# Patient Record
Sex: FEMALE | Race: WHITE | NOT HISPANIC OR LATINO | Employment: FULL TIME | URBAN - METROPOLITAN AREA
[De-identification: names, ages, dates, MRNs, and addresses within clinical notes are randomized per-mention and may not be internally consistent; named-entity substitution may affect disease eponyms.]

---

## 2023-04-07 DIAGNOSIS — F41.9 ANXIETY DISORDER, UNSPECIFIED: ICD-10-CM

## 2023-04-07 RX ORDER — PROPRANOLOL HYDROCHLORIDE 80 MG/1
CAPSULE, EXTENDED RELEASE ORAL
Qty: 90 CAPSULE | Refills: 0 | Status: SHIPPED | OUTPATIENT
Start: 2023-04-07 | End: 2023-08-18

## 2023-05-01 ENCOUNTER — LAB (OUTPATIENT)
Dept: LAB | Facility: LAB | Age: 58
End: 2023-05-01
Payer: COMMERCIAL

## 2023-05-01 ENCOUNTER — OFFICE VISIT (OUTPATIENT)
Dept: PRIMARY CARE | Facility: CLINIC | Age: 58
End: 2023-05-01
Payer: COMMERCIAL

## 2023-05-01 VITALS — DIASTOLIC BLOOD PRESSURE: 82 MMHG | SYSTOLIC BLOOD PRESSURE: 120 MMHG | BODY MASS INDEX: 52.73 KG/M2 | WEIGHT: 293 LBS

## 2023-05-01 DIAGNOSIS — Z00.00 ROUTINE GENERAL MEDICAL EXAMINATION AT A HEALTH CARE FACILITY: ICD-10-CM

## 2023-05-01 DIAGNOSIS — Z12.31 BREAST CANCER SCREENING BY MAMMOGRAM: Primary | ICD-10-CM

## 2023-05-01 LAB
ALANINE AMINOTRANSFERASE (SGPT) (U/L) IN SER/PLAS: 48 U/L (ref 7–45)
ALBUMIN (G/DL) IN SER/PLAS: 4.2 G/DL (ref 3.4–5)
ALKALINE PHOSPHATASE (U/L) IN SER/PLAS: 95 U/L (ref 33–136)
ANION GAP IN SER/PLAS: 13 MMOL/L (ref 10–20)
ASPARTATE AMINOTRANSFERASE (SGOT) (U/L) IN SER/PLAS: 30 U/L (ref 9–39)
BASOPHILS (10*3/UL) IN BLOOD BY AUTOMATED COUNT: 0.07 X10E9/L (ref 0–0.1)
BASOPHILS/100 LEUKOCYTES IN BLOOD BY AUTOMATED COUNT: 0.9 % (ref 0–2)
BILIRUBIN TOTAL (MG/DL) IN SER/PLAS: 0.4 MG/DL (ref 0–1.2)
CALCIUM (MG/DL) IN SER/PLAS: 9.7 MG/DL (ref 8.6–10.6)
CARBON DIOXIDE, TOTAL (MMOL/L) IN SER/PLAS: 29 MMOL/L (ref 21–32)
CHLORIDE (MMOL/L) IN SER/PLAS: 103 MMOL/L (ref 98–107)
CHOLESTEROL (MG/DL) IN SER/PLAS: 225 MG/DL (ref 0–199)
CHOLESTEROL IN HDL (MG/DL) IN SER/PLAS: 61.4 MG/DL
CHOLESTEROL/HDL RATIO: 3.7
CREATININE (MG/DL) IN SER/PLAS: 0.94 MG/DL (ref 0.5–1.05)
EOSINOPHILS (10*3/UL) IN BLOOD BY AUTOMATED COUNT: 0.3 X10E9/L (ref 0–0.7)
EOSINOPHILS/100 LEUKOCYTES IN BLOOD BY AUTOMATED COUNT: 3.7 % (ref 0–6)
ERYTHROCYTE DISTRIBUTION WIDTH (RATIO) BY AUTOMATED COUNT: 14.4 % (ref 11.5–14.5)
ERYTHROCYTE MEAN CORPUSCULAR HEMOGLOBIN CONCENTRATION (G/DL) BY AUTOMATED: 30.4 G/DL (ref 32–36)
ERYTHROCYTE MEAN CORPUSCULAR VOLUME (FL) BY AUTOMATED COUNT: 89 FL (ref 80–100)
ERYTHROCYTES (10*6/UL) IN BLOOD BY AUTOMATED COUNT: 4.68 X10E12/L (ref 4–5.2)
ESTIMATED AVERAGE GLUCOSE FOR HBA1C: 105 MG/DL
GFR FEMALE: 69 ML/MIN/1.73M2
GLUCOSE (MG/DL) IN SER/PLAS: 95 MG/DL (ref 74–99)
HEMATOCRIT (%) IN BLOOD BY AUTOMATED COUNT: 41.8 % (ref 36–46)
HEMOGLOBIN (G/DL) IN BLOOD: 12.7 G/DL (ref 12–16)
HEMOGLOBIN A1C/HEMOGLOBIN TOTAL IN BLOOD: 5.3 %
IMMATURE GRANULOCYTES/100 LEUKOCYTES IN BLOOD BY AUTOMATED COUNT: 0.4 % (ref 0–0.9)
LDL: 136 MG/DL (ref 0–99)
LEUKOCYTES (10*3/UL) IN BLOOD BY AUTOMATED COUNT: 8.1 X10E9/L (ref 4.4–11.3)
LYMPHOCYTES (10*3/UL) IN BLOOD BY AUTOMATED COUNT: 2.46 X10E9/L (ref 1.2–4.8)
LYMPHOCYTES/100 LEUKOCYTES IN BLOOD BY AUTOMATED COUNT: 30.2 % (ref 13–44)
MONOCYTES (10*3/UL) IN BLOOD BY AUTOMATED COUNT: 0.64 X10E9/L (ref 0.1–1)
MONOCYTES/100 LEUKOCYTES IN BLOOD BY AUTOMATED COUNT: 7.9 % (ref 2–10)
NEUTROPHILS (10*3/UL) IN BLOOD BY AUTOMATED COUNT: 4.64 X10E9/L (ref 1.2–7.7)
NEUTROPHILS/100 LEUKOCYTES IN BLOOD BY AUTOMATED COUNT: 56.9 % (ref 40–80)
NRBC (PER 100 WBCS) BY AUTOMATED COUNT: 0 /100 WBC (ref 0–0)
PLATELETS (10*3/UL) IN BLOOD AUTOMATED COUNT: 303 X10E9/L (ref 150–450)
POTASSIUM (MMOL/L) IN SER/PLAS: 4.7 MMOL/L (ref 3.5–5.3)
PROTEIN TOTAL: 6.8 G/DL (ref 6.4–8.2)
SODIUM (MMOL/L) IN SER/PLAS: 140 MMOL/L (ref 136–145)
THYROTROPIN (MIU/L) IN SER/PLAS BY DETECTION LIMIT <= 0.05 MIU/L: 1.91 MIU/L (ref 0.44–3.98)
TRIGLYCERIDE (MG/DL) IN SER/PLAS: 138 MG/DL (ref 0–149)
UREA NITROGEN (MG/DL) IN SER/PLAS: 21 MG/DL (ref 6–23)
VLDL: 28 MG/DL (ref 0–40)

## 2023-05-01 PROCEDURE — 84443 ASSAY THYROID STIM HORMONE: CPT

## 2023-05-01 PROCEDURE — 99214 OFFICE O/P EST MOD 30 MIN: CPT | Performed by: INTERNAL MEDICINE

## 2023-05-01 PROCEDURE — 36415 COLL VENOUS BLD VENIPUNCTURE: CPT

## 2023-05-01 PROCEDURE — 85025 COMPLETE CBC W/AUTO DIFF WBC: CPT

## 2023-05-01 PROCEDURE — 80053 COMPREHEN METABOLIC PANEL: CPT

## 2023-05-01 PROCEDURE — 83036 HEMOGLOBIN GLYCOSYLATED A1C: CPT

## 2023-05-01 PROCEDURE — 80061 LIPID PANEL: CPT

## 2023-05-01 NOTE — PROGRESS NOTES
Patient is a 58 year old female here today to discuss weight loss.     HPI: Patient is here to discuss weight loss. She has a history of RYGB in 2017 and says she only lost 50 lbs from the procedure. She has also trialed phentermine in the past with no success. She says that she typically eats a breakfast sandwich or yogurt for breakfast, depending on what time she eats breakfast she doesn't always eat lunch. For dinner she eats a protein, vegetable, and potato. She says that exercise has been hard. She has been swimming and water walking 1-2 times a week.     Obesity   - Trialed phentermine- no weight loss   - s/p RYGB 7/2017   - Weight today was 312 lbs.      Venous stasis ulcers/insufficiency   - following with vascular      HTN  - on propranolol, well controlled     ZEENAT  - on CPAP, compliant     FM, RLS, OA, sclerderma  Dry skin/hair loss  - follows with rheum  -Duloxetine 60mg  - on gabapentin 300mg TID  - underlying dry skin/hair loss likely 2/2 to underlying autoimmune disease     GERD  - Controlled with omeprazole         Interstitial cystitis  - follows with urogyn  - on hydroxyzine 25mg, phenazopyridine 200mg TID PRN     Hypercholesterolemia  - CVD risk 4.5%: no indication for statin therapy           HM  - due for mammogram  - PAP: s/p GABRIEL, cervix removed  - Colonoscopy: last 2020, needs in 2025  - COVID: 2 doses  - Tdap: 2021    Plan: Discussed potentially starting glp-1. Patient does not want to have any more surgery for her weight loss.

## 2023-05-01 NOTE — PATIENT INSTRUCTIONS
Sun,     The weight loss meds Ozempic (semaglutide) and Mounjaro (tirzapetide) are your best options for weight loss. [facility] is one in [place] that some of my patients use --- [email or web address]   . [employer] is another. Www.[employer] . Ozempic comes in 4 doses; Mounjaro comes in 6. You start at the lowest dose for 4 weeks then go up on the dose every 4 weeks. For some people, they don't notice the full appetite and craving suppression for a few months. Common side effects like worseninga brooks reflux, or other GI side effects can occur but you get used to them generally.   Go to downstairs to [address] for labs.   Call [phone] to schedule mammogram.   Consider the shingles shot. The new shot shingrix does reduce your chance by 90%; it's 2 shots 2-6 months apart then you're done. We can always do it here if you ever decide!     CL

## 2023-05-04 NOTE — PROGRESS NOTES
Sun Zimmerman is a 59 yo F presenting in [facility].     1. Obesity s.p RYGB 2017   -s/p phentermine 2022   -resistant to revision consideration, regrets initial surgery     2. Chronic venous insufficiency   -s/p vasc referral in the past     3. HTN   -propranolol monotherapy     4. ZEENAT   -CPAP     5. FM, RLS, OA, scleroderma   -olimpia 300 TID   -FU rheum   -h/o cymbalta, plaquenil, pramiprexole and meloxicam in the past     6. GERD   -omeprazole     7. Interstitial cystitis   -hdyroxyzine   -phenazopyridine       8. DLD low ASCVD     #HM   -tdap 2021   [ ]shingrix due   -cscope 2020 - due 2025   -no pap 2.2 ming   [ ]mammo due last 2.2020   [ ]screen lab nella e  -dexa 9.22 with rheum       Gen Aox3 NAD well appearing   HEENT mmm pharynx clear no cervical LAD   Eyes sclerae clear   CV rrr nl s1/2 no m/r/g  Pulm CTAB no adventitious sounds   Ext warm dry no edema 2+ DP pulse

## 2023-08-01 LAB
C REACTIVE PROTEIN (MG/L) IN SER/PLAS: 0.26 MG/DL
CALCIDIOL (25 OH VITAMIN D3) (NG/ML) IN SER/PLAS: 32 NG/ML
COBALAMIN (VITAMIN B12) (PG/ML) IN SER/PLAS: 303 PG/ML (ref 211–911)
CREATINE KINASE (U/L) IN SER/PLAS: 29 U/L (ref 0–215)
FOLATE (NG/ML) IN SER/PLAS: 10.4 NG/ML
SEDIMENTATION RATE, ERYTHROCYTE: 21 MM/H (ref 0–30)

## 2023-08-02 LAB
ANA PATTERN: ABNORMAL
ANA TITER: ABNORMAL
ANTI-CENTROMERE: <0.2 AI
ANTI-CHROMATIN: <0.2 AI
ANTI-DNA (DS): <1 IU/ML
ANTI-JO-1 IGG: <0.2 AI
ANTI-NUCLEAR ANTIBODY (ANA): POSITIVE
ANTI-RIBOSOMAL P: <0.2 AI
ANTI-RNP: <0.2 AI
ANTI-SCL-70: 0.9 AI
ANTI-SM/RNP: <0.2 AI
ANTI-SM: <0.2 AI
ANTI-SSA: <0.2 AI
ANTI-SSB: <0.2 AI

## 2023-08-05 LAB — VITAMIN B6: 36.5 NMOL/L (ref 20–125)

## 2023-08-18 DIAGNOSIS — F41.9 ANXIETY DISORDER, UNSPECIFIED: ICD-10-CM

## 2023-08-18 RX ORDER — PROPRANOLOL HYDROCHLORIDE 80 MG/1
CAPSULE, EXTENDED RELEASE ORAL
Qty: 90 CAPSULE | Refills: 0 | Status: SHIPPED | OUTPATIENT
Start: 2023-08-18 | End: 2023-11-06

## 2023-11-03 DIAGNOSIS — F41.9 ANXIETY DISORDER, UNSPECIFIED: ICD-10-CM

## 2023-11-06 RX ORDER — PROPRANOLOL HYDROCHLORIDE 80 MG/1
CAPSULE, EXTENDED RELEASE ORAL
Qty: 90 CAPSULE | Refills: 0 | Status: SHIPPED | OUTPATIENT
Start: 2023-11-06 | End: 2024-02-01

## 2023-12-16 DIAGNOSIS — M17.12 UNILATERAL PRIMARY OSTEOARTHRITIS, LEFT KNEE: ICD-10-CM

## 2023-12-18 RX ORDER — NAPROXEN 500 MG/1
500 TABLET ORAL
Qty: 60 TABLET | Refills: 2 | Status: SHIPPED | OUTPATIENT
Start: 2023-12-18 | End: 2024-03-25

## 2024-01-08 DIAGNOSIS — F32.9 MAJOR DEPRESSIVE DISORDER, SINGLE EPISODE, UNSPECIFIED: ICD-10-CM

## 2024-01-08 RX ORDER — DULOXETIN HYDROCHLORIDE 60 MG/1
60 CAPSULE, DELAYED RELEASE ORAL DAILY
Qty: 90 CAPSULE | Refills: 0 | Status: SHIPPED | OUTPATIENT
Start: 2024-01-08 | End: 2024-05-15 | Stop reason: SDUPTHER

## 2024-02-01 DIAGNOSIS — F41.9 ANXIETY DISORDER, UNSPECIFIED: ICD-10-CM

## 2024-02-01 RX ORDER — PROPRANOLOL HYDROCHLORIDE 80 MG/1
80 CAPSULE, EXTENDED RELEASE ORAL DAILY
Qty: 90 CAPSULE | Refills: 0 | Status: SHIPPED | OUTPATIENT
Start: 2024-02-01 | End: 2024-05-14 | Stop reason: SDUPTHER

## 2024-03-23 DIAGNOSIS — M17.12 UNILATERAL PRIMARY OSTEOARTHRITIS, LEFT KNEE: ICD-10-CM

## 2024-03-25 RX ORDER — NAPROXEN 500 MG/1
500 TABLET ORAL
Qty: 60 TABLET | Refills: 2 | Status: SHIPPED | OUTPATIENT
Start: 2024-03-25

## 2024-04-11 DIAGNOSIS — K21.9 GASTRO-ESOPHAGEAL REFLUX DISEASE WITHOUT ESOPHAGITIS: ICD-10-CM

## 2024-04-12 RX ORDER — OMEPRAZOLE 40 MG/1
CAPSULE, DELAYED RELEASE ORAL
Qty: 90 CAPSULE | Refills: 3 | Status: SHIPPED | OUTPATIENT
Start: 2024-04-12

## 2024-05-02 DIAGNOSIS — M25.362 PATELLAR INSTABILITY OF LEFT KNEE: ICD-10-CM

## 2024-05-02 DIAGNOSIS — M25.562 LEFT KNEE PAIN, UNSPECIFIED CHRONICITY: ICD-10-CM

## 2024-05-03 ENCOUNTER — OFFICE VISIT (OUTPATIENT)
Dept: PRIMARY CARE | Facility: CLINIC | Age: 59
End: 2024-05-03
Payer: COMMERCIAL

## 2024-05-03 VITALS — BODY MASS INDEX: 47.59 KG/M2 | WEIGHT: 286 LBS | SYSTOLIC BLOOD PRESSURE: 127 MMHG | DIASTOLIC BLOOD PRESSURE: 82 MMHG

## 2024-05-03 DIAGNOSIS — Z12.31 BREAST CANCER SCREENING BY MAMMOGRAM: ICD-10-CM

## 2024-05-03 DIAGNOSIS — M62.81 MUSCLE WEAKNESS OF RIGHT UPPER EXTREMITY: Primary | ICD-10-CM

## 2024-05-03 DIAGNOSIS — Z00.00 ROUTINE GENERAL MEDICAL EXAMINATION AT A HEALTH CARE FACILITY: ICD-10-CM

## 2024-05-03 DIAGNOSIS — M17.9 OSTEOARTHRITIS OF KNEE, UNSPECIFIED LATERALITY, UNSPECIFIED OSTEOARTHRITIS TYPE: ICD-10-CM

## 2024-05-03 PROCEDURE — 99396 PREV VISIT EST AGE 40-64: CPT | Performed by: INTERNAL MEDICINE

## 2024-05-14 DIAGNOSIS — F41.9 ANXIETY DISORDER, UNSPECIFIED: ICD-10-CM

## 2024-05-14 RX ORDER — PROPRANOLOL HYDROCHLORIDE 80 MG/1
80 CAPSULE, EXTENDED RELEASE ORAL DAILY
Qty: 90 CAPSULE | Refills: 0 | Status: SHIPPED | OUTPATIENT
Start: 2024-05-14

## 2024-05-15 ENCOUNTER — OFFICE VISIT (OUTPATIENT)
Dept: ORTHOPEDIC SURGERY | Facility: CLINIC | Age: 59
End: 2024-05-15
Payer: COMMERCIAL

## 2024-05-15 ENCOUNTER — HOSPITAL ENCOUNTER (OUTPATIENT)
Dept: RADIOLOGY | Facility: HOSPITAL | Age: 59
Discharge: HOME | End: 2024-05-15
Payer: COMMERCIAL

## 2024-05-15 VITALS — HEIGHT: 65 IN | WEIGHT: 286 LBS | BODY MASS INDEX: 47.65 KG/M2

## 2024-05-15 DIAGNOSIS — M17.12 ARTHRITIS OF LEFT KNEE: Primary | ICD-10-CM

## 2024-05-15 DIAGNOSIS — M25.562 LEFT KNEE PAIN, UNSPECIFIED CHRONICITY: ICD-10-CM

## 2024-05-15 DIAGNOSIS — F32.9 MAJOR DEPRESSIVE DISORDER, SINGLE EPISODE, UNSPECIFIED: ICD-10-CM

## 2024-05-15 DIAGNOSIS — M25.362 PATELLAR INSTABILITY OF LEFT KNEE: ICD-10-CM

## 2024-05-15 PROCEDURE — 20610 DRAIN/INJ JOINT/BURSA W/O US: CPT | Performed by: PHYSICIAN ASSISTANT

## 2024-05-15 PROCEDURE — 73564 X-RAY EXAM KNEE 4 OR MORE: CPT | Mod: LEFT SIDE | Performed by: RADIOLOGY

## 2024-05-15 PROCEDURE — 1036F TOBACCO NON-USER: CPT | Performed by: PHYSICIAN ASSISTANT

## 2024-05-15 PROCEDURE — 99213 OFFICE O/P EST LOW 20 MIN: CPT | Performed by: PHYSICIAN ASSISTANT

## 2024-05-15 PROCEDURE — 73564 X-RAY EXAM KNEE 4 OR MORE: CPT | Mod: LT

## 2024-05-15 RX ORDER — LIDOCAINE HYDROCHLORIDE 5 MG/ML
4 INJECTION, SOLUTION INFILTRATION; PERINEURAL
Status: COMPLETED | OUTPATIENT
Start: 2024-05-15 | End: 2024-05-15

## 2024-05-15 RX ORDER — TRIAMCINOLONE ACETONIDE 40 MG/ML
40 INJECTION, SUSPENSION INTRA-ARTICULAR; INTRAMUSCULAR
Status: COMPLETED | OUTPATIENT
Start: 2024-05-15 | End: 2024-05-15

## 2024-05-15 RX ADMIN — TRIAMCINOLONE ACETONIDE 40 MG: 40 INJECTION, SUSPENSION INTRA-ARTICULAR; INTRAMUSCULAR at 10:57

## 2024-05-15 RX ADMIN — LIDOCAINE HYDROCHLORIDE 4 ML: 5 INJECTION, SOLUTION INFILTRATION; PERINEURAL at 10:57

## 2024-05-15 ASSESSMENT — PAIN SCALES - GENERAL: PAINLEVEL_OUTOF10: 10 - WORST POSSIBLE PAIN

## 2024-05-15 ASSESSMENT — PAIN - FUNCTIONAL ASSESSMENT: PAIN_FUNCTIONAL_ASSESSMENT: 0-10

## 2024-05-15 NOTE — PROGRESS NOTES
History of Present Illness  59 y.o. female presenting for exacerbation of the left knee pain.  I previously seen the patient for her left knee just over a year ago, at that point time she received steroid injection which she states significantly improved her pain for a few months.  Patient states however recently over the past 6 weeks the left knee pain has significantly worsened limiting her mobility.  She denies any recent trauma or injury.  States that she has been taking anti-inflammatories with no significant improvement.    Past Medical History:   Diagnosis Date    Encounter for other preprocedural examination 05/25/2017    Pre-op testing    Encounter for preprocedural respiratory examination 05/24/2017    Preop pulmonary/respiratory exam    Other conditions influencing health status 10/14/2016    Difficulty Breathing (Dyspnea)       Medication Documentation Review Audit    **Prior to Admission medications have not yet been reviewed**         Not on File    Social History     Socioeconomic History    Marital status:      Spouse name: Not on file    Number of children: Not on file    Years of education: Not on file    Highest education level: Not on file   Occupational History    Not on file   Tobacco Use    Smoking status: Not on file    Smokeless tobacco: Not on file   Substance and Sexual Activity    Alcohol use: Not on file    Drug use: Not on file    Sexual activity: Not on file   Other Topics Concern    Not on file   Social History Narrative    Not on file     Social Determinants of Health     Financial Resource Strain: Not on file   Food Insecurity: Not on file   Transportation Needs: Not on file   Physical Activity: Not on file   Stress: Not on file   Social Connections: Not on file   Intimate Partner Violence: Not on file   Housing Stability: Not on file       Past Surgical History:   Procedure Laterality Date    HYSTERECTOMY  06/12/2013    Hysterectomy    KNEE ARTHROSCOPY W/ DEBRIDEMENT   05/09/2013    Arthroscopy Knee Right    MR HEAD ANGIO WO IV CONTRAST  12/10/2015    MR HEAD ANGIO WO IV CONTRAST 12/10/2015 [facility] CLINICAL LEGACY    OTHER SURGICAL HISTORY  01/05/2022    Transobturator sling placement    OTHER SURGICAL HISTORY  01/09/2020    Colonoscopy         Review of Systems    GENERAL: Negative  GI: Negative  MUSCULOSKELETAL: See HPI  SKIN: Negative  NEURO:  Negative     Physical Exam  Constitutional  General appearance:  Alert, oriented, and in no acute distress.  Well developed, well nourished.  Head and Face  Head and face:  Normocephalic and atraumatic.  Ears, Nose, Mouth, and Throat  External inspection of ears and nose: Normal.  Eyes:  Pupils are equal and round.  Neck  Neck:  no neck mass was observed.  Pulmonary  Respiratory effort:  no respiratory distress.  Cardiovascular  Intact distal pulses.  Lymphatic  Palpation of lymph nodes in the affected extremity:  Normal.  Skin  Skin and subcutaneous tissue:  Normal skin color and pigmentation.  Normal skin turgor.  No rashes.  Neurologic  Reflexes:  deep tendon reflexes were 2+ and symmetric.  Sensation:  normal to light touch.  Psychiatric  Judgement and insight:  Intact.  Mood and affect:  Normal.  Musculoskeletal  Left knee range of motion from 0 to 120 degrees limited due to soft tissue.  Tenderness palpation over the medial and lateral joint line.  Extensor mechanism intact.  Patient has a negative Lockman exam, negative anterior and negative posterior drawer. The knee is stable to varus and valgus stress without pain. Negative patellar apprehension. Patient is neurovascularly intact in the bilateral lower extremities.         Imaging     Xrays were ordered and obtained by myself, Wen DE LA GARZA. I personally reviewed the images today and the following is my personal findings: X-rays of the left knee show interval of worsening arthritis specifically in the patellofemoral compartment showing severe arthritis and moderate arthritis of  the medial compartment    Patient ID: Sun Zimmerman is a 59 y.o. female.    L Inj/Asp: L knee on 5/15/2024 10:57 AM  Indications: pain  Details: 22 G needle, anterolateral approach  Medications: 40 mg triamcinolone acetonide 40 mg/mL; 4 mL lidocaine 5 mg/mL (0.5 %)  Outcome: tolerated well, no immediate complications  Procedure, treatment alternatives, risks and benefits explained, specific risks discussed. Consent was given by the patient. Immediately prior to procedure a time out was called to verify the correct patient, procedure, equipment, support staff and site/side marked as required. Patient was prepped and draped in the usual sterile fashion.           Assessment  Left knee arthritis    Plan  I had a long discussion with the patient regarding her left knee pain.  We again discussed fact the patient has moderate to severe arthritis of the left knee.  We offer the patient a steroid injection into the left knee which she wanted proceed with and tolerated well.  We also discussed possibility of a gel injection in the future if she should not find adequate relief from this steroid injection.  The patient should call the office during business hours (9am-3pm; Monday - Friday)  with any questions or problems.  If the patient has any urgent issues outside of business hours, they should go to a local Emergency Room.

## 2024-05-16 RX ORDER — DULOXETIN HYDROCHLORIDE 60 MG/1
60 CAPSULE, DELAYED RELEASE ORAL DAILY
Qty: 90 CAPSULE | Refills: 1 | Status: SHIPPED | OUTPATIENT
Start: 2024-05-16

## 2024-06-05 ENCOUNTER — APPOINTMENT (OUTPATIENT)
Dept: RADIOLOGY | Facility: HOSPITAL | Age: 59
End: 2024-06-05
Payer: COMMERCIAL

## 2024-06-05 ENCOUNTER — APPOINTMENT (OUTPATIENT)
Dept: CARDIOLOGY | Facility: HOSPITAL | Age: 59
End: 2024-06-05
Payer: COMMERCIAL

## 2024-06-05 ENCOUNTER — HOSPITAL ENCOUNTER (OUTPATIENT)
Facility: HOSPITAL | Age: 59
Setting detail: SURGERY ADMIT
End: 2024-06-05
Attending: NEUROLOGICAL SURGERY | Admitting: NEUROLOGICAL SURGERY

## 2024-06-05 ENCOUNTER — APPOINTMENT (OUTPATIENT)
Dept: NEUROLOGY | Facility: HOSPITAL | Age: 59
End: 2024-06-05
Payer: COMMERCIAL

## 2024-06-05 ENCOUNTER — HOSPITAL ENCOUNTER (EMERGENCY)
Facility: HOSPITAL | Age: 59
Discharge: HOME | End: 2024-06-05
Attending: EMERGENCY MEDICINE
Payer: COMMERCIAL

## 2024-06-05 ENCOUNTER — HOSPITAL ENCOUNTER (EMERGENCY)
Facility: HOSPITAL | Age: 59
Discharge: OTHER INSTITUTION | End: 2024-06-05
Attending: STUDENT IN AN ORGANIZED HEALTH CARE EDUCATION/TRAINING PROGRAM
Payer: COMMERCIAL

## 2024-06-05 VITALS
DIASTOLIC BLOOD PRESSURE: 85 MMHG | OXYGEN SATURATION: 96 % | SYSTOLIC BLOOD PRESSURE: 129 MMHG | RESPIRATION RATE: 16 BRPM | TEMPERATURE: 97.8 F | HEART RATE: 70 BPM

## 2024-06-05 VITALS
HEIGHT: 65 IN | TEMPERATURE: 98.4 F | RESPIRATION RATE: 15 BRPM | BODY MASS INDEX: 48.45 KG/M2 | DIASTOLIC BLOOD PRESSURE: 101 MMHG | WEIGHT: 290.79 LBS | HEART RATE: 79 BPM | SYSTOLIC BLOOD PRESSURE: 150 MMHG | OXYGEN SATURATION: 97 %

## 2024-06-05 DIAGNOSIS — R56.9 SEIZURE (MULTI): Primary | ICD-10-CM

## 2024-06-05 DIAGNOSIS — G93.89 BRAIN MASS: Primary | ICD-10-CM

## 2024-06-05 LAB
ABO GROUP (TYPE) IN BLOOD: NORMAL
ALBUMIN SERPL-MCNC: 3.8 G/DL (ref 3.5–5)
ALP BLD-CCNC: 96 U/L (ref 35–125)
ALT SERPL-CCNC: 53 U/L (ref 5–40)
ANION GAP SERPL CALC-SCNC: 12 MMOL/L
ANTIBODY SCREEN: NORMAL
AST SERPL-CCNC: 39 U/L (ref 5–40)
ATRIAL RATE: 73 BPM
BASOPHILS # BLD AUTO: 0.05 X10*3/UL (ref 0–0.1)
BASOPHILS NFR BLD AUTO: 0.8 %
BILIRUB SERPL-MCNC: 0.4 MG/DL (ref 0.1–1.2)
BUN SERPL-MCNC: 20 MG/DL (ref 8–25)
CALCIUM SERPL-MCNC: 8.8 MG/DL (ref 8.5–10.4)
CHLORIDE SERPL-SCNC: 105 MMOL/L (ref 97–107)
CO2 SERPL-SCNC: 25 MMOL/L (ref 24–31)
CREAT SERPL-MCNC: 0.7 MG/DL (ref 0.4–1.6)
EGFRCR SERPLBLD CKD-EPI 2021: >90 ML/MIN/1.73M*2
EOSINOPHIL # BLD AUTO: 0.34 X10*3/UL (ref 0–0.7)
EOSINOPHIL NFR BLD AUTO: 5.7 %
ERYTHROCYTE [DISTWIDTH] IN BLOOD BY AUTOMATED COUNT: 13.7 % (ref 11.5–14.5)
GLUCOSE SERPL-MCNC: 104 MG/DL (ref 65–99)
HCT VFR BLD AUTO: 40.3 % (ref 36–46)
HGB BLD-MCNC: 12.7 G/DL (ref 12–16)
IMM GRANULOCYTES # BLD AUTO: 0.02 X10*3/UL (ref 0–0.7)
IMM GRANULOCYTES NFR BLD AUTO: 0.3 % (ref 0–0.9)
INR PPP: 0.9 (ref 0.9–1.1)
LACTATE BLDV-SCNC: 1.2 MMOL/L (ref 0.4–2)
LACTATE BLDV-SCNC: 2.5 MMOL/L (ref 0.4–2)
LYMPHOCYTES # BLD AUTO: 2.43 X10*3/UL (ref 1.2–4.8)
LYMPHOCYTES NFR BLD AUTO: 40.8 %
MCH RBC QN AUTO: 26.9 PG (ref 26–34)
MCHC RBC AUTO-ENTMCNC: 31.5 G/DL (ref 32–36)
MCV RBC AUTO: 85 FL (ref 80–100)
MONOCYTES # BLD AUTO: 0.44 X10*3/UL (ref 0.1–1)
MONOCYTES NFR BLD AUTO: 7.4 %
NEUTROPHILS # BLD AUTO: 2.67 X10*3/UL (ref 1.2–7.7)
NEUTROPHILS NFR BLD AUTO: 45 %
NRBC BLD-RTO: 0 /100 WBCS (ref 0–0)
P AXIS: 28 DEGREES
P OFFSET: 189 MS
P ONSET: 129 MS
PLATELET # BLD AUTO: 231 X10*3/UL (ref 150–450)
POTASSIUM SERPL-SCNC: 4.7 MMOL/L (ref 3.4–5.1)
PR INTERVAL: 184 MS
PROT SERPL-MCNC: 6.5 G/DL (ref 5.9–7.9)
PROTHROMBIN TIME: 10.6 SECONDS (ref 9.8–12.8)
Q ONSET: 221 MS
QRS COUNT: 12 BEATS
QRS DURATION: 102 MS
QT INTERVAL: 386 MS
QTC CALCULATION(BAZETT): 425 MS
QTC FREDERICIA: 412 MS
R AXIS: -7 DEGREES
RBC # BLD AUTO: 4.72 X10*6/UL (ref 4–5.2)
RH FACTOR (ANTIGEN D): NORMAL
SODIUM SERPL-SCNC: 142 MMOL/L (ref 133–145)
T AXIS: -6 DEGREES
T OFFSET: 414 MS
VENTRICULAR RATE: 73 BPM
WBC # BLD AUTO: 6 X10*3/UL (ref 4.4–11.3)

## 2024-06-05 PROCEDURE — 70450 CT HEAD/BRAIN W/O DYE: CPT | Performed by: RADIOLOGY

## 2024-06-05 PROCEDURE — 74177 CT ABD & PELVIS W/CONTRAST: CPT | Performed by: RADIOLOGY

## 2024-06-05 PROCEDURE — 71045 X-RAY EXAM CHEST 1 VIEW: CPT | Performed by: RADIOLOGY

## 2024-06-05 PROCEDURE — 86900 BLOOD TYPING SEROLOGIC ABO: CPT | Performed by: PHYSICIAN ASSISTANT

## 2024-06-05 PROCEDURE — 85025 COMPLETE CBC W/AUTO DIFF WBC: CPT | Performed by: EMERGENCY MEDICINE

## 2024-06-05 PROCEDURE — 36415 COLL VENOUS BLD VENIPUNCTURE: CPT | Performed by: EMERGENCY MEDICINE

## 2024-06-05 PROCEDURE — 95816 EEG AWAKE AND DROWSY: CPT

## 2024-06-05 PROCEDURE — 96374 THER/PROPH/DIAG INJ IV PUSH: CPT

## 2024-06-05 PROCEDURE — 2500000004 HC RX 250 GENERAL PHARMACY W/ HCPCS (ALT 636 FOR OP/ED): Performed by: EMERGENCY MEDICINE

## 2024-06-05 PROCEDURE — 80053 COMPREHEN METABOLIC PANEL: CPT | Performed by: STUDENT IN AN ORGANIZED HEALTH CARE EDUCATION/TRAINING PROGRAM

## 2024-06-05 PROCEDURE — 99221 1ST HOSP IP/OBS SF/LOW 40: CPT | Performed by: NEUROLOGICAL SURGERY

## 2024-06-05 PROCEDURE — 83605 ASSAY OF LACTIC ACID: CPT | Performed by: EMERGENCY MEDICINE

## 2024-06-05 PROCEDURE — 36415 COLL VENOUS BLD VENIPUNCTURE: CPT | Performed by: STUDENT IN AN ORGANIZED HEALTH CARE EDUCATION/TRAINING PROGRAM

## 2024-06-05 PROCEDURE — 70450 CT HEAD/BRAIN W/O DYE: CPT

## 2024-06-05 PROCEDURE — 2500000004 HC RX 250 GENERAL PHARMACY W/ HCPCS (ALT 636 FOR OP/ED): Performed by: STUDENT IN AN ORGANIZED HEALTH CARE EDUCATION/TRAINING PROGRAM

## 2024-06-05 PROCEDURE — 99285 EMERGENCY DEPT VISIT HI MDM: CPT | Performed by: PHYSICIAN ASSISTANT

## 2024-06-05 PROCEDURE — 96361 HYDRATE IV INFUSION ADD-ON: CPT

## 2024-06-05 PROCEDURE — 70553 MRI BRAIN STEM W/O & W/DYE: CPT

## 2024-06-05 PROCEDURE — 99285 EMERGENCY DEPT VISIT HI MDM: CPT | Mod: 25

## 2024-06-05 PROCEDURE — 71260 CT THORAX DX C+: CPT

## 2024-06-05 PROCEDURE — 70553 MRI BRAIN STEM W/O & W/DYE: CPT | Performed by: RADIOLOGY

## 2024-06-05 PROCEDURE — 83605 ASSAY OF LACTIC ACID: CPT | Performed by: STUDENT IN AN ORGANIZED HEALTH CARE EDUCATION/TRAINING PROGRAM

## 2024-06-05 PROCEDURE — 80053 COMPREHEN METABOLIC PANEL: CPT | Performed by: EMERGENCY MEDICINE

## 2024-06-05 PROCEDURE — 93005 ELECTROCARDIOGRAM TRACING: CPT

## 2024-06-05 PROCEDURE — 85025 COMPLETE CBC W/AUTO DIFF WBC: CPT | Performed by: STUDENT IN AN ORGANIZED HEALTH CARE EDUCATION/TRAINING PROGRAM

## 2024-06-05 PROCEDURE — 2500000004 HC RX 250 GENERAL PHARMACY W/ HCPCS (ALT 636 FOR OP/ED): Performed by: PHYSICIAN ASSISTANT

## 2024-06-05 PROCEDURE — 71260 CT THORAX DX C+: CPT | Performed by: RADIOLOGY

## 2024-06-05 PROCEDURE — 2550000001 HC RX 255 CONTRASTS: Performed by: EMERGENCY MEDICINE

## 2024-06-05 PROCEDURE — 96374 THER/PROPH/DIAG INJ IV PUSH: CPT | Mod: 59

## 2024-06-05 PROCEDURE — 2500000001 HC RX 250 WO HCPCS SELF ADMINISTERED DRUGS (ALT 637 FOR MEDICARE OP): Performed by: EMERGENCY MEDICINE

## 2024-06-05 PROCEDURE — 85610 PROTHROMBIN TIME: CPT | Performed by: PHYSICIAN ASSISTANT

## 2024-06-05 PROCEDURE — A9575 INJ GADOTERATE MEGLUMI 0.1ML: HCPCS | Performed by: EMERGENCY MEDICINE

## 2024-06-05 PROCEDURE — 71045 X-RAY EXAM CHEST 1 VIEW: CPT

## 2024-06-05 RX ORDER — SODIUM CHLORIDE, SODIUM LACTATE, POTASSIUM CHLORIDE, CALCIUM CHLORIDE 600; 310; 30; 20 MG/100ML; MG/100ML; MG/100ML; MG/100ML
125 INJECTION, SOLUTION INTRAVENOUS CONTINUOUS
Status: DISCONTINUED | OUTPATIENT
Start: 2024-06-05 | End: 2024-06-05 | Stop reason: HOSPADM

## 2024-06-05 RX ORDER — LEVETIRACETAM 500 MG/1
750 TABLET ORAL 2 TIMES DAILY
Status: DISCONTINUED | OUTPATIENT
Start: 2024-06-05 | End: 2024-06-05

## 2024-06-05 RX ORDER — LEVETIRACETAM 1000 MG/1
1000 TABLET ORAL 2 TIMES DAILY
Qty: 60 TABLET | Refills: 11 | Status: SHIPPED | OUTPATIENT
Start: 2024-06-05 | End: 2025-06-05

## 2024-06-05 RX ORDER — LEVETIRACETAM 15 MG/ML
1500 INJECTION INTRAVASCULAR ONCE
Status: COMPLETED | OUTPATIENT
Start: 2024-06-05 | End: 2024-06-05

## 2024-06-05 RX ORDER — GADOTERATE MEGLUMINE 376.9 MG/ML
26 INJECTION INTRAVENOUS
Status: COMPLETED | OUTPATIENT
Start: 2024-06-05 | End: 2024-06-05

## 2024-06-05 RX ORDER — HYDROXYZINE HYDROCHLORIDE 25 MG/1
25 TABLET, FILM COATED ORAL NIGHTLY
COMMUNITY

## 2024-06-05 RX ORDER — GABAPENTIN 600 MG/1
600 TABLET ORAL 3 TIMES DAILY
COMMUNITY

## 2024-06-05 RX ORDER — LEVETIRACETAM 500 MG/1
750 TABLET ORAL 2 TIMES DAILY
Status: DISCONTINUED | OUTPATIENT
Start: 2024-06-05 | End: 2024-06-05 | Stop reason: HOSPADM

## 2024-06-05 RX ADMIN — SODIUM CHLORIDE, POTASSIUM CHLORIDE, SODIUM LACTATE AND CALCIUM CHLORIDE 125 ML/HR: 600; 310; 30; 20 INJECTION, SOLUTION INTRAVENOUS at 12:59

## 2024-06-05 RX ADMIN — LEVETIRACETAM 1500 MG: 15 INJECTION INTRAVENOUS at 06:50

## 2024-06-05 RX ADMIN — LEVETIRACETAM 1500 MG: 15 INJECTION INTRAVASCULAR at 12:42

## 2024-06-05 RX ADMIN — GADOTERATE MEGLUMINE 26 ML: 376.9 INJECTION INTRAVENOUS at 13:50

## 2024-06-05 RX ADMIN — IOHEXOL 80 ML: 350 INJECTION, SOLUTION INTRAVENOUS at 15:34

## 2024-06-05 RX ADMIN — LEVETIRACETAM 750 MG: 500 TABLET, FILM COATED ORAL at 18:48

## 2024-06-05 ASSESSMENT — COLUMBIA-SUICIDE SEVERITY RATING SCALE - C-SSRS
1. IN THE PAST MONTH, HAVE YOU WISHED YOU WERE DEAD OR WISHED YOU COULD GO TO SLEEP AND NOT WAKE UP?: NO
6. HAVE YOU EVER DONE ANYTHING, STARTED TO DO ANYTHING, OR PREPARED TO DO ANYTHING TO END YOUR LIFE?: NO
6. HAVE YOU EVER DONE ANYTHING, STARTED TO DO ANYTHING, OR PREPARED TO DO ANYTHING TO END YOUR LIFE?: NO
1. IN THE PAST MONTH, HAVE YOU WISHED YOU WERE DEAD OR WISHED YOU COULD GO TO SLEEP AND NOT WAKE UP?: NO
2. HAVE YOU ACTUALLY HAD ANY THOUGHTS OF KILLING YOURSELF?: NO
2. HAVE YOU ACTUALLY HAD ANY THOUGHTS OF KILLING YOURSELF?: NO

## 2024-06-05 ASSESSMENT — PAIN - FUNCTIONAL ASSESSMENT: PAIN_FUNCTIONAL_ASSESSMENT: 0-10

## 2024-06-05 ASSESSMENT — PAIN SCALES - GENERAL
PAINLEVEL_OUTOF10: 0 - NO PAIN
PAINLEVEL_OUTOF10: 0 - NO PAIN

## 2024-06-05 NOTE — CONSULTS
"Inpatient consult to Neurology  Consult performed by: Rob Ly  Consult ordered by: Jus White PA-C          History Of Present Illness  Sun Zimmerman is a 59 y.o. female with PMH of HTN, chronic venous insufficiency, ZEENAT, fibromyalgia, RLS, OA, scleroderma, GERD, obesity s/p RYGB (2017), and interstitial cystitis presenting with new onset generalized seizures.    The patient was transferred today from [facility] for neurosurgery consult d/t possible underlying L brain mass. She was admitted to [facility] ED today due to an seizure reported by her . She was given 1.5 g of Keppra, and head CT revealed a large area of edema in the left frontal lobe with possible underlying mass. The patient also reported having a seizure overnight on Sunday, in which she bit her tongue. She thinks that she may have had an overnight seizure about a month ago as well.    The patient also has a notable rheumatologic history, including systemic sclerosis with positive anti-SCL 70 and ANSON, fibromyalgia, and OA of the left knee.    Epilepsy Risk Factors: edema in L frontal lobe with possible underlying mass on head CT  Previous EEG results: n/a    History per patient  Approximately 1 month ago she woke up in the morning and noticed right lateral tongue bite and blood on her pillow.  During the night her  did not notice anything abnormal neither did she.  Sunday night she had another episode where she woke up out of sleep and was thrashing her upper extremities violently.    Last night she woke herself up out of sleep with upper extremity punching and thrashing movements.  The episode lasted about 2 minutes and she was awake during the episode but feels like she was in a fog, \"felt like she was in a dark tunnel\". She can remember punching out and remembers her  calling out her name. She did not lose control of her bowel or bladder, after the episode she was awake and could answer questions but was not " completely back to baseline.  All episodes have been out of sleep.  Episode last night was out of a dream.     Over the last 3 months she has noticed decreased dexterity in her right hand and mild weakness in RUE.  She denies headache, double vision, ataxia, numbness, tingling, weight loss, fever.  She has long history of sleepwalking, vivid dreams and dream enactment.    History per   Last night he was woken up when he heard patient scream and then witnessed her violently thrashing her bilateral arms asynchronously.  This lasted about 2 minutes.  Her eyes were open.  Her head was not turned towards one side.       Social history  Lives at home with , currently works as [employer]   Social alcohol use, no tobacco or recreational drugs    Family history positive only for mother with GBM        Current Facility-Administered Medications on File Prior to Encounter   Medication    [COMPLETED] levETIRAcetam (Keppra) 1,500 mg in NaCl (iso-os) 100 mL     Current Outpatient Medications on File Prior to Encounter   Medication Sig    DULoxetine (Cymbalta) 60 mg DR capsule Take 1 capsule (60 mg) by mouth once daily.    gabapentin (Neurontin) 600 mg tablet Take 1 tablet (600 mg) by mouth 3 times a day.    hydrOXYzine HCL (Atarax) 25 mg tablet Take 1 tablet (25 mg) by mouth once daily at bedtime.    naproxen (Naprosyn) 500 mg tablet TAKE 1 TABLET BY MOUTH EVERY 12 HOURS WITH FOOD AS NEEDED    omeprazole (PriLOSEC) 40 mg DR capsule TAKE 1 CAPSULE BY MOUTH EVERY DAY----MUST MAKE APPT    propranolol LA (Inderal LA) 80 mg 24 hr capsule Take 1 capsule (80 mg) by mouth once daily.          Past Medical History  Past Medical History:   Diagnosis Date    Encounter for other preprocedural examination 05/25/2017    Pre-op testing    Encounter for preprocedural respiratory examination 05/24/2017    Preop pulmonary/respiratory exam    Hypertension     Other conditions influencing health status 10/14/2016     Difficulty Breathing (Dyspnea)     Surgical History  Past Surgical History:   Procedure Laterality Date    HYSTERECTOMY  06/12/2013    Hysterectomy    KNEE ARTHROSCOPY W/ DEBRIDEMENT  05/09/2013    Arthroscopy Knee Right    MR HEAD ANGIO WO IV CONTRAST  12/10/2015    MR HEAD ANGIO WO IV CONTRAST 12/10/2015 [facility] CLINICAL LEGACY    OTHER SURGICAL HISTORY  01/05/2022    Transobturator sling placement    OTHER SURGICAL HISTORY  01/09/2020    Colonoscopy     Social History  Social History     Tobacco Use    Smoking status: Never    Smokeless tobacco: Never     Allergies  Patient has no known allergies.  (Not in a hospital admission)      Review of Systems  Neurological Exam  MENTAL STATUS: AAO x3  Naming, repetition, fluency intact    CRANIAL NERVES:  CN2: Visual fields intact  CN3,4,6: Pupils round and equally reactive to light, 4mm diameter. Full ROM with no nystagmus.   CN5: Facial sensation to light touch intact  CN7: Facial expression intact  CN8: Finger rub revealed L hearing loss. R hearing intact. Hearing intact to interview  CN9,10: Palate elevates symmetrically  CN11: Shoulder shrug and neck turn intact  CN12: tongue midline and normal bulk, remote right lateral tongue bite    Very mild right upper extremity drift  Right finger tapping slow compared to left    MOTOR:  Biceps: R5 L5  Triceps: R5 L5  Delt: R5 L5  : R5 L5    Hip flexion: R5 L5  Knee flexion: R5 L5  Plantar flexion: R5 L5  Dorsiflexion: R5 L5    REFLEXES;  Biceps: R2 L2  Triceps: R2 L2  Brachioradialis: R2 L2  Patellar: R2 L2  Achilles: R2 L2    SENSORY:  Sensation intact to light touch, temperature and vibration in upper and lower extremities    COORDINATION:  Heel-to-shin: slow, no ataxia  Finger-to-nose: R slower than L    GAIT:  Deferred       Physical Exam  Last Recorded Vitals  Blood pressure 131/84, pulse 81, temperature 36.7 °C (98.1 °F), temperature source Oral, resp. rate 16, SpO2 98%.    Relevant Results  Scheduled  medications  levETIRAcetam, 750 mg, oral, BID      Continuous medications  lactated Ringer's, 125 mL/hr, Last Rate: 125 mL/hr (06/05/24 1259)      PRN medications     Results for orders placed or performed during the hospital encounter of 06/05/24 (from the past 24 hour(s))   Protime-INR   Result Value Ref Range    Protime 10.6 9.8 - 12.8 seconds    INR 0.9 0.9 - 1.1                          [place] Coma Scale  Best Eye Response: Spontaneous  Best Verbal Response: Oriented  Best Motor Response: Follows commands  [place] Coma Scale Score: 15                 I have personally reviewed the following imaging results ECG 12 lead    Result Date: 6/5/2024  Normal sinus rhythm Normal ECG When compared with ECG of 25-MAY-2017 13:14, No significant change was found Confirmed by Jose Dangelo ([id number]) on 6/5/2024 11:05:35 AM    CT head wo IV contrast    Result Date: 6/5/2024  Interpreted By:  Lisa Caruso, STUDY: CT HEAD WO IV CONTRAST;  6/5/2024 7:00 am   INDICATION: Signs/Symptoms:seizure.   COMPARISON: MRI brain 10 December 2015   ACCESSION NUMBER(S): [id number]   ORDERING CLINICIAN: LISA MORENO   TECHNIQUE: CT of the brain from the skull vertex to the skull base, without intravenous contrast   FINDINGS: ACUTE INTRA-AXIAL HEMORRHAGE:  Negative   ACUTE EXTRA-AXIAL/SUBDURAL HEMORRHAGE:  Negative   ACUTE INTRACRANIAL MASS EFFECT:  No midline shift or basilar herniation, but there is probably an element of gyral swelling and sulcal effacement in the left frontal lobe around the area of vasogenic edema described below   CT EVIDENCE OF ACUTE / SUBACUTE TERRITORIAL ISCHEMIA:  Negative   VENTRICLES:  Normal caliber and configuration   OTHER BRAIN FINDINGS:  Large area of vasogenic cerebral edema in subcortical left frontal lobe. No such finding on MRI brain 10 December 2015   INCLUDED PARANASAL SINUSES: All clear   INCLUDED MASTOID AIR CELLS: All clear   SKULL:  No lytic or blastic lesion   EXTRACRANIAL SOFT TISSUES:  Scalp and  occular globes grossly normal by CT       MRI brain without and with intravenous contrast recommended to further evaluate a large area of vasogenic edema in the left frontal lobe, was not present on the only pertinent comparison exam, MRI brain 10 December 2015. An underlying mass may be present   No other acute intracranial process   No acute intracranial hemorrhage   No compelling CT evidence for large acute territorial infarct   No subdural collection   MACRO: None   Signed by: Lisa Caruso 6/5/2024 7:14 AM Dictation workstation:   [id number]    XR chest 1 view    Result Date: 6/5/2024  Interpreted By:  Lisa Caruso, STUDY: XR CHEST 1 VIEW;  6/5/2024 6:59 am   INDICATION: Signs/Symptoms:seizure.   COMPARISON: Portable chest 23 January 2019   ACCESSION NUMBER(S): [id number]   ORDERING CLINICIAN: LISA MORENO   TECHNIQUE: Single frontal view of the chest; Portable technique   FINDINGS:   LINES AND TUBES: n/a   HEART AND MEDIASTINUM: Heart size: Within normal limits Thoracic aorta: Within expected limits Sonia and nonvascular: within normal limits   PULMONARY VESSELS: Within normal limits; no sign of edema   LUNGS AND AIRWAYS: Clear; no acute airspace disease   PLEURA: Effusion: No substantial pleural effusion on either side Pneumothorax: No substantial pneumothorax on either side Other: n/a   BONES: No acute findings       NO ACUTE DISEASE IN THE CHEST   MACRO: None   Signed by: Lisa Caruso 6/5/2024 7:10 AM Dictation workstation:   [id number]    XR knee left 4+ views    Result Date: 5/17/2024  Interpreted By:  Marycarmen Laurent, STUDY: Left knee, four views.   INDICATION: Signs/Symptoms:left knee pain.   COMPARISON: 03/01/2023.   ACCESSION NUMBER(S): [id number]   ORDERING CLINICIAN: PROSPER GROSS   FINDINGS: No acute fracture or malalignment. Severe medial and patellofemoral compartment osteoarthrosis with joint space loss and osteophytes. Mild lateral compartment osteoarthrosis with osteophytes. No significant  knee joint effusion. Soft tissues are unremarkable.       1. Severe medial and patellofemoral compartment osteoarthrosis.   MACRO: None.   Signed by: Marycarmen Laurent 5/17/2024 8:01 PM Dictation workstation:   [id number]  .      Assessment/Plan   Active Problems:  There are no active Hospital Problems.    Sun Zimmerman is a 59 y.o. right-handed female with PMH of HTN, chronic venous insufficiency, ZEENAT, fibromyalgia, RLS, OA, scleroderma, GERD, obesity s/p RYGB (2017), and interstitial cystitis presenting with new onset generalized seizures.  Over the last month she has had 3 episodes of nocturnal generalized convulsions with tongue bite concerning for seizures.  Yesterday evening she had episode of asynchronous bilateral convulsive activity out of sleep with associated tongue bite and ictal cry.  Over the last 3 months she has noticed loss of dexterity in her right hand.  She initially presented to [facility] where CT head showed large area of left frontal vasogenic edema and she was transferred to .  MRI brain with and without contrast shows left frontal homogenously enhancing dural based mass with associated mass effect and vasogenic edema.  Nocturnal episodes most concerning for seizures in the setting of left frontal mass with vasogenic edema.      4D Classification of the Paroxysmal Episodes:  Epileptic  Episodes semiology: Hypermotor seizure  Frequency: 3 times in the last month  History of Status Epilepticus: No  Localization: Left frontal  Etiology: Unknown  Co-morbidities: ZEENAT, fibromyalgia, RLS, OA, scleroderma, left frontal dural based brain mass      Recommendations  -Give additional 1500 mg IV Keppra to complete 3 g load  -Start maintenance Keppra 1 g twice daily  -Routine EEG, please asked the techs to leave the EEG leads on in case she needs longer monitoring, we will follow-up results of routine EEG and determine if longer monitoring is needed  -If there is evidence any evidence of ongoing  seizure activity, frequent spikes or PLEDs on routine EEG recommend obtaining continuous video EEG  -Appreciate neurosurgery recommendations regarding left frontal dural based mass with mass effect and vasogenic edema  -Seizure precautions on discharge  -We will arrange for outpatient neurology follow-up  -Ativan 2 mg IV for prolonged motor seizure lasting more than 3 minutes or a cluster of 3 or more motor seizures in an 8 hour period.  -Patient discussed with attending Dr. Mix    -If patient is discharged from ED please provide her with 60-day prescription of Keppra 1 g twice daily, strict return and seizure precautions    SANTIAGO JACKSON    Note was edited, reviewed and written with senior resident below    Scot Benedict MD PhD

## 2024-06-05 NOTE — CONSULTS
Reason For Consult  Left frontal hypodensity concerning for mass    History Of Present Illness  Sun Zimmerman is a 59 y.o. female presenting with seizure like activity.    Per patient she had an event overnight a few days ago where she bit her tongue and felt like she had a seizure in her sleep.  She again had concern for seizures today so came into the hospital.     Denies any current neurologic symptoms aside from numbness of RUE and decreased dexterity in right hand.    Denies any headache/ blurry vision, nausea or vomiting.      Past Medical History  She has a past medical history of Encounter for other preprocedural examination (05/25/2017), Encounter for preprocedural respiratory examination (05/24/2017), Hypertension, and Other conditions influencing health status (10/14/2016).    Surgical History  She has a past surgical history that includes Knee arthroscopy w/ debridement (05/09/2013); Other surgical history (01/05/2022); Other surgical history (01/09/2020); Hysterectomy (06/12/2013); and MR angio head wo IV contrast (12/10/2015).     Social History  She reports that she has never smoked. She has never used smokeless tobacco. No history on file for alcohol use and drug use.    Family History  No family history on file.     Allergies  Patient has no known allergies.    Review of Systems  10 point ROS is obtained and negative except the ones mentioned in the HPI      Physical Exam  General: awake, alert, interactive  HEENT: atraumatic, mucus membranes most  Neuro:   NAD, A&Ox3  Cranial Nerves II-XII: PERRL, EOMI, Face symmetric, Facial SILT, Palate/Tongue midline and symmetric, shoulder shrugs symmetric, hearing intact to finger rubs bilaterally  Motor: RUE 4/5, otherwise 5/5 throughout  Sensation: SILT throughout all extremities  DTRS: 2+ Throughout, No Hoffmans or Clonus   Cardiac: carotid pulses 2+ bilaterally   Respiratory: breathing comfortably on RA  Abdomen: non tender, non distended   Skin:  warm, dry     Last Recorded Vitals  Blood pressure 131/84, pulse 81, temperature 36.7 °C (98.1 °F), temperature source Oral, resp. rate 16, SpO2 98%.    Relevant Results  Imaging personally reviewed and is significant for: CTH without contrast demonstrating a left frontal hypodensity concerning for mass.  There is no brain compression or midline shift     Assessment/Plan     Sun Zimmerman is a 59 yr old F with h/o HLD, depression/anxiety, GERD, fibromyalgia, obesity, p/w concern for sz activity, CTH LF hypodensity    Recommendations:  -MRI brain with and without contrast to better define lesion   -CT CAP with contrast for metastatic workup   -consider neurology consult and keppra given seizure concern     Final recommendations pending imaging       Shruti Beltran MD

## 2024-06-05 NOTE — ED TRIAGE NOTES
Patient arrives to [facility] ED as a transfer from [facility] d/t possible brain mass. Patient was seen at [facility] ED d/t unexplained seizures.

## 2024-06-05 NOTE — ED PROVIDER NOTES
"HPI   Chief Complaint   Patient presents with    Seizures     Patient BIB EMS from for seizure she had in her sleep. She states this was the 2nd one she had this week, the first being on Sunday night while she was sleeping. She denies a history of seizures and was supposed to call in the morning to schedule a CT & MRI that her PCP wanted her to have done. Patient states she feels very weak and \"out of it.\"       Patient presents for possible seizure.  She reportedly had an episode in which she was fully aware but was not able to control her limbs.  She was swinging her arms for about a minute.  After the arm movements stopped, she is not able to speak or move normally and has been very weak.  She reportedly had a similar episode earlier in the week in which she bit her tongue.  On awakening, she found a large amount of blood had run out of her mouth.  Her  question whether she may have had 1 further episode earlier in the month.  Nothing like this is happened previously.                          [place] Coma Scale Score: 15         NIH Stroke Scale: 0             Patient History   Past Medical History:   Diagnosis Date    Encounter for other preprocedural examination 05/25/2017    Pre-op testing    Encounter for preprocedural respiratory examination 05/24/2017    Preop pulmonary/respiratory exam    Hypertension     Other conditions influencing health status 10/14/2016    Difficulty Breathing (Dyspnea)     Past Surgical History:   Procedure Laterality Date    HYSTERECTOMY  06/12/2013    Hysterectomy    KNEE ARTHROSCOPY W/ DEBRIDEMENT  05/09/2013    Arthroscopy Knee Right    MR HEAD ANGIO WO IV CONTRAST  12/10/2015    MR HEAD ANGIO WO IV CONTRAST 12/10/2015 [facility] CLINICAL LEGACY    OTHER SURGICAL HISTORY  01/05/2022    Transobturator sling placement    OTHER SURGICAL HISTORY  01/09/2020    Colonoscopy     No family history on file.  Social History     Tobacco Use    Smoking status: Never    Smokeless tobacco: " Never   Substance Use Topics    Alcohol use: Not on file    Drug use: Not on file       Physical Exam   ED Triage Vitals [06/05/24 0514]   Temperature Heart Rate Respirations BP   36.9 °C (98.5 °F) 79 15 (!) 150/101      Pulse Ox Temp Source Heart Rate Source Patient Position   97 % Temporal Monitor Lying      BP Location FiO2 (%)     Right arm --       Physical Exam  HENT:      Head: Normocephalic.   Eyes:      General: No scleral icterus.     Extraocular Movements: Extraocular movements intact.      Pupils: Pupils are equal, round, and reactive to light.   Cardiovascular:      Rate and Rhythm: Normal rate.   Pulmonary:      Effort: Pulmonary effort is normal.   Skin:     General: Skin is warm.   Neurological:      Mental Status: She is alert.      Comments: Patient awake and alert, able to answer questions appropriately.  Sensation intact bilaterally.  Mildly decreased strength in bilateral upper and lower extremities.         ED Course & MDM   ED Course as of 06/05/24 0623 Wed Jun 05, 2024 0607 EKG interpretation: Normal sinus rhythm, rate 73.  Normal axis.  No ST or T wave abnormalities. [ML]      ED Course User Index  [ML] Guero Anderson MD         Diagnoses as of 06/05/24 0623   Seizure (Multi)       Medical Decision Making  Patient presents after apparent seizure.  CT of her head reveals area suspicious for mass with surrounding vasogenic edema.  Patient was given Keppra load.  Patient's case was discussed with neurosurgery who recommends transfer to [facility] for further evaluation.  No further seizures noted in the emergency department.  Patient continues to be at neurological status baseline.  Patient was then transferred.  Parts of this chart were completed with dictation software, please excuse any errors in transcription.        Procedure  Procedures     Guero Anderson MD  06/05/24 1698

## 2024-06-05 NOTE — DISCHARGE INSTRUCTIONS
Please return to close ED if you develop any worsening symptoms including dizziness, instability, inability to walk, episodes of passing out, chest pain, shortness of breath, weakness or repeated seizure.

## 2024-06-05 NOTE — ED PROVIDER NOTES
HPI   Chief Complaint   Patient presents with    Seizures       59-year-old female with history of HTN, ZEENAT, fibromyalgia, OA, GERD, obesity SP RYGB  presenting as transfer from [facility] for neurosurgery consultation with imaging concerning for possible underlying left brain mass.  States that while at sleep overnight in bed her  witnessed full tonic-clonic seizure activity.  No tongue biting or other injury from the activity.  Was transferred by EMS to [facility].  Reportedly on arrival to the hospital she was still postictal.  She received 1.5 g of Keppra, head CT head performed which showed this edema and transferred to [facility] for neurosurgery.  States that she had a seizure overnight on  that point she bit her tongue, had no clear reason why she did not present to the ED.  States that she thinks she might of had a overnight seizure about a month ago as well.  She states that her mother  of a brain tumor approximately 13 years ago.  Denies tobacco or drug use, occasional alcohol use                          [place] Coma Scale Score: 15         NIH Stroke Scale: 0             Patient History   Past Medical History:   Diagnosis Date    Encounter for other preprocedural examination 2017    Pre-op testing    Encounter for preprocedural respiratory examination 2017    Preop pulmonary/respiratory exam    Hypertension     Other conditions influencing health status 10/14/2016    Difficulty Breathing (Dyspnea)     Past Surgical History:   Procedure Laterality Date    HYSTERECTOMY  2013    Hysterectomy    KNEE ARTHROSCOPY W/ DEBRIDEMENT  2013    Arthroscopy Knee Right    MR HEAD ANGIO WO IV CONTRAST  12/10/2015    MR HEAD ANGIO WO IV CONTRAST 12/10/2015 [facility] CLINICAL LEGACY    OTHER SURGICAL HISTORY  2022    Transobturator sling placement    OTHER SURGICAL HISTORY  2020    Colonoscopy     No family history on file.  Social History     Tobacco Use     Smoking status: Never    Smokeless tobacco: Never   Substance Use Topics    Alcohol use: Not on file    Drug use: Not on file       Physical Exam   ED Triage Vitals   Temp Pulse Resp BP   -- -- -- --      SpO2 Temp src Heart Rate Source Patient Position   -- -- -- --      BP Location FiO2 (%)     -- --       Physical Exam  Vitals and nursing note reviewed.   Constitutional:       Appearance: Normal appearance.   HENT:      Head: Atraumatic.      Mouth/Throat:      Mouth: Mucous membranes are moist.   Eyes:      Extraocular Movements: Extraocular movements intact.      Pupils: Pupils are equal, round, and reactive to light.   Cardiovascular:      Rate and Rhythm: Normal rate and regular rhythm.   Pulmonary:      Breath sounds: Normal breath sounds.   Abdominal:      General: Bowel sounds are normal.      Palpations: Abdomen is soft.      Tenderness: There is no abdominal tenderness.   Musculoskeletal:         General: No swelling or deformity. Normal range of motion.      Cervical back: Neck supple.   Skin:     General: Skin is warm and dry.   Neurological:      General: No focal deficit present.      Mental Status: She is alert and oriented to person, place, and time.      Cranial Nerves: No cranial nerve deficit.      Sensory: No sensory deficit.      Motor: No weakness.   Psychiatric:         Mood and Affect: Mood normal.         Behavior: Behavior normal.         ED Course & MDM   ED Course as of 06/05/24 1529   Wed Jun 05, 2024   1235 I spoke with neurology epilepsy who recommended loading with another 1500 mg as she received 100 dose from outside facility. [YVETET]   1340 MR brain w and wo IV contrast  ED independent interpretation with signs of left parietal mass [YVETTE]   1453 Patient notified of MRI results [YVETTE]   1454 At the end my shift awaiting CT chest abdomen pelvis as well as EEG and final recommendations from neurology/neurosurgery. [YVETTE]      ED Course User Index  [YVETTE] Jus White PA-C          Diagnoses as of 06/05/24 1529   Seizure (Multi)   Brain mass       Medical Decision Making  59-year-old female presenting as transfer from [facility] after seizure was concerning head CT for underlying brain mass.  On exam she is well-appearing lying bed comfortably.  Vital signs stable.  Logically intact no focal findings.  I spoke with neurosurgery who will see her bedside.  They recommended CT chest abdomen pelvis to rule out primary tumor.  MRI brain.  I spoke with neurology who will see her bedside as well.  Seizure precautions ordered.        Procedure  Procedures     Jus White PA-C  06/05/24 1529

## 2024-06-09 NOTE — PROGRESS NOTES
[facility]  Neurosurgery    Subjective     Sun Zimmerman is a right handed  59 y.o. year old female presenting for follow-up .     PMH HTN, HLD, ZEENTA, scleroderma (positive anti-SCL 70), chronic interstitial cystitis, GERD, fibromyalgia,  chronic venous insufficiency, RLS, S/p Julio Cesar-en-Y bypass, who presented from OSH due to seizure receiving 1.5g Keppra. CTH with large area of edema and concern for left frontal underlying lesion. MRI Brain w/wo demonstrating a 2.7 x 2.9 x2.8cm L frontal lobe mass. CT C/A/P with 0.5cm pulmonary nodules throughout the left upper and lower lobe. Multinodular thyroid gland with thyroid nodule measuring 2.5 x 2.2cm. Patient was recommended for surgical resection and presents for virtual visit to discuss treatment.     Patient denies fevers, headaches, nausea, vomiting, speech difficulty, seizures, double/blurry vision, sensory loss, weakness, incontinence, pain.  She has not had another seizure since being on the keppra and since her recent hospitalization.    Review of Systems   Neurological:  Positive for seizures.   All other systems reviewed and are negative.      Treatment Synopsis:   Brain Tumor: Presumed meningioma      Objective     Performance and Vitals:  KARNOFSKY SCALE WITH ECOG EQUIVALENT:90, Able to carry on normal activity; minor signs or symptoms of disease (ECOG equivalent 0)    There were no vitals filed for this visit.      Neurological Exam  Physical Exam    Imaging:   Imaging Results: CT head wo IV contrast 06/05/2024    Narrative  Interpreted By:  Lisa Caruso,  STUDY:  CT HEAD WO IV CONTRAST;  6/5/2024 7:00 am    INDICATION:  Signs/Symptoms:seizure.    COMPARISON:  MRI brain 10 December 2015    ACCESSION NUMBER(S):  [id number]    ORDERING CLINICIAN:  LISA MORENO    TECHNIQUE:  CT of the brain from the skull vertex to the skull base, without  intravenous contrast    FINDINGS:  ACUTE INTRA-AXIAL HEMORRHAGE:  Negative    ACUTE  EXTRA-AXIAL/SUBDURAL HEMORRHAGE:  Negative    ACUTE INTRACRANIAL MASS EFFECT:  No midline shift or basilar  herniation, but there is probably an element of gyral swelling and  sulcal effacement in the left frontal lobe around the area of  vasogenic edema described below    CT EVIDENCE OF ACUTE / SUBACUTE TERRITORIAL ISCHEMIA:  Negative    VENTRICLES:  Normal caliber and configuration    OTHER BRAIN FINDINGS:  Large area of vasogenic cerebral edema in  subcortical left frontal lobe. No such finding on MRI brain 10  December 2015    INCLUDED PARANASAL SINUSES: All clear    INCLUDED MASTOID AIR CELLS: All clear    SKULL:  No lytic or blastic lesion    EXTRACRANIAL SOFT TISSUES:  Scalp and occular globes grossly normal  by CT    Impression  MRI brain without and with intravenous contrast recommended to  further evaluate a large area of vasogenic edema in the left frontal  lobe, was not present on the only pertinent comparison exam, MRI  brain 10 December 2015. An underlying mass may be present    No other acute intracranial process    No acute intracranial hemorrhage    No compelling CT evidence for large acute territorial infarct    No subdural collection    MACRO:  None    Signed by: Guero Caruso 6/5/2024 7:14 AM  Dictation workstation:   [id number]    MR brain w and wo IV contrast 06/05/2024    Narrative  Interpreted By:  Ella Pulliam,  STUDY:  MR BRAIN W AND WO IV CONTRAST;  6/5/2024 2:04 pm    INDICATION:  Signs/Symptoms:?brain mass, SZ last pm.    COMPARISON:  MRI brain December 10, 2015 and head CT June 5, 2024    ACCESSION NUMBER(S):  [id number]    ORDERING CLINICIAN:  JUAN ROBB    TECHNIQUE:  Axial T2, FLAIR, DWI, gradient echo T2 and  T1 weighted images of  brain were acquired. Post contrast T1 weighted images were acquired  after administration of 26 mL Dotarem gadolinium based intravenous  contrast.    FINDINGS:  Some of the images are degraded by patient motion.    There is an extra-axial,  dural-based mass overlying the left frontal  lobe measuring approximately 2.7 cm in craniocaudal dimension by 2.9  cm in AP dimension by 2.8 cm in transverse dimension. There is  thickening of the adjacent dura. There is associated mass effect on  the adjacent parenchyma. There is also associated vasogenic edema.  There is little to no measurable midline shift.    Diffusion-weighted imaging demonstrates no evidence of diffusion  restriction to suggest the presence of acute ischemic injury.  Prominent perivascular spaces and/or tiny, remote lacunar infarcts  are noted in the basal ganglia. There is no abnormal parenchymal  enhancement.    There is minimal mucosal thickening within scattered paranasal  sinuses and partial opacification of a few inferior mastoid air cells.    Impression  Extra-axial, dural-based enhancing mass overlying the left frontal  lobe with associated mass effect and vasogenic edema is nonspecific  but could represent a meningioma.      MACRO:  None    Signed by: Ella Pulliam 6/5/2024 2:21 PM  Dictation workstation:   [id number]    Lab Results   Component Value Date     06/05/2024    K 4.7 06/05/2024     06/05/2024    CO2 25 06/05/2024    BUN 20 06/05/2024    CREATININE 0.70 06/05/2024    GLUCOSE 104 (H) 06/05/2024    CALCIUM 8.8 06/05/2024     Lab Results   Component Value Date    WBC 6.0 06/05/2024    HGB 12.7 06/05/2024    HCT 40.3 06/05/2024    MCV 85 06/05/2024     06/05/2024         Patient Visit Information:   Visit Type: Virtual Visit - An interactive audio and video telecommunication system which permits real time communications between the patient (at the originating site) and the provider (at the distant site) was utilized to provide this telehealth service. Verbal consent for encounter: Verbal consent was requested and obtained from patient, or from parent/guardian if minor, on this date for a telehealth visit.      Assessment & Plan     Assessment/Plan   I  had a lengthy discussion regarding the clinical and surgical plan for the patient.  I discussed with the patient surgical option of craniotomy and surgical resection of mass. Patient had some questions. I discussed risks including stroke, infection, bleeding, weakness/plegia, sensory loss, seizures, post op pain, visual field cut/deficit, CSF leak, pseudomeningocele, worsening speech, need for further surgery, prolonged hospitalization, risks of anesthesia, blood clot, pneumonia, and death, etc. Benefits include histopathological diagnosis and resection. No guarantees were given. Patient verbalized understanding, and would like to proceed with craniotomy for brain tumor resection.   Diagnoses and all orders for this visit:  Cerebral meningioma (Multi)  -     Case Request Operating Room: CRANIOTOMY, FRONTAL APPROACH, WITH TISSUE EXCISION                    Medical History     Past Medical History:   Diagnosis Date    Encounter for other preprocedural examination 05/25/2017    Pre-op testing    Encounter for preprocedural respiratory examination 05/24/2017    Preop pulmonary/respiratory exam    Hypertension     Other conditions influencing health status 10/14/2016    Difficulty Breathing (Dyspnea)     Past Surgical History:   Procedure Laterality Date    HYSTERECTOMY  06/12/2013    Hysterectomy    KNEE ARTHROSCOPY W/ DEBRIDEMENT  05/09/2013    Arthroscopy Knee Right    MR HEAD ANGIO WO IV CONTRAST  12/10/2015    MR HEAD ANGIO WO IV CONTRAST 12/10/2015 [facility] CLINICAL LEGACY    OTHER SURGICAL HISTORY  01/05/2022    Transobturator sling placement    OTHER SURGICAL HISTORY  01/09/2020    Colonoscopy     Social History     Tobacco Use    Smoking status: Never    Smokeless tobacco: Never     No family history on file.  No Known Allergies  Current Outpatient Medications   Medication Instructions    DULoxetine (CYMBALTA) 60 mg, oral, Daily    gabapentin (NEURONTIN) 600 mg, oral, 3 times daily    hydrOXYzine HCL (ATARAX) 25 mg,  oral, Nightly    levETIRAcetam (KEPPRA) 1,000 mg, oral, 2 times daily    naproxen (NAPROSYN) 500 mg, oral, 2 times daily after meals    omeprazole (PriLOSEC) 40 mg DR capsule TAKE 1 CAPSULE BY MOUTH EVERY DAY----MUST MAKE APPT    propranolol LA (INDERAL LA) 80 mg, oral, Daily

## 2024-06-09 NOTE — H&P (VIEW-ONLY)
[facility]  Neurosurgery    Subjective     Sun Zimmerman is a right handed  59 y.o. year old female presenting for follow-up .     PMH HTN, HLD, ZEENAT, scleroderma (positive anti-SCL 70), chronic interstitial cystitis, GERD, fibromyalgia,  chronic venous insufficiency, RLS, S/p Julio Cesar-en-Y bypass, who presented from OSH due to seizure receiving 1.5g Keppra. CTH with large area of edema and concern for left frontal underlying lesion. MRI Brain w/wo demonstrating a 2.7 x 2.9 x2.8cm L frontal lobe mass. CT C/A/P with 0.5cm pulmonary nodules throughout the left upper and lower lobe. Multinodular thyroid gland with thyroid nodule measuring 2.5 x 2.2cm. Patient was recommended for surgical resection and presents for virtual visit to discuss treatment.     Patient denies fevers, headaches, nausea, vomiting, speech difficulty, seizures, double/blurry vision, sensory loss, weakness, incontinence, pain.  She has not had another seizure since being on the keppra and since her recent hospitalization.    Review of Systems   Neurological:  Positive for seizures.   All other systems reviewed and are negative.      Treatment Synopsis:   Brain Tumor: Presumed meningioma      Objective     Performance and Vitals:  KARNOFSKY SCALE WITH ECOG EQUIVALENT:90, Able to carry on normal activity; minor signs or symptoms of disease (ECOG equivalent 0)    There were no vitals filed for this visit.      Neurological Exam  Physical Exam    Imaging:   Imaging Results: CT head wo IV contrast 06/05/2024    Narrative  Interpreted By:  Lisa Caruso,  STUDY:  CT HEAD WO IV CONTRAST;  6/5/2024 7:00 am    INDICATION:  Signs/Symptoms:seizure.    COMPARISON:  MRI brain 10 December 2015    ACCESSION NUMBER(S):  [id number]    ORDERING CLINICIAN:  LISA MORENO    TECHNIQUE:  CT of the brain from the skull vertex to the skull base, without  intravenous contrast    FINDINGS:  ACUTE INTRA-AXIAL HEMORRHAGE:  Negative    ACUTE  EXTRA-AXIAL/SUBDURAL HEMORRHAGE:  Negative    ACUTE INTRACRANIAL MASS EFFECT:  No midline shift or basilar  herniation, but there is probably an element of gyral swelling and  sulcal effacement in the left frontal lobe around the area of  vasogenic edema described below    CT EVIDENCE OF ACUTE / SUBACUTE TERRITORIAL ISCHEMIA:  Negative    VENTRICLES:  Normal caliber and configuration    OTHER BRAIN FINDINGS:  Large area of vasogenic cerebral edema in  subcortical left frontal lobe. No such finding on MRI brain 10  December 2015    INCLUDED PARANASAL SINUSES: All clear    INCLUDED MASTOID AIR CELLS: All clear    SKULL:  No lytic or blastic lesion    EXTRACRANIAL SOFT TISSUES:  Scalp and occular globes grossly normal  by CT    Impression  MRI brain without and with intravenous contrast recommended to  further evaluate a large area of vasogenic edema in the left frontal  lobe, was not present on the only pertinent comparison exam, MRI  brain 10 December 2015. An underlying mass may be present    No other acute intracranial process    No acute intracranial hemorrhage    No compelling CT evidence for large acute territorial infarct    No subdural collection    MACRO:  None    Signed by: Guero Caruso 6/5/2024 7:14 AM  Dictation workstation:   [id number]    MR brain w and wo IV contrast 06/05/2024    Narrative  Interpreted By:  Ella Pulliam,  STUDY:  MR BRAIN W AND WO IV CONTRAST;  6/5/2024 2:04 pm    INDICATION:  Signs/Symptoms:?brain mass, SZ last pm.    COMPARISON:  MRI brain December 10, 2015 and head CT June 5, 2024    ACCESSION NUMBER(S):  [id number]    ORDERING CLINICIAN:  JUAN ROBB    TECHNIQUE:  Axial T2, FLAIR, DWI, gradient echo T2 and  T1 weighted images of  brain were acquired. Post contrast T1 weighted images were acquired  after administration of 26 mL Dotarem gadolinium based intravenous  contrast.    FINDINGS:  Some of the images are degraded by patient motion.    There is an extra-axial,  dural-based mass overlying the left frontal  lobe measuring approximately 2.7 cm in craniocaudal dimension by 2.9  cm in AP dimension by 2.8 cm in transverse dimension. There is  thickening of the adjacent dura. There is associated mass effect on  the adjacent parenchyma. There is also associated vasogenic edema.  There is little to no measurable midline shift.    Diffusion-weighted imaging demonstrates no evidence of diffusion  restriction to suggest the presence of acute ischemic injury.  Prominent perivascular spaces and/or tiny, remote lacunar infarcts  are noted in the basal ganglia. There is no abnormal parenchymal  enhancement.    There is minimal mucosal thickening within scattered paranasal  sinuses and partial opacification of a few inferior mastoid air cells.    Impression  Extra-axial, dural-based enhancing mass overlying the left frontal  lobe with associated mass effect and vasogenic edema is nonspecific  but could represent a meningioma.      MACRO:  None    Signed by: Ella Pulliam 6/5/2024 2:21 PM  Dictation workstation:   [id number]    Lab Results   Component Value Date     06/05/2024    K 4.7 06/05/2024     06/05/2024    CO2 25 06/05/2024    BUN 20 06/05/2024    CREATININE 0.70 06/05/2024    GLUCOSE 104 (H) 06/05/2024    CALCIUM 8.8 06/05/2024     Lab Results   Component Value Date    WBC 6.0 06/05/2024    HGB 12.7 06/05/2024    HCT 40.3 06/05/2024    MCV 85 06/05/2024     06/05/2024         Patient Visit Information:   Visit Type: Virtual Visit - An interactive audio and video telecommunication system which permits real time communications between the patient (at the originating site) and the provider (at the distant site) was utilized to provide this telehealth service. Verbal consent for encounter: Verbal consent was requested and obtained from patient, or from parent/guardian if minor, on this date for a telehealth visit.      Assessment & Plan     Assessment/Plan   I  had a lengthy discussion regarding the clinical and surgical plan for the patient.  I discussed with the patient surgical option of craniotomy and surgical resection of mass. Patient had some questions. I discussed risks including stroke, infection, bleeding, weakness/plegia, sensory loss, seizures, post op pain, visual field cut/deficit, CSF leak, pseudomeningocele, worsening speech, need for further surgery, prolonged hospitalization, risks of anesthesia, blood clot, pneumonia, and death, etc. Benefits include histopathological diagnosis and resection. No guarantees were given. Patient verbalized understanding, and would like to proceed with craniotomy for brain tumor resection.   Diagnoses and all orders for this visit:  Cerebral meningioma (Multi)  -     Case Request Operating Room: CRANIOTOMY, FRONTAL APPROACH, WITH TISSUE EXCISION                    Medical History     Past Medical History:   Diagnosis Date    Encounter for other preprocedural examination 05/25/2017    Pre-op testing    Encounter for preprocedural respiratory examination 05/24/2017    Preop pulmonary/respiratory exam    Hypertension     Other conditions influencing health status 10/14/2016    Difficulty Breathing (Dyspnea)     Past Surgical History:   Procedure Laterality Date    HYSTERECTOMY  06/12/2013    Hysterectomy    KNEE ARTHROSCOPY W/ DEBRIDEMENT  05/09/2013    Arthroscopy Knee Right    MR HEAD ANGIO WO IV CONTRAST  12/10/2015    MR HEAD ANGIO WO IV CONTRAST 12/10/2015 [facility] CLINICAL LEGACY    OTHER SURGICAL HISTORY  01/05/2022    Transobturator sling placement    OTHER SURGICAL HISTORY  01/09/2020    Colonoscopy     Social History     Tobacco Use    Smoking status: Never    Smokeless tobacco: Never     No family history on file.  No Known Allergies  Current Outpatient Medications   Medication Instructions    DULoxetine (CYMBALTA) 60 mg, oral, Daily    gabapentin (NEURONTIN) 600 mg, oral, 3 times daily    hydrOXYzine HCL (ATARAX) 25 mg,  oral, Nightly    levETIRAcetam (KEPPRA) 1,000 mg, oral, 2 times daily    naproxen (NAPROSYN) 500 mg, oral, 2 times daily after meals    omeprazole (PriLOSEC) 40 mg DR capsule TAKE 1 CAPSULE BY MOUTH EVERY DAY----MUST MAKE APPT    propranolol LA (INDERAL LA) 80 mg, oral, Daily

## 2024-06-10 ENCOUNTER — TELEMEDICINE (OUTPATIENT)
Dept: NEUROSURGERY | Facility: HOSPITAL | Age: 59
End: 2024-06-10
Payer: COMMERCIAL

## 2024-06-10 DIAGNOSIS — D32.0 CEREBRAL MENINGIOMA (MULTI): Primary | ICD-10-CM

## 2024-06-10 PROCEDURE — 99214 OFFICE O/P EST MOD 30 MIN: CPT | Performed by: NEUROLOGICAL SURGERY

## 2024-06-10 PROCEDURE — 99214 OFFICE O/P EST MOD 30 MIN: CPT | Mod: 95 | Performed by: NEUROLOGICAL SURGERY

## 2024-06-12 ENCOUNTER — ANESTHESIA EVENT (OUTPATIENT)
Dept: OPERATING ROOM | Facility: HOSPITAL | Age: 59
End: 2024-06-12

## 2024-06-12 PROBLEM — D32.0 CEREBRAL MENINGIOMA (MULTI): Status: ACTIVE | Noted: 2024-06-10

## 2024-06-13 ENCOUNTER — ANESTHESIA (OUTPATIENT)
Dept: OPERATING ROOM | Facility: HOSPITAL | Age: 59
End: 2024-06-13
Payer: COMMERCIAL

## 2024-06-14 ASSESSMENT — ENCOUNTER SYMPTOMS: SEIZURES: 1

## 2024-06-17 ENCOUNTER — ANESTHESIA EVENT (OUTPATIENT)
Dept: OPERATING ROOM | Facility: HOSPITAL | Age: 59
End: 2024-06-17
Payer: COMMERCIAL

## 2024-06-18 ENCOUNTER — APPOINTMENT (OUTPATIENT)
Dept: RADIOLOGY | Facility: HOSPITAL | Age: 59
End: 2024-06-18
Payer: COMMERCIAL

## 2024-06-18 ENCOUNTER — HOSPITAL ENCOUNTER (INPATIENT)
Facility: HOSPITAL | Age: 59
LOS: 2 days | Discharge: HOME HEALTH | End: 2024-06-20
Attending: NEUROLOGICAL SURGERY | Admitting: NEUROLOGICAL SURGERY
Payer: COMMERCIAL

## 2024-06-18 ENCOUNTER — ANESTHESIA (OUTPATIENT)
Dept: OPERATING ROOM | Facility: HOSPITAL | Age: 59
End: 2024-06-18
Payer: COMMERCIAL

## 2024-06-18 DIAGNOSIS — D32.0 CEREBRAL MENINGIOMA (MULTI): ICD-10-CM

## 2024-06-18 DIAGNOSIS — K59.03 CONSTIPATION DUE TO PAIN MEDICATION: ICD-10-CM

## 2024-06-18 DIAGNOSIS — Z74.09 IMPAIRED FUNCTIONAL MOBILITY AND ENDURANCE: ICD-10-CM

## 2024-06-18 DIAGNOSIS — M17.12 UNILATERAL PRIMARY OSTEOARTHRITIS, LEFT KNEE: ICD-10-CM

## 2024-06-18 DIAGNOSIS — G89.18 POST-OP PAIN: ICD-10-CM

## 2024-06-18 DIAGNOSIS — G93.89 BRAIN MASS: Primary | ICD-10-CM

## 2024-06-18 LAB
ABO GROUP (TYPE) IN BLOOD: NORMAL
ANION GAP BLDA CALCULATED.4IONS-SCNC: 10 MMO/L (ref 10–25)
ANION GAP BLDA CALCULATED.4IONS-SCNC: 10 MMO/L (ref 10–25)
ANTIBODY SCREEN: NORMAL
BASE EXCESS BLDA CALC-SCNC: -0.1 MMOL/L (ref -2–3)
BASE EXCESS BLDA CALC-SCNC: -2.2 MMOL/L (ref -2–3)
BODY TEMPERATURE: 37 DEGREES CELSIUS
BODY TEMPERATURE: 37 DEGREES CELSIUS
CA-I BLDA-SCNC: 1.11 MMOL/L (ref 1.1–1.33)
CA-I BLDA-SCNC: 1.13 MMOL/L (ref 1.1–1.33)
CHLORIDE BLDA-SCNC: 103 MMOL/L (ref 98–107)
CHLORIDE BLDA-SCNC: 99 MMOL/L (ref 98–107)
GLUCOSE BLDA-MCNC: 107 MG/DL (ref 74–99)
GLUCOSE BLDA-MCNC: 129 MG/DL (ref 74–99)
HCO3 BLDA-SCNC: 22.4 MMOL/L (ref 22–26)
HCO3 BLDA-SCNC: 23.8 MMOL/L (ref 22–26)
HCT VFR BLD EST: 33 % (ref 36–46)
HCT VFR BLD EST: 33 % (ref 36–46)
HGB BLDA-MCNC: 10.9 G/DL (ref 12–16)
HGB BLDA-MCNC: 11 G/DL (ref 12–16)
INHALED O2 CONCENTRATION: 60 %
INHALED O2 CONCENTRATION: 60 %
LACTATE BLDA-SCNC: 1 MMOL/L (ref 0.4–2)
LACTATE BLDA-SCNC: 1 MMOL/L (ref 0.4–2)
OXYHGB MFR BLDA: 97.1 % (ref 94–98)
OXYHGB MFR BLDA: 97.3 % (ref 94–98)
PCO2 BLDA: 35 MM HG (ref 38–42)
PCO2 BLDA: 37 MM HG (ref 38–42)
PH BLDA: 7.39 PH (ref 7.38–7.42)
PH BLDA: 7.44 PH (ref 7.38–7.42)
PO2 BLDA: 133 MM HG (ref 85–95)
PO2 BLDA: 149 MM HG (ref 85–95)
POTASSIUM BLDA-SCNC: 3.6 MMOL/L (ref 3.5–5.3)
POTASSIUM BLDA-SCNC: 4.5 MMOL/L (ref 3.5–5.3)
RH FACTOR (ANTIGEN D): NORMAL
SAO2 % BLDA: 97 % (ref 94–100)
SAO2 % BLDA: 98 % (ref 94–100)
SODIUM BLDA-SCNC: 128 MMOL/L (ref 136–145)
SODIUM BLDA-SCNC: 132 MMOL/L (ref 136–145)

## 2024-06-18 PROCEDURE — 84132 ASSAY OF SERUM POTASSIUM: CPT | Performed by: STUDENT IN AN ORGANIZED HEALTH CARE EDUCATION/TRAINING PROGRAM

## 2024-06-18 PROCEDURE — 7100000001 HC RECOVERY ROOM TIME - INITIAL BASE CHARGE: Performed by: NEUROLOGICAL SURGERY

## 2024-06-18 PROCEDURE — 70450 CT HEAD/BRAIN W/O DYE: CPT | Performed by: RADIOLOGY

## 2024-06-18 PROCEDURE — 61781 SCAN PROC CRANIAL INTRA: CPT | Performed by: NEUROLOGICAL SURGERY

## 2024-06-18 PROCEDURE — 36415 COLL VENOUS BLD VENIPUNCTURE: CPT | Performed by: NEUROLOGICAL SURGERY

## 2024-06-18 PROCEDURE — 00B10ZZ EXCISION OF CEREBRAL MENINGES, OPEN APPROACH: ICD-10-PCS | Performed by: NEUROLOGICAL SURGERY

## 2024-06-18 PROCEDURE — 2500000005 HC RX 250 GENERAL PHARMACY W/O HCPCS: Performed by: STUDENT IN AN ORGANIZED HEALTH CARE EDUCATION/TRAINING PROGRAM

## 2024-06-18 PROCEDURE — 2500000004 HC RX 250 GENERAL PHARMACY W/ HCPCS (ALT 636 FOR OP/ED): Performed by: STUDENT IN AN ORGANIZED HEALTH CARE EDUCATION/TRAINING PROGRAM

## 2024-06-18 PROCEDURE — 2500000001 HC RX 250 WO HCPCS SELF ADMINISTERED DRUGS (ALT 637 FOR MEDICARE OP): Performed by: STUDENT IN AN ORGANIZED HEALTH CARE EDUCATION/TRAINING PROGRAM

## 2024-06-18 PROCEDURE — 88307 TISSUE EXAM BY PATHOLOGIST: CPT | Mod: TC,SUR | Performed by: NEUROLOGICAL SURGERY

## 2024-06-18 PROCEDURE — C1713 ANCHOR/SCREW BN/BN,TIS/BN: HCPCS | Performed by: NEUROLOGICAL SURGERY

## 2024-06-18 PROCEDURE — 61512 CRNEC TREPH EXC MNGIOMA STTL: CPT | Performed by: NEUROLOGICAL SURGERY

## 2024-06-18 PROCEDURE — 8E09XBZ COMPUTER ASSISTED PROCEDURE OF HEAD AND NECK REGION: ICD-10-PCS | Performed by: NEUROLOGICAL SURGERY

## 2024-06-18 PROCEDURE — 2500000001 HC RX 250 WO HCPCS SELF ADMINISTERED DRUGS (ALT 637 FOR MEDICARE OP): Performed by: NEUROLOGICAL SURGERY

## 2024-06-18 PROCEDURE — 2060000001 HC INTERMEDIATE ICU ROOM DAILY

## 2024-06-18 PROCEDURE — C1763 CONN TISS, NON-HUMAN: HCPCS | Performed by: NEUROLOGICAL SURGERY

## 2024-06-18 PROCEDURE — 3600000010 HC OR TIME - EACH INCREMENTAL 1 MINUTE - PROCEDURE LEVEL FIVE: Performed by: NEUROLOGICAL SURGERY

## 2024-06-18 PROCEDURE — 3700000002 HC GENERAL ANESTHESIA TIME - EACH INCREMENTAL 1 MINUTE: Performed by: NEUROLOGICAL SURGERY

## 2024-06-18 PROCEDURE — C9248 INJ, CLEVIDIPINE BUTYRATE: HCPCS | Performed by: STUDENT IN AN ORGANIZED HEALTH CARE EDUCATION/TRAINING PROGRAM

## 2024-06-18 PROCEDURE — 2780000003 HC OR 278 NO HCPCS: Performed by: NEUROLOGICAL SURGERY

## 2024-06-18 PROCEDURE — 3600000005 HC OR TIME - INITIAL BASE CHARGE - PROCEDURE LEVEL FIVE: Performed by: NEUROLOGICAL SURGERY

## 2024-06-18 PROCEDURE — 2500000004 HC RX 250 GENERAL PHARMACY W/ HCPCS (ALT 636 FOR OP/ED): Performed by: NEUROLOGICAL SURGERY

## 2024-06-18 PROCEDURE — 2500000005 HC RX 250 GENERAL PHARMACY W/O HCPCS: Performed by: NEUROLOGICAL SURGERY

## 2024-06-18 PROCEDURE — 2720000007 HC OR 272 NO HCPCS: Performed by: NEUROLOGICAL SURGERY

## 2024-06-18 PROCEDURE — A4217 STERILE WATER/SALINE, 500 ML: HCPCS | Performed by: NEUROLOGICAL SURGERY

## 2024-06-18 PROCEDURE — 7100000002 HC RECOVERY ROOM TIME - EACH INCREMENTAL 1 MINUTE: Performed by: NEUROLOGICAL SURGERY

## 2024-06-18 PROCEDURE — P9047 ALBUMIN (HUMAN), 25%, 50ML: HCPCS | Mod: JZ | Performed by: STUDENT IN AN ORGANIZED HEALTH CARE EDUCATION/TRAINING PROGRAM

## 2024-06-18 PROCEDURE — 70450 CT HEAD/BRAIN W/O DYE: CPT

## 2024-06-18 PROCEDURE — 3700000001 HC GENERAL ANESTHESIA TIME - INITIAL BASE CHARGE: Performed by: NEUROLOGICAL SURGERY

## 2024-06-18 PROCEDURE — 86901 BLOOD TYPING SEROLOGIC RH(D): CPT | Performed by: NEUROLOGICAL SURGERY

## 2024-06-18 DEVICE — CROSS PIN, AXS SELF-TAP, 1.5 X 4MM: Type: IMPLANTABLE DEVICE | Site: CRANIAL | Status: FUNCTIONAL

## 2024-06-18 DEVICE — DURAGEN® PLUS DURAL REGENERATION MATRIX, 3 IN X 3 IN (7.5 CM X 7.5 CM)
Type: IMPLANTABLE DEVICE | Site: BRAIN | Status: FUNCTIONAL
Brand: DURAGEN® PLUS

## 2024-06-18 DEVICE — IMPLANTABLE DEVICE: Type: IMPLANTABLE DEVICE | Site: CRANIAL | Status: FUNCTIONAL

## 2024-06-18 RX ORDER — SODIUM CHLORIDE 9 MG/ML
75 INJECTION, SOLUTION INTRAVENOUS CONTINUOUS
Status: DISCONTINUED | OUTPATIENT
Start: 2024-06-18 | End: 2024-06-19

## 2024-06-18 RX ORDER — HYDROMORPHONE HYDROCHLORIDE 1 MG/ML
0.2 INJECTION, SOLUTION INTRAMUSCULAR; INTRAVENOUS; SUBCUTANEOUS EVERY 5 MIN PRN
Status: DISCONTINUED | OUTPATIENT
Start: 2024-06-18 | End: 2024-06-18 | Stop reason: HOSPADM

## 2024-06-18 RX ORDER — SODIUM CHLORIDE 0.9 G/100ML
IRRIGANT IRRIGATION AS NEEDED
Status: DISCONTINUED | OUTPATIENT
Start: 2024-06-18 | End: 2024-06-18 | Stop reason: HOSPADM

## 2024-06-18 RX ORDER — ACETAMINOPHEN 325 MG/1
650 TABLET ORAL EVERY 4 HOURS PRN
Status: DISCONTINUED | OUTPATIENT
Start: 2024-06-18 | End: 2024-06-18 | Stop reason: HOSPADM

## 2024-06-18 RX ORDER — POLYETHYLENE GLYCOL 3350 17 G/17G
17 POWDER, FOR SOLUTION ORAL DAILY
Status: DISCONTINUED | OUTPATIENT
Start: 2024-06-18 | End: 2024-06-20 | Stop reason: HOSPADM

## 2024-06-18 RX ORDER — MAGNESIUM HYDROXIDE 2400 MG/10ML
10 SUSPENSION ORAL DAILY PRN
Status: DISCONTINUED | OUTPATIENT
Start: 2024-06-18 | End: 2024-06-20 | Stop reason: HOSPADM

## 2024-06-18 RX ORDER — HYDRALAZINE HYDROCHLORIDE 20 MG/ML
10 INJECTION INTRAMUSCULAR; INTRAVENOUS
Status: DISCONTINUED | OUTPATIENT
Start: 2024-06-18 | End: 2024-06-20 | Stop reason: HOSPADM

## 2024-06-18 RX ORDER — SODIUM CHLORIDE, SODIUM LACTATE, POTASSIUM CHLORIDE, CALCIUM CHLORIDE 600; 310; 30; 20 MG/100ML; MG/100ML; MG/100ML; MG/100ML
INJECTION, SOLUTION INTRAVENOUS CONTINUOUS PRN
Status: DISCONTINUED | OUTPATIENT
Start: 2024-06-18 | End: 2024-06-18

## 2024-06-18 RX ORDER — SODIUM CHLORIDE, SODIUM LACTATE, POTASSIUM CHLORIDE, CALCIUM CHLORIDE 600; 310; 30; 20 MG/100ML; MG/100ML; MG/100ML; MG/100ML
100 INJECTION, SOLUTION INTRAVENOUS CONTINUOUS
Status: DISCONTINUED | OUTPATIENT
Start: 2024-06-18 | End: 2024-06-18 | Stop reason: HOSPADM

## 2024-06-18 RX ORDER — LIDOCAINE HYDROCHLORIDE 10 MG/ML
0.1 INJECTION INFILTRATION; PERINEURAL ONCE
Status: DISCONTINUED | OUTPATIENT
Start: 2024-06-18 | End: 2024-06-18 | Stop reason: HOSPADM

## 2024-06-18 RX ORDER — OXYCODONE HYDROCHLORIDE 5 MG/1
2.5 TABLET ORAL EVERY 4 HOURS PRN
Status: DISCONTINUED | OUTPATIENT
Start: 2024-06-18 | End: 2024-06-20 | Stop reason: HOSPADM

## 2024-06-18 RX ORDER — LEVETIRACETAM 500 MG/5ML
INJECTION, SOLUTION, CONCENTRATE INTRAVENOUS AS NEEDED
Status: DISCONTINUED | OUTPATIENT
Start: 2024-06-18 | End: 2024-06-18

## 2024-06-18 RX ORDER — ONDANSETRON HYDROCHLORIDE 2 MG/ML
4 INJECTION, SOLUTION INTRAVENOUS ONCE AS NEEDED
Status: DISCONTINUED | OUTPATIENT
Start: 2024-06-18 | End: 2024-06-18 | Stop reason: HOSPADM

## 2024-06-18 RX ORDER — BACITRACIN 500 [USP'U]/G
OINTMENT TOPICAL AS NEEDED
Status: DISCONTINUED | OUTPATIENT
Start: 2024-06-18 | End: 2024-06-18 | Stop reason: HOSPADM

## 2024-06-18 RX ORDER — ALBUTEROL SULFATE 0.83 MG/ML
2.5 SOLUTION RESPIRATORY (INHALATION) ONCE AS NEEDED
Status: DISCONTINUED | OUTPATIENT
Start: 2024-06-18 | End: 2024-06-18 | Stop reason: HOSPADM

## 2024-06-18 RX ORDER — METOCLOPRAMIDE 10 MG/1
10 TABLET ORAL EVERY 6 HOURS PRN
Status: DISCONTINUED | OUTPATIENT
Start: 2024-06-18 | End: 2024-06-20 | Stop reason: HOSPADM

## 2024-06-18 RX ORDER — PROPOFOL 10 MG/ML
INJECTION, EMULSION INTRAVENOUS AS NEEDED
Status: DISCONTINUED | OUTPATIENT
Start: 2024-06-18 | End: 2024-06-18

## 2024-06-18 RX ORDER — BISACODYL 10 MG/1
10 SUPPOSITORY RECTAL DAILY PRN
Status: DISCONTINUED | OUTPATIENT
Start: 2024-06-18 | End: 2024-06-20 | Stop reason: HOSPADM

## 2024-06-18 RX ORDER — ACETAMINOPHEN 500 MG
1 TABLET ORAL EVERY 6 HOURS PRN
COMMUNITY

## 2024-06-18 RX ORDER — ONDANSETRON 4 MG/1
4 TABLET, FILM COATED ORAL EVERY 8 HOURS PRN
Status: DISCONTINUED | OUTPATIENT
Start: 2024-06-18 | End: 2024-06-20 | Stop reason: HOSPADM

## 2024-06-18 RX ORDER — LABETALOL HYDROCHLORIDE 5 MG/ML
5 INJECTION, SOLUTION INTRAVENOUS ONCE AS NEEDED
Status: DISCONTINUED | OUTPATIENT
Start: 2024-06-18 | End: 2024-06-18

## 2024-06-18 RX ORDER — ONDANSETRON HYDROCHLORIDE 2 MG/ML
4 INJECTION, SOLUTION INTRAVENOUS EVERY 8 HOURS PRN
Status: DISCONTINUED | OUTPATIENT
Start: 2024-06-18 | End: 2024-06-20 | Stop reason: HOSPADM

## 2024-06-18 RX ORDER — FENTANYL CITRATE 50 UG/ML
INJECTION, SOLUTION INTRAMUSCULAR; INTRAVENOUS AS NEEDED
Status: DISCONTINUED | OUTPATIENT
Start: 2024-06-18 | End: 2024-06-18

## 2024-06-18 RX ORDER — HYDROMORPHONE HYDROCHLORIDE 1 MG/ML
0.2 INJECTION, SOLUTION INTRAMUSCULAR; INTRAVENOUS; SUBCUTANEOUS EVERY 4 HOURS PRN
Status: DISCONTINUED | OUTPATIENT
Start: 2024-06-18 | End: 2024-06-20 | Stop reason: HOSPADM

## 2024-06-18 RX ORDER — PANTOPRAZOLE SODIUM 40 MG/1
40 TABLET, DELAYED RELEASE ORAL
Status: DISCONTINUED | OUTPATIENT
Start: 2024-06-19 | End: 2024-06-20 | Stop reason: HOSPADM

## 2024-06-18 RX ORDER — LABETALOL HYDROCHLORIDE 5 MG/ML
10 INJECTION, SOLUTION INTRAVENOUS EVERY 10 MIN PRN
Status: DISCONTINUED | OUTPATIENT
Start: 2024-06-18 | End: 2024-06-20 | Stop reason: HOSPADM

## 2024-06-18 RX ORDER — ACETAMINOPHEN 325 MG/1
650 TABLET ORAL EVERY 6 HOURS
Status: DISCONTINUED | OUTPATIENT
Start: 2024-06-18 | End: 2024-06-20 | Stop reason: HOSPADM

## 2024-06-18 RX ORDER — LIDOCAINE HYDROCHLORIDE 20 MG/ML
INJECTION, SOLUTION INFILTRATION; PERINEURAL AS NEEDED
Status: DISCONTINUED | OUTPATIENT
Start: 2024-06-18 | End: 2024-06-18

## 2024-06-18 RX ORDER — OXYCODONE HYDROCHLORIDE 5 MG/1
10 TABLET ORAL EVERY 4 HOURS PRN
Status: DISCONTINUED | OUTPATIENT
Start: 2024-06-18 | End: 2024-06-20 | Stop reason: HOSPADM

## 2024-06-18 RX ORDER — HYDROXYZINE HYDROCHLORIDE 25 MG/1
25 TABLET, FILM COATED ORAL NIGHTLY
Status: DISCONTINUED | OUTPATIENT
Start: 2024-06-18 | End: 2024-06-20 | Stop reason: HOSPADM

## 2024-06-18 RX ORDER — ROCURONIUM BROMIDE 10 MG/ML
INJECTION, SOLUTION INTRAVENOUS AS NEEDED
Status: DISCONTINUED | OUTPATIENT
Start: 2024-06-18 | End: 2024-06-18

## 2024-06-18 RX ORDER — ALBUMIN HUMAN 250 G/1000ML
SOLUTION INTRAVENOUS CONTINUOUS PRN
Status: DISCONTINUED | OUTPATIENT
Start: 2024-06-18 | End: 2024-06-18

## 2024-06-18 RX ORDER — CEFAZOLIN 1 G/1
INJECTION, POWDER, FOR SOLUTION INTRAVENOUS AS NEEDED
Status: DISCONTINUED | OUTPATIENT
Start: 2024-06-18 | End: 2024-06-18

## 2024-06-18 RX ORDER — OXYCODONE HYDROCHLORIDE 5 MG/1
5 TABLET ORAL EVERY 4 HOURS PRN
Status: DISCONTINUED | OUTPATIENT
Start: 2024-06-18 | End: 2024-06-18 | Stop reason: HOSPADM

## 2024-06-18 RX ORDER — NALOXONE HYDROCHLORIDE 0.4 MG/ML
0.2 INJECTION, SOLUTION INTRAMUSCULAR; INTRAVENOUS; SUBCUTANEOUS EVERY 5 MIN PRN
Status: DISCONTINUED | OUTPATIENT
Start: 2024-06-18 | End: 2024-06-20 | Stop reason: HOSPADM

## 2024-06-18 RX ORDER — MIDAZOLAM HYDROCHLORIDE 1 MG/ML
INJECTION INTRAMUSCULAR; INTRAVENOUS AS NEEDED
Status: DISCONTINUED | OUTPATIENT
Start: 2024-06-18 | End: 2024-06-18

## 2024-06-18 RX ORDER — OXYCODONE HYDROCHLORIDE 5 MG/1
5 TABLET ORAL EVERY 4 HOURS PRN
Status: DISCONTINUED | OUTPATIENT
Start: 2024-06-18 | End: 2024-06-20 | Stop reason: HOSPADM

## 2024-06-18 RX ORDER — MANNITOL 20 G/100ML
INJECTION, SOLUTION INTRAVENOUS AS NEEDED
Status: DISCONTINUED | OUTPATIENT
Start: 2024-06-18 | End: 2024-06-18

## 2024-06-18 RX ORDER — LIDOCAINE HYDROCHLORIDE AND EPINEPHRINE 5; 5 MG/ML; UG/ML
INJECTION, SOLUTION INFILTRATION; PERINEURAL AS NEEDED
Status: DISCONTINUED | OUTPATIENT
Start: 2024-06-18 | End: 2024-06-18 | Stop reason: HOSPADM

## 2024-06-18 RX ORDER — AMOXICILLIN 250 MG
2 CAPSULE ORAL 2 TIMES DAILY
Status: DISCONTINUED | OUTPATIENT
Start: 2024-06-18 | End: 2024-06-20 | Stop reason: HOSPADM

## 2024-06-18 RX ORDER — LABETALOL HYDROCHLORIDE 5 MG/ML
5 INJECTION, SOLUTION INTRAVENOUS ONCE AS NEEDED
Status: DISCONTINUED | OUTPATIENT
Start: 2024-06-18 | End: 2024-06-18 | Stop reason: HOSPADM

## 2024-06-18 RX ORDER — METOCLOPRAMIDE HYDROCHLORIDE 5 MG/ML
10 INJECTION INTRAMUSCULAR; INTRAVENOUS EVERY 6 HOURS PRN
Status: DISCONTINUED | OUTPATIENT
Start: 2024-06-18 | End: 2024-06-20 | Stop reason: HOSPADM

## 2024-06-18 RX ORDER — PROPOFOL 10 MG/ML
INJECTION, EMULSION INTRAVENOUS CONTINUOUS PRN
Status: DISCONTINUED | OUTPATIENT
Start: 2024-06-18 | End: 2024-06-18

## 2024-06-18 RX ORDER — LEVETIRACETAM 500 MG/1
1000 TABLET ORAL 2 TIMES DAILY
Status: DISCONTINUED | OUTPATIENT
Start: 2024-06-18 | End: 2024-06-20 | Stop reason: HOSPADM

## 2024-06-18 RX ORDER — HYDROMORPHONE HYDROCHLORIDE 1 MG/ML
0.5 INJECTION, SOLUTION INTRAMUSCULAR; INTRAVENOUS; SUBCUTANEOUS EVERY 5 MIN PRN
Status: DISCONTINUED | OUTPATIENT
Start: 2024-06-18 | End: 2024-06-18 | Stop reason: HOSPADM

## 2024-06-18 RX ORDER — PROPRANOLOL HYDROCHLORIDE 80 MG/1
80 CAPSULE, EXTENDED RELEASE ORAL DAILY
Status: DISCONTINUED | OUTPATIENT
Start: 2024-06-18 | End: 2024-06-20 | Stop reason: HOSPADM

## 2024-06-18 RX ORDER — DULOXETIN HYDROCHLORIDE 60 MG/1
60 CAPSULE, DELAYED RELEASE ORAL DAILY
Status: DISCONTINUED | OUTPATIENT
Start: 2024-06-18 | End: 2024-06-20 | Stop reason: HOSPADM

## 2024-06-18 RX ORDER — ESMOLOL HYDROCHLORIDE 10 MG/ML
INJECTION INTRAVENOUS AS NEEDED
Status: DISCONTINUED | OUTPATIENT
Start: 2024-06-18 | End: 2024-06-18

## 2024-06-18 RX ORDER — GABAPENTIN 300 MG/1
600 CAPSULE ORAL 3 TIMES DAILY
Status: DISCONTINUED | OUTPATIENT
Start: 2024-06-18 | End: 2024-06-20 | Stop reason: HOSPADM

## 2024-06-18 ASSESSMENT — PAIN - FUNCTIONAL ASSESSMENT
PAIN_FUNCTIONAL_ASSESSMENT: 0-10

## 2024-06-18 ASSESSMENT — PAIN SCALES - GENERAL
PAINLEVEL_OUTOF10: 10 - WORST POSSIBLE PAIN
PAINLEVEL_OUTOF10: 6
PAINLEVEL_OUTOF10: 10 - WORST POSSIBLE PAIN
PAINLEVEL_OUTOF10: 0 - NO PAIN
PAINLEVEL_OUTOF10: 4
PAINLEVEL_OUTOF10: 4
PAINLEVEL_OUTOF10: 3
PAINLEVEL_OUTOF10: 6

## 2024-06-18 ASSESSMENT — ENCOUNTER SYMPTOMS
LOSS OF SENSATION IN FEET: 0
DEPRESSION: 0
OCCASIONAL FEELINGS OF UNSTEADINESS: 0

## 2024-06-18 ASSESSMENT — COLUMBIA-SUICIDE SEVERITY RATING SCALE - C-SSRS
6. HAVE YOU EVER DONE ANYTHING, STARTED TO DO ANYTHING, OR PREPARED TO DO ANYTHING TO END YOUR LIFE?: NO
2. HAVE YOU ACTUALLY HAD ANY THOUGHTS OF KILLING YOURSELF?: NO
1. IN THE PAST MONTH, HAVE YOU WISHED YOU WERE DEAD OR WISHED YOU COULD GO TO SLEEP AND NOT WAKE UP?: NO

## 2024-06-18 ASSESSMENT — PAIN SCALES - WONG BAKER: WONGBAKER_NUMERICALRESPONSE: HURTS WHOLE LOT

## 2024-06-18 NOTE — ANESTHESIA PROCEDURE NOTES
Airway  Date/Time: 6/18/2024 11:56 AM  Urgency: elective    Airway not difficult    Staffing  Performed: resident   Authorized by: Gilbert Zelaya MD    Performed by: Avril Irving MD  Patient location during procedure: OR    Indications and Patient Condition  Indications for airway management: anesthesia  Spontaneous Ventilation: absent  Sedation level: deep  Preoxygenated: yes  Patient position: sniffing  MILS maintained throughout  Mask difficulty assessment: 2 - vent by mask + OA or adjuvant +/- NMBA  Planned trial extubation    Final Airway Details  Final airway type: endotracheal airway      Successful airway: ETT  Cuffed: yes   Successful intubation technique: direct laryngoscopy  Facilitating devices/methods: intubating stylet and anterior pressure/BURP  Endotracheal tube insertion site: oral  Blade: Mayra  Blade size: #3  ETT size (mm): 7.0  Cormack-Lehane Classification: grade I - full view of glottis  Placement verified by: chest auscultation and capnometry   Measured from: lips  ETT to lips (cm): 22  Number of attempts at approach: 1  Number of other approaches attempted: 0

## 2024-06-18 NOTE — ANESTHESIA POSTPROCEDURE EVALUATION
Patient: Sun Zimmerman    Procedure Summary       Date: 06/18/24 Room / Location: [facility] OR 25 / Virtual [facility] OR    Anesthesia Start: 1145 Anesthesia Stop: 1607    Procedure: CRANIOTOMY, FRONTAL APPROACH, WITH TISSUE EXCISION (Left: Head) Diagnosis:       Cerebral meningioma (Multi)      (Cerebral meningioma (Multi) [D32.0])    Surgeons: Kamini Smith MD Responsible Provider: Gilbert Zelaya MD    Anesthesia Type: general ASA Status: 3            Anesthesia Type: general    Vitals Value Taken Time   /83 06/18/24 1601   Temp 36.3 °C (97.3 °F) 06/18/24 1553   Pulse 71 06/18/24 1603   Resp 14 06/18/24 1603   SpO2 92 % 06/18/24 1603   Vitals shown include unfiled device data.    Anesthesia Post Evaluation    Patient participation: complete - patient cannot participate  Level of consciousness: awake and sleepy but conscious  Pain management: adequate  Airway patency: patent  Cardiovascular status: acceptable  Respiratory status: acceptable and face mask (nasal trumpet)  Hydration status: acceptable  Postoperative Nausea and Vomiting: none        No notable events documented.

## 2024-06-18 NOTE — ANESTHESIA PROCEDURE NOTES
Peripheral IV  Date/Time: 6/18/2024 1:25 PM  Inserted by: Karen Durant MD    Placement  Needle size: 20 G  Laterality: right  Location: hand  Local anesthetic: none  Site prep: chlorhexidine  Technique: anatomical landmarks  Attempts: 1

## 2024-06-18 NOTE — ANESTHESIA PREPROCEDURE EVALUATION
Patient: Sun Zimmerman    Procedure Information       Date/Time: 06/18/24 1015    Procedure: CRANIOTOMY, FRONTAL APPROACH, WITH TISSUE EXCISION (Left) - Craniotomy for meningioma resection    Location: [facility] OR 25 / Virtual [facility] OR    Surgeons: Kamini Smith MD            Relevant Problems   No relevant active problems       Clinical information reviewed:   Tobacco  Allergies  Meds   Med Hx  Surg Hx  OB Status  Fam Hx  Soc   Hx        NPO Detail:  NPO/Void Status  Carbohydrate Drink Given Prior to Surgery? : N  Date of Last Liquid: 06/17/24  Time of Last Liquid: 1800  Date of Last Solid: 06/17/24  Time of Last Solid: 1800  Last Intake Type: Clear fluids  Time of Last Void: 0933    pasha Zimmerman is a right handed  59 y.o. year old female presenting for follow-up .      PMH HTN, HLD, ZEENAT, scleroderma (positive anti-SCL 70), chronic interstitial cystitis, GERD, fibromyalgia,  chronic venous insufficiency, RLS, S/p Julio Cesar-en-Y bypass, who presented from OSH due to seizure receiving 1.5g Keppra. CTH with large area of edema and concern for left frontal underlying lesion. MRI Brain w/wo demonstrating a 2.7 x 2.9 x2.8cm L frontal lobe mass. CT C/A/P with 0.5cm pulmonary nodules throughout the left upper and lower lobe. Multinodular thyroid gland with thyroid nodule measuring 2.5 x 2.2cm.      Patient denies fevers, headaches, nausea, vomiting, speech difficulty, seizures, double/blurry vision, sensory loss, weakness, incontinence, pain.  She has not had another seizure since being on the keppra and since her recent hospitalization.        Physical Exam    Airway  Mallampati: III  TM distance: >3 FB     Cardiovascular - normal exam     Dental    Pulmonary - normal exam     Abdominal            Anesthesia Plan    History of general anesthesia?: yes  History of complications of general anesthesia?: no    ASA 3     general     Anesthetic plan and risks discussed with patient.  Use of blood  products discussed with patient who.    Plan discussed with resident.

## 2024-06-18 NOTE — PROGRESS NOTES
Pharmacy Medication History Review    Sun Zimmerman is a 59 y.o. female admitted for Cerebral meningioma (Multi). Pharmacy reviewed the patient's [id number] medications and allergies for accuracy.    The list below reflects the updated PTA list. Comments regarding how patient may be taking medications differently can be found in the Admit Orders Activity  Prior to Admission Medications   Prescriptions Last Dose Informant   DULoxetine (Cymbalta) 60 mg DR capsule 6/17/2024 Self   Sig: Take 1 capsule (60 mg) by mouth once daily.   acetaminophen (Tylenol) 500 mg tablet 6/17/2024 Self   Sig: Take 1 tablet (500 mg) by mouth every 6 hours if needed.   gabapentin (Neurontin) 600 mg tablet 6/17/2024 Self   Sig: Take 1 tablet (600 mg) by mouth 3 times a day.   hydrOXYzine HCL (Atarax) 25 mg tablet 6/17/2024 Self   Sig: Take 1 tablet (25 mg) by mouth once daily at bedtime.   levETIRAcetam (Keppra) 1,000 mg tablet 6/17/2024 at evening Self   Sig: Take 1 tablet (1,000 mg) by mouth 2 times a day.   naproxen (Naprosyn) 500 mg tablet 6/11/2024 Self   Sig: TAKE 1 TABLET BY MOUTH EVERY 12 HOURS WITH FOOD AS NEEDED   omeprazole (PriLOSEC) 40 mg DR capsule 6/17/2024 Self   Sig: TAKE 1 CAPSULE BY MOUTH EVERY DAY----MUST MAKE APPT   propranolol LA (Inderal LA) 80 mg 24 hr capsule 6/17/2024 Self   Sig: Take 1 capsule (80 mg) by mouth once daily.      Facility-Administered Medications: None        The list below reflects the updated allergy list. Please review each documented allergy for additional clarification and justification.  Allergies  Reviewed by Mazin Slaughter RN on 6/18/2024   No Known Allergies         Patient declines M2B at discharge. Pharmacy has been updated to [facility] in [place], [place].    Sources:    -patient interview  -outpatient pharmacy dispense history  -Care Everywhere medication lists  -OARRS    Below are additional concerns with the patient's PTA list: none      Jacob Agarwal, [facility]  Transitions of Care  Pharmacist   Meds Ambulatory and Retail Services  Please reach out via Secure Chat for questions, or if no response call [employer] or [facility]

## 2024-06-18 NOTE — ANESTHESIA PROCEDURE NOTES
Arterial Line:    Date/Time: 6/18/2024 12:10 PM    Staffing  Performed: resident   Authorized by: Gilbert Zelaya MD    Performed by: Avril Irving MD    An arterial line was placed. Procedure performed using surface landmarks.in the OR for the following indication(s): continuous blood pressure monitoring.    A 20 gauge (size), 1 and 3/8 inch (length), Angiocath (type) catheter was placed into the Right radial artery, secured by Tegaderm,   Seldinger technique used.  Events:  patient tolerated procedure well with no complications.             gradual onset

## 2024-06-18 NOTE — ANESTHESIA PROCEDURE NOTES
Peripheral IV  Date/Time: 6/18/2024 12:15 PM  Inserted by: Gilbert Zelaya MD    Placement  Needle size: 18 G  Laterality: left  Location: wrist  Local anesthetic: none  Site prep: chlorhexidine  Technique: anatomical landmarks  Attempts: 2  Difficult Venous Access: Yes  Unsuccessful Attempt Location(s): left arm

## 2024-06-19 ENCOUNTER — HOME HEALTH ADMISSION (OUTPATIENT)
Dept: HOME HEALTH SERVICES | Facility: HOME HEALTH | Age: 59
End: 2024-06-19
Payer: COMMERCIAL

## 2024-06-19 ENCOUNTER — APPOINTMENT (OUTPATIENT)
Dept: RADIOLOGY | Facility: HOSPITAL | Age: 59
End: 2024-06-19
Payer: COMMERCIAL

## 2024-06-19 LAB
ALBUMIN SERPL BCP-MCNC: 4.4 G/DL (ref 3.4–5)
ANION GAP SERPL CALC-SCNC: 12 MMOL/L (ref 10–20)
BUN SERPL-MCNC: 12 MG/DL (ref 6–23)
CALCIUM SERPL-MCNC: 9.7 MG/DL (ref 8.6–10.6)
CHLORIDE SERPL-SCNC: 100 MMOL/L (ref 98–107)
CO2 SERPL-SCNC: 31 MMOL/L (ref 21–32)
CREAT SERPL-MCNC: 0.67 MG/DL (ref 0.5–1.05)
EGFRCR SERPLBLD CKD-EPI 2021: >90 ML/MIN/1.73M*2
ERYTHROCYTE [DISTWIDTH] IN BLOOD BY AUTOMATED COUNT: 12.7 % (ref 11.5–14.5)
GLUCOSE BLD MANUAL STRIP-MCNC: 134 MG/DL (ref 74–99)
GLUCOSE BLD MANUAL STRIP-MCNC: 166 MG/DL (ref 74–99)
GLUCOSE BLD MANUAL STRIP-MCNC: 184 MG/DL (ref 74–99)
GLUCOSE BLD MANUAL STRIP-MCNC: 302 MG/DL (ref 74–99)
GLUCOSE SERPL-MCNC: 148 MG/DL (ref 74–99)
HCT VFR BLD AUTO: 34.9 % (ref 36–46)
HGB BLD-MCNC: 10.9 G/DL (ref 12–16)
MAGNESIUM SERPL-MCNC: 2.18 MG/DL (ref 1.6–2.4)
MCH RBC QN AUTO: 27.2 PG (ref 26–34)
MCHC RBC AUTO-ENTMCNC: 31.2 G/DL (ref 32–36)
MCV RBC AUTO: 87 FL (ref 80–100)
NRBC BLD-RTO: 0 /100 WBCS (ref 0–0)
PHOSPHATE SERPL-MCNC: 2.1 MG/DL (ref 2.5–4.9)
PLATELET # BLD AUTO: 271 X10*3/UL (ref 150–450)
POTASSIUM SERPL-SCNC: 4 MMOL/L (ref 3.5–5.3)
RBC # BLD AUTO: 4.01 X10*6/UL (ref 4–5.2)
SODIUM SERPL-SCNC: 139 MMOL/L (ref 136–145)
WBC # BLD AUTO: 13.2 X10*3/UL (ref 4.4–11.3)

## 2024-06-19 PROCEDURE — 36415 COLL VENOUS BLD VENIPUNCTURE: CPT | Performed by: STUDENT IN AN ORGANIZED HEALTH CARE EDUCATION/TRAINING PROGRAM

## 2024-06-19 PROCEDURE — 1100000001 HC PRIVATE ROOM DAILY

## 2024-06-19 PROCEDURE — 85027 COMPLETE CBC AUTOMATED: CPT | Performed by: STUDENT IN AN ORGANIZED HEALTH CARE EDUCATION/TRAINING PROGRAM

## 2024-06-19 PROCEDURE — 80069 RENAL FUNCTION PANEL: CPT | Performed by: STUDENT IN AN ORGANIZED HEALTH CARE EDUCATION/TRAINING PROGRAM

## 2024-06-19 PROCEDURE — 70553 MRI BRAIN STEM W/O & W/DYE: CPT | Performed by: RADIOLOGY

## 2024-06-19 PROCEDURE — 2500000001 HC RX 250 WO HCPCS SELF ADMINISTERED DRUGS (ALT 637 FOR MEDICARE OP): Performed by: STUDENT IN AN ORGANIZED HEALTH CARE EDUCATION/TRAINING PROGRAM

## 2024-06-19 PROCEDURE — 83735 ASSAY OF MAGNESIUM: CPT | Performed by: STUDENT IN AN ORGANIZED HEALTH CARE EDUCATION/TRAINING PROGRAM

## 2024-06-19 PROCEDURE — 2500000004 HC RX 250 GENERAL PHARMACY W/ HCPCS (ALT 636 FOR OP/ED): Performed by: STUDENT IN AN ORGANIZED HEALTH CARE EDUCATION/TRAINING PROGRAM

## 2024-06-19 PROCEDURE — 2500000002 HC RX 250 W HCPCS SELF ADMINISTERED DRUGS (ALT 637 FOR MEDICARE OP, ALT 636 FOR OP/ED): Performed by: STUDENT IN AN ORGANIZED HEALTH CARE EDUCATION/TRAINING PROGRAM

## 2024-06-19 PROCEDURE — 97161 PT EVAL LOW COMPLEX 20 MIN: CPT | Mod: GP

## 2024-06-19 PROCEDURE — 2550000001 HC RX 255 CONTRASTS: Performed by: NEUROLOGICAL SURGERY

## 2024-06-19 PROCEDURE — 70553 MRI BRAIN STEM W/O & W/DYE: CPT

## 2024-06-19 PROCEDURE — 2500000001 HC RX 250 WO HCPCS SELF ADMINISTERED DRUGS (ALT 637 FOR MEDICARE OP)

## 2024-06-19 PROCEDURE — A9575 INJ GADOTERATE MEGLUMI 0.1ML: HCPCS | Performed by: NEUROLOGICAL SURGERY

## 2024-06-19 PROCEDURE — 82947 ASSAY GLUCOSE BLOOD QUANT: CPT

## 2024-06-19 PROCEDURE — 97116 GAIT TRAINING THERAPY: CPT | Mod: GP

## 2024-06-19 RX ORDER — NAPROXEN 500 MG/1
500 TABLET ORAL
Start: 2024-06-19

## 2024-06-19 RX ORDER — DEXTROSE 50 % IN WATER (D50W) INTRAVENOUS SYRINGE
25
Status: DISCONTINUED | OUTPATIENT
Start: 2024-06-19 | End: 2024-06-20 | Stop reason: HOSPADM

## 2024-06-19 RX ORDER — DEXAMETHASONE 2 MG/1
TABLET ORAL 4 TIMES DAILY
Qty: 64 TABLET | Refills: 0 | Status: SHIPPED | OUTPATIENT
Start: 2024-06-19 | End: 2024-06-20

## 2024-06-19 RX ORDER — DEXTROSE 50 % IN WATER (D50W) INTRAVENOUS SYRINGE
12.5
Status: DISCONTINUED | OUTPATIENT
Start: 2024-06-19 | End: 2024-06-20 | Stop reason: HOSPADM

## 2024-06-19 RX ORDER — INSULIN LISPRO 100 [IU]/ML
0-5 INJECTION, SOLUTION INTRAVENOUS; SUBCUTANEOUS
Status: DISCONTINUED | OUTPATIENT
Start: 2024-06-19 | End: 2024-06-20 | Stop reason: HOSPADM

## 2024-06-19 RX ORDER — OXYCODONE HYDROCHLORIDE 5 MG/1
5 TABLET ORAL EVERY 6 HOURS PRN
Qty: 28 TABLET | Refills: 0 | Status: SHIPPED | OUTPATIENT
Start: 2024-06-19 | End: 2024-06-20

## 2024-06-19 RX ORDER — GADOTERATE MEGLUMINE 376.9 MG/ML
15 INJECTION INTRAVENOUS
Status: COMPLETED | OUTPATIENT
Start: 2024-06-19 | End: 2024-06-19

## 2024-06-19 RX ORDER — AMOXICILLIN 250 MG
2 CAPSULE ORAL DAILY
Qty: 14 TABLET | Refills: 0 | Status: SHIPPED | OUTPATIENT
Start: 2024-06-19 | End: 2024-06-20

## 2024-06-19 ASSESSMENT — PAIN DESCRIPTION - LOCATION
LOCATION: HEAD

## 2024-06-19 ASSESSMENT — COGNITIVE AND FUNCTIONAL STATUS - GENERAL
CLIMB 3 TO 5 STEPS WITH RAILING: A LITTLE
HELP NEEDED FOR BATHING: A LITTLE
MOVING FROM LYING ON BACK TO SITTING ON SIDE OF FLAT BED WITH BEDRAILS: A LITTLE
TOILETING: A LITTLE
TURNING FROM BACK TO SIDE WHILE IN FLAT BAD: A LITTLE
HELP NEEDED FOR BATHING: A LITTLE
MOBILITY SCORE: 21
MOVING TO AND FROM BED TO CHAIR: A LITTLE
WALKING IN HOSPITAL ROOM: A LITTLE
DRESSING REGULAR LOWER BODY CLOTHING: A LITTLE
CLIMB 3 TO 5 STEPS WITH RAILING: A LITTLE
CLIMB 3 TO 5 STEPS WITH RAILING: A LITTLE
MOBILITY SCORE: 21
STANDING UP FROM CHAIR USING ARMS: A LITTLE
DAILY ACTIVITIY SCORE: 21
MOVING TO AND FROM BED TO CHAIR: A LITTLE
MOVING TO AND FROM BED TO CHAIR: A LITTLE
WALKING IN HOSPITAL ROOM: A LITTLE
DRESSING REGULAR LOWER BODY CLOTHING: A LITTLE
DAILY ACTIVITIY SCORE: 21
WALKING IN HOSPITAL ROOM: A LITTLE
TOILETING: A LITTLE
MOBILITY SCORE: 18

## 2024-06-19 ASSESSMENT — PAIN SCALES - GENERAL
PAINLEVEL_OUTOF10: 5 - MODERATE PAIN
PAINLEVEL_OUTOF10: 5 - MODERATE PAIN
PAINLEVEL_OUTOF10: 7
PAINLEVEL_OUTOF10: 4
PAINLEVEL_OUTOF10: 5 - MODERATE PAIN
PAINLEVEL_OUTOF10: 2
PAINLEVEL_OUTOF10: 1
PAINLEVEL_OUTOF10: 0 - NO PAIN

## 2024-06-19 ASSESSMENT — PAIN - FUNCTIONAL ASSESSMENT
PAIN_FUNCTIONAL_ASSESSMENT: 0-10

## 2024-06-19 ASSESSMENT — ACTIVITIES OF DAILY LIVING (ADL): ADL_ASSISTANCE: INDEPENDENT

## 2024-06-19 NOTE — OP NOTE
CRANIOTOMY, FRONTAL APPROACH, WITH TISSUE EXCISION (L) Operative Note     Date: 2024  OR Location: [facility] OR    Name: Sun Zimmerman, : 1965, Age: 59 y.o., MRN: [id number], Sex: female    Diagnosis  Pre-op Diagnosis     * Cerebral meningioma (Multi) [D32.0] Post-op Diagnosis     * Cerebral meningioma (Multi) [D32.0]     Procedures  CRANIOTOMY, FRONTAL APPROACH, WITH TISSUE EXCISION  33717 - AR CRNEC TREPHINE BONE FLAP MENINGIOMA SUPRATENTOR  Left frontal craniotomy for resection of meningioma  Use of STEALTH stereotactic neuronavigation    Surgeons      * Kamini Smith - Primary    Resident/Fellow/Other Assistant:  Surgeons and Role:     * Lon Underwood MD - Resident - Assisting     * Shruti Beltran MD - Resident - Assisting    Procedure Summary  Anesthesia: General  ASA: III  Anesthesia Staff: Anesthesiologist: Gilbert Zelaya MD  Anesthesia Resident: Karen Durant MD; Avril Irving MD  Estimated Blood Loss: 750 mL  Intra-op Medications:   Administrations occurring from 1015 to 1515 on 24:   Medication Name Total Dose   lidocaine-epinephrine (Xylocaine W/EPI) 0.5 %-1:200,000 injection 20 mL   thrombin solution 10,000 Units   bacitracin ointment 2 Application   sodium chloride 0.9 % irrigation solution 1,000 mL         Intraprocedure I/O Totals       None           Specimen:   ID Type Source Tests Collected by Time   1 : LEFT BRAIN MASS Tissue BRAIN RESECTION SURGICAL PATHOLOGY EXAM Shruti Beltran MD 2024 1420        Staff:   Relief Circulator: Angela Mccall Scrub: Mercedes Tse Person: Margarita  Circulator: Gracie         Drains and/or Catheters:   [REMOVED] Urethral Catheter Double-lumen;Non-latex 16 Fr. (Removed)   Site Assessment Clean;Skin intact 24 0504   Collection Container Standard drainage bag 24 1553   Securement Method Securing device (Describe) 24 1553   Reason for Continuing Urinary Catheterization accurate hourly measurement of urine volume  in a critically ill patient that cannot be assessed by other volumes and urine collection strategies 06/18/24 2100   Output (mL) 1600 mL 06/19/24 0600       Tourniquet Times:         Implants:  Implants       Type Name Action Serial No.      Graft GRAFT MATRIX, DURAL, DURAGEN PLUS 3X3 - [id number] Implanted      Neuro Interventional Implant CROSS PIN, AXS SELF-TAP, 1.5 X 4MM - [id number] Implanted      Screw COVER, LW PROF BYRON HOLE, 14MM W/ - [id number] Implanted      Screw COVER, LW PROF BYRON HOLE, 10MM W/ - [id number] Implanted               Findings: preliminary pathology c/w meningioma    Indications: Sun Zimmerman is an 59 y.o. female who is having surgery for Cerebral meningioma (Multi) [D32.0].     The patient was seen in the preoperative area. The risks, benefits, complications, treatment options, non-operative alternatives, expected recovery and outcomes were discussed with the patient. The possibilities of reaction to medication, pulmonary aspiration, injury to surrounding structures, bleeding, recurrent infection, the need for additional procedures, failure to diagnose a condition, and creating a complication requiring transfusion or operation were discussed with the patient. The patient concurred with the proposed plan, giving informed consent.  The site of surgery was properly noted/marked if necessary per policy. The patient has been actively warmed in preoperative area. Preoperative antibiotics have been ordered and given within 1 hours of incision. Venous thrombosis prophylaxis have been ordered including bilateral sequential compression devices    Procedure Details: After informed consent was obtained and risks and benefits were discussed with the patient, the patient was brought to the operating room and placed supine on the operating table.  We performed a time-out/huddle to verify the patient's name and medical record number, as well as, the procedure and the laterality of the procedure to  be performed.  IVs and EKG leads were   placed by Anesthesia Team.  Sequential compression boots were placed on the calves.  After adequate general endotracheal anesthesia was induced, a Menard catheter was placed and the patient was given a dose of antibiotics, steroids, weight-based mannitol, and antiepileptics. Anesthesia placed an arterial line.  We then placed the patient in the supine position with the right side up, and then placed his head in the [facility] 3-point head urbano and secured it to the bed. Patient's body was adequately padded.  We registered the patient's scalp using the [employer] neuro navigation and verified an accurate registration.    We then mapped out the projection of the tumor on the patient's scalp using the STEALTH neuronavigation, then fashioned a curvilinear incision on the left frontal region.  We shaved over the proposed incision and this area was then prepped and draped in the usual sterile fashion.    We infiltrated the proposed incision with 1% lidocaine with 1:200,000 epinephrine.  We made an incision with a #10 blade and took this down through the galea.  We applied Kota clips for hemostasis.  We retracted the scalp  with cerebellar retractors. Then, we placed a number of bur holes and then freed up the dura using a hockey stick.  We elevated the bone flap using the high-speed craniotome, and made sure to elevate the bone flap lateral to the sinus on the left side, as there was no need to expose the saggital sinus. We obtained hemostasis on the dura using bipolar electrocautery. We then verified our exposure using the neuro navigation System. We opened the dura in a circumferential fashion around the tumor.      The tumor was immediately visible on the surface, and was quite firm. The tumor was able to be mobilized, as we bipolared and cut microtumor feeders surrounding the tumor. There was not a nice CSF cleft around the tumor, so we had to use careful microdissection to  resect the tumor from the pial surface. We then began removing the tumor with the bipolar and scissors. At that point, the tumor was completely free, as there did not appear to be any evidence of brain invasion. Tissue was sent for frozen and permanent section, which was consistent with meningioma.     Once we achieved gross total resection of the meningioma, hemostasis was achieved FloSeal. The cavity was irrigated until crystal clear. Then our attention was turned to closure.    We had created a dural defect given the dural attachment to the tumor. We therefore performed a duroplasty with securing the dural closure with DuraGen. Meticulous hemostasis was achieved.     The bone plate was replaced in its anatomic position and rigidly fixated with titanium plates and screws. Copious irrigation with antibiotic-containing normal saline was performed. The scalp was then closed using interrupted 2-0 Vicryls in the galeal layer  and staples to approximate the skin edges. The patient was then removed the Scherer head urbano and turned back into the supine position, and was extubated. A full head dressing was applied. The patient was then transferred to the PACU recovery in stable condition.    Complications:  None; patient tolerated the procedure well.    Disposition: PACU - hemodynamically stable.  Condition: stable         Additional Details: n/a    Attending Attestation: I was present for the entire procedure.    Kamini Smith  Phone Number: [phone]

## 2024-06-19 NOTE — BRIEF OP NOTE
Date: 2024  OR Location: [facility] OR    Name: Sun Zimmerman, : 1965, Age: 59 y.o., MRN: [id number], Sex: female    Diagnosis  Pre-op Diagnosis     * Cerebral meningioma (Multi) [D32.0] Post-op Diagnosis     * Cerebral meningioma (Multi) [D32.0]     Procedures  CRANIOTOMY, FRONTAL APPROACH, WITH TISSUE EXCISION  33352 - [place] CRNEC TREPHINE BONE FLAP MENINGIOMA SUPRATENTOR    Left craniotomy for tumor resection    Surgeons      * Kamini Smith - Primary    Resident/Fellow/Other Assistant:  Surgeons and Role:     * Lon Underwood MD - Resident - Assisting     * Shruti Beltran MD - Resident - Assisting    Procedure Summary  Anesthesia: General  ASA: III  Anesthesia Staff: Anesthesiologist: Gilbert Zelaya MD  Anesthesia Resident: Karen Durant MD; Avril Irving MD  Estimated Blood Loss: 750mL  Intra-op Medications:   Administrations occurring from 1015 to 1515 on 24:   Medication Name Total Dose   lidocaine-epinephrine (Xylocaine W/EPI) 0.5 %-1:200,000 injection 20 mL   thrombin solution 10,000 Units   bacitracin ointment 2 Application   sodium chloride 0.9 % irrigation solution 1,000 mL         Intraprocedure I/O Totals       None           Specimen:   ID Type Source Tests Collected by Time   1 : LEFT BRAIN MASS Tissue BRAIN RESECTION SURGICAL PATHOLOGY EXAM Shruti Beltran MD 2024 [id number]        Staff:   Relief Circulator: Angela Mccall Scrub: Mercedes Coxub Person: Margarita  Circulator: Gracie          Findings: preliminary pathology: meningioma    Complications:  None; patient tolerated the procedure well.     Disposition: PACU - hemodynamically stable.  Condition: stable  Specimens Collected:   ID Type Source Tests Collected by Time   1 : LEFT BRAIN MASS Tissue BRAIN RESECTION SURGICAL PATHOLOGY EXAM Shruti Beltran MD 2024 1420     Attending Attestation:     Kamini Smith  Phone Number: [phone]

## 2024-06-19 NOTE — PROGRESS NOTES
Care Transitions Progress Note:  Plan per medical team: Pod 1 crani for tumor resection; PT:OT  Team Members Present: NP: MD: SOL: WILLIAN  Dispo: Low intensity   Status: inpatient  Payor: ;Pramod  Barriers: none  Adod: 2-3 days

## 2024-06-19 NOTE — HOSPITAL COURSE
59 year old female with PMH of HLD, depression/anxiety, GERD, fibromyalgia, obesity, s/p Julio Cesar-en-y bypass, scleroderma, chronic interstitial cystitis, ZEENAT, chronic venous insufficiency. Presented with concern for seizure activity and RUE weakness from OSH. CTH with large area of edema concern for left frontal underlying lesion, MRI Brain L frontal lobe mass.   6/18 s/p L crani for tumor resection (prelim: meningioma), CTH POC  6/19 MRI Brain stable w/ POC    PT/OT recommending low level of intensity at time of discharge.  On the day of discharge, the patient was seen and evaluated by the neurosurgery team and deemed suitable for discharge to home with [facility].    There were no significant events overnight. Vitals were reviewed and within normal limits. Labs were stable at discharge. On day of discharge the patient was tolerating a diet, pain was controlled on PO pain medication, was ambulating well and voiding spontaneously. The patient was given detailed discharge instructions and were scheduled to follow up as an outpatient.

## 2024-06-19 NOTE — PROGRESS NOTES
Occupational Therapy                 Therapy Communication Note    Patient Name: Sun Zimmerman  MRN: [id number]  Today's Date: 6/19/2024     Discipline: Occupational Therapy    Missed Visit Reason: Missed Visit Reason:  (off division at MRI- will re-attempt as pt/schedule permits)    Missed Time: Attempt    06/19/24 at 9:04 AM   Purvi Joshua OT   Rehab Office: [phone]

## 2024-06-19 NOTE — PROGRESS NOTES
Physical Therapy    Physical Therapy Evaluation & Treatment    Patient Name: Sun Zimmerman  MRN: [id number]  Today's Date: 6/19/2024   Time Calculation  Start Time: 1029  Stop Time: 1057  Time Calculation (min): 28 min    Assessment/Plan   PT Assessment  PT Assessment Results: Decreased strength, Decreased endurance, Decreased mobility, Impaired balance, Decreased coordination  Rehab Prognosis: Good  End of Session Communication: Bedside nurse  Assessment Comment: Pt is a 60 yo female p/w dec strength, endurance, balance, and coordination. Pt tolerated therapy well with slight inc in headache pain after AMB. Pt was able to AMB 160ft with close supervision, modified independent with bed mobility, and close supervision for transfers. Pt scores as a low fall risk on the Tinetti Balance Test. Pt would benefit from skilled PT to address impairments and would benefit from low intensity PT upon discharge.  End of Session Patient Position: Bed, 3 rail up, Alarm off, not on at start of session   IP OR SWING BED PT PLAN  Inpatient or Swing Bed: Inpatient  PT Plan  Treatment/Interventions: Bed mobility, Transfer training, Gait training, Stair training, Balance training, Neuromuscular re-education, Strengthening, Endurance training, Therapeutic exercise, Therapeutic activity, Home exercise program  PT Plan: Ongoing PT  PT Frequency: 3 times per week  PT Discharge Recommendations: Low intensity level of continued care  PT Recommended Transfer Status: Stand by assist  PT - OK to Discharge: Yes (when medically ready)      Subjective     General Visit Information:  General  Reason for Referral: S/p L craniotomy for tumor resection (6/18)  Past Medical History Relevant to Rehab: PMH per chart: HTN, HLD, ZEENAT, scleroderma (positive anti-SCL 70), chronic interstitial cystitis, GERD, fibromyalgia,  chronic venous insufficiency, RLS  Missed Visit: No  Missed Visit Reason:  (N/A)  Family/Caregiver Present: Yes  Caregiver Feedback:  supportive daughter present throughout session  Prior to Session Communication: Bedside nurse  Patient Position Received: Bed, 3 rail up, Alarm off, not on at start of session  General Comment: +PIVx2  Home Living:  Home Living  Type of Home: House  Lives With: Spouse  Home Adaptive Equipment: Cane (does not use at baseline)  Home Layout: One level  Home Access: Stairs to enter without rails  Entrance Stairs-Rails: None  Entrance Stairs-Number of Steps: 1  Prior Level of Function:  Prior Function Per Pt/Caregiver Report  Level of [place]: Independent with ADLs and functional transfers, Independent with homemaking with ambulation  ADL Assistance: Independent  Homemaking Assistance: Independent  Ambulatory Assistance: Independent  Vocational: Full time employment (global )  Hand Dominance: Right  Prior Function Comments: +drive, -falls in last 6 mo  Precautions:  Precautions  Medical Precautions: Fall precautions     Objective   Pain:  Pain Assessment  Pain Assessment: 0-10  0-10 (Numeric) Pain Score: 5 - Moderate pain (RN aware and bringing pain meds)  Pain Location: Head (pt reporting headache)  Pain Interventions: Repositioned, Ambulation/increased activity, Rest  Response to Interventions: headache inc to 6/10 after AMB  Cognition:  Cognition  Overall Cognitive Status: Within Functional Limits  Orientation Level: Oriented X4  Following Commands: Follows all commands and directions without difficulty  Safety Judgment: Good awareness of safety precautions  Insight: Within function limits  Impulsive: Within functional limits  Processing Speed: Within funtional limits    General Assessments:  Activity Tolerance  Endurance: Tolerates 10 - 20 min exercise with multiple rests    Sensation  Light Touch: No apparent deficits    Strength  Strength Comments: WFL  Coordination  Movements are Fluid and Coordinated: No  Finger to Nose: Impaired, Dysmetria (RUE > LUE)  Rapid Alternating Movements:  Dysdiadokinesia, Impaired (pt unable to alternate both UE at the same time. Right hand delayed)  Heel to Sanchez: Intact  Coordination Comment: Opposition in tact on LUE, impaired on RUE    Postural Control  Postural Control: Within Functional Limits    Static Sitting Balance  Static Sitting-Balance Support: No upper extremity supported, Feet supported  Static Sitting-Level of Assistance: Independent  Dynamic Sitting Balance  Dynamic Sitting-Balance Support: No upper extremity supported, Feet supported  Dynamic Sitting-Balance: Forward lean  Dynamic Sitting-Comments: Independent    Static Standing Balance  Static Standing-Balance Support: No upper extremity supported  Static Standing-Level of Assistance: Close supervision  Dynamic Standing Balance  Dynamic Standing-Balance Support: No upper extremity supported  Dynamic Standing-Balance: Turning  Dynamic Standing-Comments: Close supervision  Functional Assessments:  Bed Mobility  Bed Mobility: Yes  Bed Mobility 1  Bed Mobility 1: Supine to sitting  Level of Assistance 1: Modified independent  Bed Mobility Comments 1: HOB elevated, utilized hand rails  Bed Mobility 2  Bed Mobility  2: Sitting to supine  Level of Assistance 2: Modified independent  Bed Mobility Comments 2: HOB elevated, utilized hand rails    Transfers  Transfer: Yes  Transfer 1  Transfer From 1: Bed to  Transfer to 1: Stand  Technique 1: Sit to stand  Transfer Device 1:  (none)  Transfer Level of Assistance 1: Close supervision  Transfers 2  Transfer From 2: Stand to  Transfer to 2: Bed  Technique 2: Stand to sit  Transfer Device 2:  (none)  Transfer Level of Assistance 2: Close supervision  Extremity/Trunk Assessments:  RUE Strength  R Elbow Flexion: 5/5  R Elbow Extension: 5/5  LUE Strength  L Elbow Flexion: 4+/5  L Elbow Extension: 4+/5  Strength RLE  R Hip Flexion: 4+/5  R Knee Flexion: 5/5  R Knee Extension: 5/5  R Ankle Dorsiflexion: 5/5  R Ankle Plantar Flexion: 5/5  Strength LLE  L Hip Flexion:  4/5  L Knee Flexion: 5/5  L Knee Extension: 5/5  L Ankle Dorsiflexion: 4+/5  L Ankle Plantar Flexion: 4+/5  Treatments:  Ambulation/Gait Training  Ambulation/Gait Training Performed: Yes  Ambulation/Gait Training 1  Surface 1: Level tile  Device 1: No device  Assistance 1: Close supervision  Quality of Gait 1: Decreased step length, Wide base of support (dec rani. daughter reports walking looks as it did in PLOF)  Comments/Distance (ft) 1: 160 (80x2)  Outcome Measures:  [facility] Basic Mobility  Turning from your back to your side while in a flat bed without using bedrails: A little  Moving from lying on your back to sitting on the side of a flat bed without using bedrails: A little  Moving to and from bed to chair (including a wheelchair): A little  Standing up from a chair using your arms (e.g. wheelchair or bedside chair): A little  To walk in hospital room: A little  Climbing 3-5 steps with railing: A little  Basic Mobility - Total Score: 18    Tinetti  Sitting Balance: Steady, safe  Arises: Able, uses arms to help  Attempts to Arise: Able to arise, one attempt  Immediate Standing Balance (First 5 Seconds): Steady without walker or other support  Standing Balance: Narrow stance without support  Nudged: Steady without walker or other support  Eyes Closed: Steady  Turned 360 Degrees: Steadiness: Steady  Turned 360 Degrees: Continuity of Steps: Continuous  Sitting Down: Safe, smooth motion  Balance Score: 15  Initiation of Gait: No hesitancy  Step Height: R Swing Foot: Right foot complete clears floor  Step Length: R Swing Foot: Passes left stance foot  Step Height: L Swing Foot: Left foot complete clears floor  Step Length: L Swing Foot: Passes right stance foot  Step Symmetry: Right and left step appear equal  Step Continuity: Steps appear continuous  Path: Straight without walking aid  Trunk: No sway, no flexion, no use of arms, no walking aid  Walking Time: Heels almost touching while walking  Gait Score:  12  Total Score: 27    Encounter Problems       Encounter Problems (Active)       PT Problem       Pt will complete bed mobility independently for safe discharge home       Start:  06/19/24    Expected End:  07/03/24            Pt will amb >250ft independently and with no device in prep for safe discharge home       Start:  06/19/24    Expected End:  07/03/24            Pt will a/descend 1 step independently with no rail in prep for safe home entry        Start:  06/19/24    Expected End:  07/03/24            Pt will transfer sit to stand with independently in prep for out of bed mobility        Start:  06/19/24    Expected End:  07/03/24                         Education Documentation  Body Mechanics, taught by DALIA Rogers at 6/19/2024 11:31 AM.  Learner: Patient  Readiness: Acceptance  Method: Explanation  Response: Verbalizes Understanding    Mobility Training, taught by DALIA Rogers at 6/19/2024 11:31 AM.  Learner: Patient  Readiness: Acceptance  Method: Explanation  Response: Verbalizes Understanding    Education Comments  No comments found.

## 2024-06-19 NOTE — PROGRESS NOTES
"Sun Zimmerman is a 59 y.o. female on day 1 of admission presenting with Cerebral meningioma (Multi).    Subjective   No acute events overnight       Objective     Physical Exam  Aox3  LUE 5/5  RUE prox 3/5 distally 4/5   RLE 4/5  LLE 5/5  Incision covered with dressing    Last Recorded Vitals  Blood pressure 113/79, pulse 76, temperature 35.9 °C (96.6 °F), temperature source Temporal, resp. rate (!) 6, height 1.65 m (5' 4.96\"), weight 126 kg (276 lb 14.4 oz), SpO2 94%.  Intake/Output last 3 Shifts:  I/O last 3 completed shifts:  In: 4200 (33.4 mL/kg) [I.V.:4100 (32.6 mL/kg); IV Piggyback:100]  Out: 3745 (29.8 mL/kg) [Urine:3025 (0.7 mL/kg/hr); Blood:720]  Weight: 125.6 kg     Relevant Results                This patient has a urinary catheter   Reason for the urinary catheter remaining today? Urine catheter unnecessary, will be removed today               Assessment/Plan   Principal Problem:    Cerebral meningioma (Multi)  Active Problems:    Brain mass    Sun Zimmerman is a 59 yr old F with h/o HLD, depression/anxiety, GERD, fibromyalgia, obesity, p/w concern for sz activity, RUE weakness, found to have left frontal meningioma on MRI, 6/18 s/p L crani for tumor resection     Plan:  Floor   Dex wean   DC richey, void trial  Continue keppra   MRI this AM  SCDs, SQH POD 2  PTOT           Shruti Beltran MD      "

## 2024-06-20 ENCOUNTER — PHARMACY VISIT (OUTPATIENT)
Dept: PHARMACY | Facility: CLINIC | Age: 59
End: 2024-06-20
Payer: MEDICARE

## 2024-06-20 ENCOUNTER — HOME HEALTH ADMISSION (OUTPATIENT)
Dept: HOME HEALTH SERVICES | Facility: HOME HEALTH | Age: 59
End: 2024-06-20
Payer: COMMERCIAL

## 2024-06-20 ENCOUNTER — DOCUMENTATION (OUTPATIENT)
Dept: HOME HEALTH SERVICES | Facility: HOME HEALTH | Age: 59
End: 2024-06-20

## 2024-06-20 VITALS
BODY MASS INDEX: 46.13 KG/M2 | TEMPERATURE: 96.1 F | HEIGHT: 65 IN | HEART RATE: 69 BPM | OXYGEN SATURATION: 93 % | SYSTOLIC BLOOD PRESSURE: 136 MMHG | WEIGHT: 276.9 LBS | RESPIRATION RATE: 18 BRPM | DIASTOLIC BLOOD PRESSURE: 81 MMHG

## 2024-06-20 DIAGNOSIS — D32.0 CEREBRAL MENINGIOMA (MULTI): Primary | ICD-10-CM

## 2024-06-20 LAB
ALBUMIN SERPL BCP-MCNC: 4.1 G/DL (ref 3.4–5)
ANION GAP SERPL CALC-SCNC: 12 MMOL/L (ref 10–20)
BUN SERPL-MCNC: 20 MG/DL (ref 6–23)
CALCIUM SERPL-MCNC: 9.3 MG/DL (ref 8.6–10.6)
CHLORIDE SERPL-SCNC: 102 MMOL/L (ref 98–107)
CO2 SERPL-SCNC: 28 MMOL/L (ref 21–32)
CREAT SERPL-MCNC: 0.68 MG/DL (ref 0.5–1.05)
EGFRCR SERPLBLD CKD-EPI 2021: >90 ML/MIN/1.73M*2
ERYTHROCYTE [DISTWIDTH] IN BLOOD BY AUTOMATED COUNT: 13.1 % (ref 11.5–14.5)
GLUCOSE BLD MANUAL STRIP-MCNC: 125 MG/DL (ref 74–99)
GLUCOSE SERPL-MCNC: 129 MG/DL (ref 74–99)
HCT VFR BLD AUTO: 32.1 % (ref 36–46)
HGB BLD-MCNC: 10 G/DL (ref 12–16)
LABORATORY COMMENT REPORT: NORMAL
Lab: NORMAL
MAGNESIUM SERPL-MCNC: 2.17 MG/DL (ref 1.6–2.4)
MCH RBC QN AUTO: 27 PG (ref 26–34)
MCHC RBC AUTO-ENTMCNC: 31.2 G/DL (ref 32–36)
MCV RBC AUTO: 87 FL (ref 80–100)
NRBC BLD-RTO: 0 /100 WBCS (ref 0–0)
PATH REPORT.FINAL DX SPEC: NORMAL
PATH REPORT.GROSS SPEC: NORMAL
PATH REPORT.RELEVANT HX SPEC: NORMAL
PATH REPORT.TOTAL CANCER: NORMAL
PHOSPHATE SERPL-MCNC: 3.6 MG/DL (ref 2.5–4.9)
PLATELET # BLD AUTO: 255 X10*3/UL (ref 150–450)
POTASSIUM SERPL-SCNC: 4.1 MMOL/L (ref 3.5–5.3)
RBC # BLD AUTO: 3.7 X10*6/UL (ref 4–5.2)
SODIUM SERPL-SCNC: 138 MMOL/L (ref 136–145)
WBC # BLD AUTO: 12.6 X10*3/UL (ref 4.4–11.3)

## 2024-06-20 PROCEDURE — 85027 COMPLETE CBC AUTOMATED: CPT | Performed by: STUDENT IN AN ORGANIZED HEALTH CARE EDUCATION/TRAINING PROGRAM

## 2024-06-20 PROCEDURE — 2500000005 HC RX 250 GENERAL PHARMACY W/O HCPCS: Performed by: STUDENT IN AN ORGANIZED HEALTH CARE EDUCATION/TRAINING PROGRAM

## 2024-06-20 PROCEDURE — 97165 OT EVAL LOW COMPLEX 30 MIN: CPT | Mod: GO

## 2024-06-20 PROCEDURE — 36415 COLL VENOUS BLD VENIPUNCTURE: CPT | Performed by: STUDENT IN AN ORGANIZED HEALTH CARE EDUCATION/TRAINING PROGRAM

## 2024-06-20 PROCEDURE — RXMED WILLOW AMBULATORY MEDICATION CHARGE

## 2024-06-20 PROCEDURE — 82947 ASSAY GLUCOSE BLOOD QUANT: CPT

## 2024-06-20 PROCEDURE — 2500000004 HC RX 250 GENERAL PHARMACY W/ HCPCS (ALT 636 FOR OP/ED): Performed by: STUDENT IN AN ORGANIZED HEALTH CARE EDUCATION/TRAINING PROGRAM

## 2024-06-20 PROCEDURE — 80069 RENAL FUNCTION PANEL: CPT | Performed by: STUDENT IN AN ORGANIZED HEALTH CARE EDUCATION/TRAINING PROGRAM

## 2024-06-20 PROCEDURE — 83735 ASSAY OF MAGNESIUM: CPT | Performed by: STUDENT IN AN ORGANIZED HEALTH CARE EDUCATION/TRAINING PROGRAM

## 2024-06-20 PROCEDURE — 2500000002 HC RX 250 W HCPCS SELF ADMINISTERED DRUGS (ALT 637 FOR MEDICARE OP, ALT 636 FOR OP/ED): Performed by: STUDENT IN AN ORGANIZED HEALTH CARE EDUCATION/TRAINING PROGRAM

## 2024-06-20 PROCEDURE — 2500000001 HC RX 250 WO HCPCS SELF ADMINISTERED DRUGS (ALT 637 FOR MEDICARE OP): Performed by: STUDENT IN AN ORGANIZED HEALTH CARE EDUCATION/TRAINING PROGRAM

## 2024-06-20 RX ORDER — AMOXICILLIN 250 MG
2 CAPSULE ORAL DAILY
Qty: 14 TABLET | Refills: 0 | Status: SHIPPED | OUTPATIENT
Start: 2024-06-20

## 2024-06-20 RX ORDER — HEPARIN SODIUM 5000 [USP'U]/ML
7500 INJECTION, SOLUTION INTRAVENOUS; SUBCUTANEOUS EVERY 8 HOURS SCHEDULED
Status: DISCONTINUED | OUTPATIENT
Start: 2024-06-20 | End: 2024-06-20 | Stop reason: HOSPADM

## 2024-06-20 RX ORDER — OXYCODONE HYDROCHLORIDE 5 MG/1
5 TABLET ORAL EVERY 6 HOURS PRN
Qty: 28 TABLET | Refills: 0 | Status: SHIPPED | OUTPATIENT
Start: 2024-06-20

## 2024-06-20 RX ORDER — DEXAMETHASONE 2 MG/1
TABLET ORAL 4 TIMES DAILY
Qty: 40 TABLET | Refills: 0 | Status: SHIPPED | OUTPATIENT
Start: 2024-06-20 | End: 2024-06-20

## 2024-06-20 RX ORDER — DEXAMETHASONE 2 MG/1
TABLET ORAL
Qty: 40 TABLET | Refills: 0 | Status: SHIPPED | OUTPATIENT
Start: 2024-06-20 | End: 2024-06-28

## 2024-06-20 ASSESSMENT — PAIN SCALES - GENERAL
PAINLEVEL_OUTOF10: 7
PAINLEVEL_OUTOF10: 5 - MODERATE PAIN
PAINLEVEL_OUTOF10: 0 - NO PAIN
PAINLEVEL_OUTOF10: 2
PAINLEVEL_OUTOF10: 2

## 2024-06-20 ASSESSMENT — PAIN - FUNCTIONAL ASSESSMENT
PAIN_FUNCTIONAL_ASSESSMENT: 0-10

## 2024-06-20 ASSESSMENT — COGNITIVE AND FUNCTIONAL STATUS - GENERAL
MOBILITY SCORE: 21
DRESSING REGULAR LOWER BODY CLOTHING: A LITTLE
CLIMB 3 TO 5 STEPS WITH RAILING: A LITTLE
WALKING IN HOSPITAL ROOM: A LITTLE
TOILETING: A LITTLE
DAILY ACTIVITIY SCORE: 24
DAILY ACTIVITIY SCORE: 21
MOVING TO AND FROM BED TO CHAIR: A LITTLE
HELP NEEDED FOR BATHING: A LITTLE

## 2024-06-20 ASSESSMENT — PAIN DESCRIPTION - LOCATION
LOCATION: HEAD
LOCATION: HEAD

## 2024-06-20 ASSESSMENT — ACTIVITIES OF DAILY LIVING (ADL): ADL_ASSISTANCE: INDEPENDENT

## 2024-06-20 NOTE — PROGRESS NOTES
Occupational Therapy    Evaluation    Patient Name: Sun Zimmerman  MRN: [id number]  Today's Date: 6/20/2024  Time Calculation  Start Time: 0910  Stop Time: 0930  Time Calculation (min): 20 min    Assessment  IP OT Assessment  OT Assessment: Good dynamic standing balance, independent with ADLs. Issued a sponge and theraputty to encourage R distal UE flex/ext exercises to decrease swelling of the hand.  Prognosis: Good  Barriers to Discharge: None  Evaluation/Treatment Tolerance: Patient tolerated treatment well  Medical Staff Made Aware: Yes  End of Session Communication: Bedside nurse  End of Session Patient Position: Bed, 3 rail up, Alarm off, not on at start of session  Plan:  No Skilled OT: No acute OT goals identified  OT Frequency: OT eval only  OT Discharge Recommendations: No further acute OT, No OT needed after discharge  OT - OK to Discharge: Yes    Subjective   Current Problem:  1. Brain mass  Referral to Home Health    dexAMETHasone (Decadron) 2 mg tablet    DISCONTINUED: dexAMETHasone (Decadron) 2 mg tablet    DISCONTINUED: dexAMETHasone (Decadron) 2 mg tablet    CANCELED: Referral to Home Health      2. Cerebral meningioma (Multi)  Surgical Pathology Exam    Surgical Pathology Exam      3. Unilateral primary osteoarthritis, left knee  naproxen (Naprosyn) 500 mg tablet      4. Impaired functional mobility and endurance  Referral to Home Health    CANCELED: Referral to Home Health      5. Post-op pain  oxyCODONE (Roxicodone) 5 mg immediate release tablet    DISCONTINUED: oxyCODONE (Roxicodone) 5 mg immediate release tablet      6. Constipation due to pain medication  sennosides-docusate sodium (Loretta-Colace) 8.6-50 mg tablet    DISCONTINUED: sennosides-docusate sodium (Loretta-Colace) 8.6-50 mg tablet        General:  Reason for Referral: S/p L craniotomy for tumor resection (6/18)  Past Medical History Relevant to Rehab: PMH per chart: HTN, HLD, ZEENAT, scleroderma (positive anti-SCL 70), chronic  interstitial cystitis, GERD, fibromyalgia,  chronic venous insufficiency, RLS  Prior to Session Communication: Bedside nurse  Patient Position Received: Bed, 3 rail up, Alarm off, not on at start of session  Family/Caregiver Present: Yes  Caregiver Feedback: daughter present, very supportive   Precautions:  Medical Precautions: Fall precautions    Pain:  Pain Assessment  Pain Assessment: 0-10  0-10 (Numeric) Pain Score: 0 - No pain      Objective   Cognition:  Overall Cognitive Status: Within Functional Limits  Orientation Level: Oriented X4  Following Commands: Follows all commands and directions without difficulty    Home Living:  Type of Home: House  Lives With: Spouse  Home Adaptive Equipment: Cane  Home Layout: One level  Home Access: Stairs to enter without rails  Entrance Stairs-Rails: None  Bathroom Shower/Tub: Walk-in shower  Bathroom Equipment:  (shower bench)   Prior Function:  Level of [place]: Independent with ADLs and functional transfers, Independent with homemaking with ambulation  ADL Assistance: Independent  Homemaking Assistance: Independent  Ambulatory Assistance: Independent  Vocational: Full time employment  Hand Dominance: Right  Prior Function Comments: +drive, -falls in last 6 mo    ADL:  Eating Assistance: Independent  Grooming Assistance: Independent  UE Dressing Assistance: Independent  Toileting Assistance with Device: Independent  Activity Tolerance:  Endurance: Decreased tolerance for upright activites  Balance:  Dynamic Sitting Balance  Dynamic Sitting-Balance Support: No upper extremity supported  Dynamic Sitting-Balance:  (Independent)  Dynamic Standing Balance  Dynamic Standing-Balance Support: No upper extremity supported  Dynamic Standing-Balance:  (SBA)  Dynamic Standing-Comments: pt completed short distance ambulation in the room with good dynamic standing balance, able to reach for objects without any difficulties or LOB.  Static Sitting Balance  Static Sitting-Balance  Support: No upper extremity supported  Static Sitting-Level of Assistance: Independent  Static Standing Balance  Static Standing-Balance Support: No upper extremity supported  Static Standing-Level of Assistance: Distant supervision  Bed Mobility/Transfers: Bed Mobility  Bed Mobility: Yes  Bed Mobility 1  Bed Mobility 1: Supine to sitting  Level of Assistance 1: Modified independent  Bed Mobility 2  Bed Mobility  2: Sitting to supine  Level of Assistance 2: Modified independent   and Transfers  Transfer: Yes  Transfer 1  Transfer From 1: Bed to  Transfer to 1: Stand  Technique 1: Sit to stand  Transfer Level of Assistance 1: Close supervision  Transfers 2  Transfer From 2: Stand to  Transfer to 2: Bed  Technique 2: Stand to sit  Transfer Level of Assistance 2: Close supervision    Vision: Vision - Basic Assessment  Current Vision: No visual deficits   and Vision - Complex Assessment  Ocular Range of Motion: Within Functional Limits  Sensation:  Light Touch: No apparent deficits    Coordination:  Movements are Fluid and Coordinated: Yes   Hand Function:  Hand Function  Gross Grasp: Functional  Coordination: Functional  Extremities: RUE   RUE : Within Functional Limits  RUE Strength  R Elbow Flexion: 4/5, LUE   LUE: Within Functional Limits  LUE Strength  L Elbow Flexion: 4/5    Outcome Measures: [facility] Daily Activity  Putting on and taking off regular lower body clothing: None  Bathing (including washing, rinsing, drying): None  Putting on and taking off regular upper body clothing: None  Toileting, which includes using toilet, bedpan or urinal: None  Taking care of personal grooming such as brushing teeth: None  Eating Meals: None  Daily Activity - Total Score: 24         ,     OT Adult Other Outcome Measures  4AT: negative    Education Documentation  Body Mechanics, taught by Debbie Obrien OT at 6/20/2024  2:05 PM.  Learner: Patient  Readiness: Acceptance  Method: Explanation  Response: Verbalizes  Understanding    ADL Training, taught by Debbie Obrien OT at 6/20/2024  2:05 PM.  Learner: Patient  Readiness: Acceptance  Method: Explanation  Response: Verbalizes Understanding    Education Comments  No comments found.        06/20/24 at 2:06 PM   Debbie Obrien OTR/OTD  Rehab Office: [phone]

## 2024-06-20 NOTE — HH CARE COORDINATION
Home Care received a Referral for Nursing, Physical Therapy, and Occupational Therapy. We have processed the referral for a Start of Care on 6/22/24.     If you have any questions or concerns, please feel free to contact us at [phone]. Follow the prompts, enter your five digit zip code, and you will be directed to your care team on [facility] 1.

## 2024-06-20 NOTE — DISCHARGE SUMMARY
Discharge Diagnosis  Cerebral meningioma (Multi)    Issues Requiring Follow-Up  Staples- to be removed at follow up appointment    Test Results Pending At Discharge  Pending Labs       Order Current Status    Surgical Pathology Exam In process            Hospital Course  59 year old female with PMH of HLD, depression/anxiety, GERD, fibromyalgia, obesity, s/p Julio Cesar-en-y bypass, scleroderma, chronic interstitial cystitis, ZEENAT, chronic venous insufficiency. Presented with concern for seizure activity and RUE weakness from OSH. CTH with large area of edema concern for left frontal underlying lesion, MRI Brain L frontal lobe mass.   6/18 s/p L crani for tumor resection (prelim: meningioma), CTH POC  6/19 MRI Brain stable w/ POC    PT/OT recommending low level of intensity at time of discharge.  On the day of discharge, the patient was seen and evaluated by the neurosurgery team and deemed suitable for discharge to home with [facility].    There were no significant events overnight. Vitals were reviewed and within normal limits. Labs were stable at discharge. On day of discharge the patient was tolerating a diet, pain was controlled on PO pain medication, was ambulating well and voiding spontaneously. The patient was given detailed discharge instructions and were scheduled to follow up as an outpatient.           Pertinent Physical Exam At Time of Discharge  Physical Exam  HENT:      Head:      Comments: Incision C/D/I  Eyes:      Pupils: Pupils are equal, round, and reactive to light.   Cardiovascular:      Pulses: Normal pulses.   Pulmonary:      Effort: Pulmonary effort is normal.   Abdominal:      Palpations: Abdomen is soft.   Neurological:      Mental Status: She is alert and oriented to person, place, and time.      Comments: A&Ox3  RUE prox 4-, distal 4  RLE 4  LLE 5         Home Medications     Medication List      START taking these medications     dexAMETHasone 2 mg tablet; Commonly known as: Decadron; Take 2 tablets    (4 mg) by mouth 4 times a day for 2 days, THEN 1.5 tablets (3 mg) 4 times   a day for 2 days, THEN 1 tablet (2 mg) 4 times a day for 2 days, THEN 0.5   tablets (1 mg) 4 times a day for 2 days.; Start taking on: June 20, 2024   oxyCODONE 5 mg immediate release tablet; Commonly known as: Roxicodone;   Take 1 tablet (5 mg) by mouth every 6 hours if needed for severe pain (7 -   10) or moderate pain (4 - 6).   sennosides-docusate sodium 8.6-50 mg tablet; Commonly known as:   Loretta-Colace; Take 2 tablets by mouth once daily. For constipation while   taking pain medication Oxycodone     CHANGE how you take these medications     naproxen 500 mg tablet; Commonly known as: Naprosyn; Take 1 tablet (500   mg) by mouth 2 times a day after meals. Do not restart until follow up   with Neurosurgery; What changed: additional instructions     CONTINUE taking these medications     acetaminophen 500 mg tablet; Commonly known as: Tylenol   DULoxetine 60 mg DR capsule; Commonly known as: Cymbalta; Take 1 capsule   (60 mg) by mouth once daily.   gabapentin 600 mg tablet; Commonly known as: Neurontin   hydrOXYzine HCL 25 mg tablet; Commonly known as: Atarax   levETIRAcetam 1,000 mg tablet; Commonly known as: Keppra; Take 1 tablet   (1,000 mg) by mouth 2 times a day.   omeprazole 40 mg DR capsule; Commonly known as: PriLOSEC; TAKE 1 CAPSULE   BY MOUTH EVERY DAY----MUST MAKE APPT   propranolol LA 80 mg 24 hr capsule; Commonly known as: Inderal LA; Take   1 capsule (80 mg) by mouth once daily.       Outpatient Follow-Up  Future Appointments   Date Time Provider Department Center   7/5/2024 11:00 AM Kamini Smith MD [id number]   7/8/2024  7:30 AM [facility] 1 [facility] [place]         Total face to face time spent with patient/family of 30 minutes, with >50% of the time spent discussing plan of care/management, counseling/educating on disease processes, explaining results of diagnostic testing.    Ella Demarco, APRN-CNP

## 2024-06-20 NOTE — CARE PLAN
The patient's goals for the shift include  pain mgt    The clinical goals for the shift include pt will be free of falls      Problem: Skin  Goal: Decreased wound size/increased tissue granulation at next dressing change  Outcome: Progressing  Goal: Participates in plan/prevention/treatment measures  Outcome: Progressing  Goal: Prevent/manage excess moisture  Outcome: Progressing  Goal: Prevent/minimize sheer/friction injuries  Outcome: Progressing  Goal: Promote/optimize nutrition  Outcome: Progressing  Goal: Promote skin healing  Outcome: Progressing     Problem: Fall/Injury  Goal: Not fall by end of shift  Outcome: Progressing  Goal: Be free from injury by end of the shift  Outcome: Progressing  Goal: Verbalize understanding of personal risk factors for fall in the hospital  Outcome: Progressing  Goal: Verbalize understanding of risk factor reduction measures to prevent injury from fall in the home  Outcome: Progressing  Goal: Use assistive devices by end of the shift  Outcome: Progressing  Goal: Pace activities to prevent fatigue by end of the shift  Outcome: Progressing     Problem: Pain  Goal: Takes deep breaths with improved pain control throughout the shift  Outcome: Progressing  Goal: Turns in bed with improved pain control throughout the shift  Outcome: Progressing  Goal: Walks with improved pain control throughout the shift  Outcome: Progressing  Goal: Performs ADL's with improved pain control throughout shift  Outcome: Progressing  Goal: Participates in PT with improved pain control throughout the shift  Outcome: Progressing  Goal: Free from opioid side effects throughout the shift  Outcome: Progressing  Goal: Free from acute confusion related to pain meds throughout the shift  Outcome: Progressing     Problem: Respiratory  Goal: Clear secretions with interventions this shift  Outcome: Progressing  Goal: Minimize anxiety/maximize coping throughout shift  Outcome: Progressing  Goal: Minimal/no exertional  discomfort or dyspnea this shift  Outcome: Progressing  Goal: No signs of respiratory distress (eg. Use of accessory muscles. Peds grunting)  Outcome: Progressing  Goal: Patent airway maintained this shift  Outcome: Progressing  Goal: Verbalize decreased shortness of breath this shift  Outcome: Progressing  Goal: Wean oxygen to maintain O2 saturation per order/standard this shift  Outcome: Progressing  Goal: Increase self care and/or family involvement in next 24 hours  Outcome: Progressing

## 2024-06-20 NOTE — PROGRESS NOTES
"Sun Zimmerman is a 59 y.o. female on day 2 of admission presenting with Cerebral meningioma (Multi).    Subjective   No acute events overnight       Objective     Physical Exam  Aox3  LUE 5/5  RUE prox 4/5 distally 4/5   RLE 4/5  LLE 5/5  Incision c/d/i    Last Recorded Vitals  Blood pressure 121/67, pulse 80, temperature 36.8 °C (98.2 °F), resp. rate 18, height 1.65 m (5' 4.96\"), weight 126 kg (276 lb 14.4 oz), SpO2 95%.  Intake/Output last 3 Shifts:  I/O last 3 completed shifts:  In: 4200 (33.4 mL/kg) [I.V.:4100 (32.6 mL/kg); IV Piggyback:100]  Out: 5645 (44.9 mL/kg) [Urine:4925 (1.1 mL/kg/hr); Blood:720]  Weight: 125.6 kg     Relevant Results                This patient has a urinary catheter   Reason for the urinary catheter remaining today? Urine catheter unnecessary, will be removed today               Assessment/Plan   Principal Problem:    Cerebral meningioma (Multi)  Active Problems:    Brain mass    Sun Zimmerman is a 59 yr old F with h/o HLD, depression/anxiety, GERD, fibromyalgia, obesity, p/w concern for sz activity, RUE weakness, found to have left frontal meningioma on MRI, 6/18 s/p L crani for tumor resection     6/19 MRI GTR    Plan:  Floor   Dex wean   Continue keppra   SCDs, SQH POD 2  PTOT- home care            Shruti Beltran MD      "

## 2024-06-25 ENCOUNTER — PATIENT OUTREACH (OUTPATIENT)
Dept: CARE COORDINATION | Facility: CLINIC | Age: 59
End: 2024-06-25

## 2024-06-25 ENCOUNTER — HOME CARE VISIT (OUTPATIENT)
Dept: HOME HEALTH SERVICES | Facility: HOME HEALTH | Age: 59
End: 2024-06-25
Payer: COMMERCIAL

## 2024-06-25 VITALS
WEIGHT: 272 LBS | TEMPERATURE: 97.9 F | HEIGHT: 65 IN | HEART RATE: 76 BPM | BODY MASS INDEX: 45.32 KG/M2 | DIASTOLIC BLOOD PRESSURE: 82 MMHG | OXYGEN SATURATION: 98 % | RESPIRATION RATE: 16 BRPM | SYSTOLIC BLOOD PRESSURE: 124 MMHG

## 2024-06-25 PROCEDURE — G0299 HHS/HOSPICE OF RN EA 15 MIN: HCPCS

## 2024-06-25 SDOH — ECONOMIC STABILITY: FOOD INSECURITY: MEALS PER DAY: 2

## 2024-06-25 ASSESSMENT — ENCOUNTER SYMPTOMS
APPETITE LEVEL: FAIR
LOSS OF SENSATION IN FEET: 0
FATIGUES EASILY: 1
OCCASIONAL FEELINGS OF UNSTEADINESS: 0
DENIES PAIN: 1
LAST BOWEL MOVEMENT: 67016
CHANGE IN APPETITE: UNCHANGED
DEPRESSION: 0
PERSON REPORTING PAIN: PATIENT
STOOL FREQUENCY: MORE THAN TWICE DAILY

## 2024-06-25 ASSESSMENT — ACTIVITIES OF DAILY LIVING (ADL)
LAUNDRY ASSESSED: 1
TRANSPORTATION: DEPENDENT
BATHING_CURRENT_FUNCTION: STAND BY ASSIST
LIGHT HOUSEKEEPING: DEPENDENT
DRESSING_UB_CURRENT_FUNCTION: STAND BY ASSIST
ORAL_CARE_CURRENT_FUNCTION: INDEPENDENT
TOILETING: 1
GROOMING ASSESSED: 1
OASIS_M1830: 03
ENTERING_EXITING_HOME: STAND BY ASSIST
PHYSICAL TRANSFERS ASSESSED: 1
HOUSEKEEPING ASSESSED: 1
SHOPPING ASSESSED: 1
AMBULATION ASSISTANCE: 1
BATHING ASSESSED: 1
USING THE TELPHONE: INDEPENDENT
TELEPHONE USE ASSESSED: 1
AMBULATION ASSISTANCE: STAND BY ASSIST
ORAL_CARE_ASSESSED: 1
FEEDING: STAND BY ASSIST
FEEDING ASSESSED: 1
CURRENT_FUNCTION: STAND BY ASSIST
TOILETING: STAND BY ASSIST
TRANSPORTATION ASSESSED: 1
LAUNDRY: DEPENDENT
GROOMING_CURRENT_FUNCTION: STAND BY ASSIST
SHOPPING: DEPENDENT
DRESSING_LB_CURRENT_FUNCTION: STAND BY ASSIST

## 2024-06-25 ASSESSMENT — PAIN SCALES - PAIN ASSESSMENT IN ADVANCED DEMENTIA (PAINAD)
BODYLANGUAGE: 0 - RELAXED.
BODYLANGUAGE: 0
FACIALEXPRESSION: 0
FACIALEXPRESSION: 0 - SMILING OR INEXPRESSIVE.
BREATHING: 0
TOTALSCORE: 0
NEGVOCALIZATION: 0
CONSOLABILITY: 0 - NO NEED TO CONSOLE.
NEGVOCALIZATION: 0 - NONE.
CONSOLABILITY: 0

## 2024-06-25 ASSESSMENT — LIFESTYLE VARIABLES: SMOKING_STATUS: 0

## 2024-06-25 NOTE — PROGRESS NOTES
Outreach call to patient to support a smooth transition of care from recent admission.  Spoke with patient, reviewed discharge medications, discharge instructions, assessed social needs, and provided education on importance of follow-up appointment with provider.  Will continue to monitor through transition period.    Patient requested no Conversa due to HC involvement at present time.

## 2024-06-26 ENCOUNTER — APPOINTMENT (OUTPATIENT)
Dept: HEMATOLOGY/ONCOLOGY | Facility: HOSPITAL | Age: 59
End: 2024-06-26
Payer: COMMERCIAL

## 2024-06-26 ENCOUNTER — HOME CARE VISIT (OUTPATIENT)
Dept: HOME HEALTH SERVICES | Facility: HOME HEALTH | Age: 59
End: 2024-06-26
Payer: COMMERCIAL

## 2024-06-26 VITALS — TEMPERATURE: 97.3 F | SYSTOLIC BLOOD PRESSURE: 140 MMHG | HEART RATE: 70 BPM | DIASTOLIC BLOOD PRESSURE: 100 MMHG

## 2024-06-26 PROCEDURE — G0151 HHCP-SERV OF PT,EA 15 MIN: HCPCS

## 2024-06-26 SDOH — HEALTH STABILITY: PHYSICAL HEALTH: EXERCISE TYPE: LE THER EX

## 2024-06-26 ASSESSMENT — ENCOUNTER SYMPTOMS
PERSON REPORTING PAIN: PATIENT
PAIN LOCATION: HEAD
PAIN LOCATION - PAIN SEVERITY: 1/10
PAIN: 1

## 2024-06-26 ASSESSMENT — ACTIVITIES OF DAILY LIVING (ADL)
AMBULATION ASSISTANCE ON FLAT SURFACES: 1
AMBULATION ASSISTANCE: 1
AMBULATION_DISTANCE/DURATION_TOLERATED: 150 FT
PHYSICAL TRANSFERS ASSESSED: 1

## 2024-06-27 ENCOUNTER — HOME CARE VISIT (OUTPATIENT)
Dept: HOME HEALTH SERVICES | Facility: HOME HEALTH | Age: 59
End: 2024-06-27
Payer: COMMERCIAL

## 2024-06-27 VITALS
HEART RATE: 64 BPM | DIASTOLIC BLOOD PRESSURE: 72 MMHG | OXYGEN SATURATION: 99 % | RESPIRATION RATE: 18 BRPM | TEMPERATURE: 96.9 F | SYSTOLIC BLOOD PRESSURE: 120 MMHG

## 2024-06-27 PROCEDURE — G0152 HHCP-SERV OF OT,EA 15 MIN: HCPCS

## 2024-06-27 ASSESSMENT — PAIN SCALES - PAIN ASSESSMENT IN ADVANCED DEMENTIA (PAINAD)
NEGVOCALIZATION: 0
NEGVOCALIZATION: 0 - NONE.
BODYLANGUAGE: 0 - RELAXED.
FACIALEXPRESSION: 0
BODYLANGUAGE: 0
CONSOLABILITY: 0
BREATHING: 0
FACIALEXPRESSION: 0 - SMILING OR INEXPRESSIVE.
TOTALSCORE: 0
CONSOLABILITY: 0 - NO NEED TO CONSOLE.

## 2024-06-27 ASSESSMENT — ENCOUNTER SYMPTOMS
DENIES PAIN: 1
PERSON REPORTING PAIN: PATIENT

## 2024-06-27 ASSESSMENT — ACTIVITIES OF DAILY LIVING (ADL)
BATHING ASSESSED: 1
BATHING_CURRENT_FUNCTION: INDEPENDENT
TOILETING: INDEPENDENT
DRESSING_LB_CURRENT_FUNCTION: INDEPENDENT
TOILETING: 1

## 2024-07-02 ENCOUNTER — HOME CARE VISIT (OUTPATIENT)
Dept: HOME HEALTH SERVICES | Facility: HOME HEALTH | Age: 59
End: 2024-07-02
Payer: COMMERCIAL

## 2024-07-02 NOTE — TUMOR BOARD NOTE
CNS Tumor Board Recommendations       Patient was presented by Dr. Kamini Smith at our CNS Tumor Board on 07/02/24 which included representatives from Radiation oncology, Surgical oncology, Neuro-oncology, Pathology, Radiology, Research, Neurosurgery, Social Work (Neurosurgery).     Current patient presents with history of the following treatment history: PMH HLD, GERD, fibromyalgia, ZEENAT, s/p Julio Cesar-en-y bypass, scleroderma, chronic interstitial cystitis, chronic venous insufficiency, depression, anxiety, who presented with concern for seizure activity found to have L frontal lobe mass on MRI. S/p L crani for tumor resection 06/18/24. CTH with POC. MRI for GTR. Final pathology for Microcystic meningioma WHO Grade 1.     The CNS Tumor Board tumor board considered available treatment options and made the following recommendations: Continued Surveillance Imaging     Clinical Trial Status: N/A     National site-specific guidelines were discussed with respect to the case.

## 2024-07-04 NOTE — PROGRESS NOTES
[facility]  Neurosurgery    Subjective     Sun Zimmerman is a right handed  59 y.o. year old female presenting for follow-up .     PMH significant for HLD, ZEENAT, GERD, fibromyalgia, obesity s/p Julio Cesar-en-y bypass, scleroderma, chronic interstitial cystitis, chronic venous insufficiency, anxiety, and depression. She presented for evaluation for possible seizure like activity with RUE weakness. Workup was significant for large area of edema concerning for left frontal lesion on CTH. MRI with L frontal lobe mass. Patient is now s/p L craniotomy for tumor resection. Final surgical pathology for microcystic meningioma WHO grade 1. MRI for GTR. Patient presents to clinic for 2 week POV.     She comes to clinic accompanied by her immediate family members. Patient is doing well post op. Incision healing well. There is some mild erythema along the areas of the incision where her CPAP straps lie when she sleeps at night. Otherwise, patient denies fevers, headaches, nausea, vomiting, speech difficulty, seizures, double/blurry vision, sensory loss, weakness, incontinence, pain.      Review of Systems   All other systems reviewed and are negative.      Treatment Synopsis:   Brain Tumor: Meningioma  Location: frontal lobe  Age at diagnosis: 59  Prior history of Radiation: no    Objective     Performance and Vitals:  KARNOFSKY SCALE WITH ECOG EQUIVALENT:100, Fully active, able to carry on all pre-disease performed without restriction (ECOG equivalent 0)    Vitals:    07/05/24 1057   BP: 146/80   Pulse: 65   Resp: 19   Temp: 36.5 °C (97.7 °F)   SpO2: 97%         Neurological Exam  Mental Status   Oriented to person, place, time and situation. Recent and remote memory are intact. Speech is normal. Language is fluent with no aphasia. Attention and concentration are normal.    Cranial Nerves  CN II: Visual acuity is normal. Visual fields full to confrontation.  CN III, IV, VI: Extraocular movements  intact bilaterally. Normal lids and orbits bilaterally. Pupils equal round and reactive to light bilaterally.  CN V: Facial sensation is normal.  CN VII: Full and symmetric facial movement.  CN VIII: Hearing is normal.  CN IX, X: Palate elevates symmetrically. Normal gag reflex.  CN XI: Shoulder shrug strength is normal.  CN XII: Tongue midline without atrophy or fasciculations.    Motor  Normal muscle bulk throughout. No pronator drift.    Sensory  Sensation is intact to light touch, pinprick, vibration and proprioception in all four extremities.    Reflexes  Deep tendon reflexes are 2+ and symmetric in all four extremities.    Coordination    Finger-to-nose, rapid alternating movements and heel-to-shin normal bilaterally without dysmetria.    Gait  Normal casual, toe, heel and tandem gait.    Physical Exam  Eyes:      General: Lids are normal.      Extraocular Movements: Extraocular movements intact.      Pupils: Pupils are equal, round, and reactive to light.   Skin:     Comments: Cranial incision c/d/I. Some mild erythema along middle portion of incision. No drainage, no swelling. Staples and sutures removed without difficulty.    Neurological:      Coordination: Coordination is intact.      Deep Tendon Reflexes: Reflexes are normal and symmetric.   Psychiatric:         Speech: Speech normal.         Imaging:   Imaging Results: CT head wo IV contrast 06/18/2024    Narrative  Interpreted By:  Disha Hanks and Afshari Mirak Sohrab  STUDY:  CT HEAD WO IV CONTRAST;  6/18/2024 5:51 pm    INDICATION:  Signs/Symptoms:post op.    COMPARISON:  CT head 06/05/2024.    ACCESSION NUMBER(S):  [id number]    ORDERING CLINICIAN:  RENÉE JAVIER    TECHNIQUE:  Noncontrast axial CT scan of the head was performed. Angled reformats  in brain and bone windows were generated. The images were reviewed in  bone, brain, blood and soft tissue windows.    FINDINGS:  Patient is status post left frontal craniotomy for resection of  an  extra-axial left frontal lesion. There is mild soft tissue swelling  and small hematoma overlying the craniectomy site.    Deep to the craniectomy site, there is an extra-axial air containing  hyperdense collection measuring up to 5 mm in maximal thickness.  There is a resection cavity in the left frontal lobe with trace  amount of postsurgical blood and few air locules. Small amount of  pneumocephalus overlying the left frontal lobe. There is ongoing  edema and effacement of the sulci in the left frontal region without  evidence of midline shift or herniation.  The ventricles and basal  cisterns are within normal limits.    The gray-white differentiation is otherwise intact.    The calvarium is otherwise unremarkable.    Paranasal sinuses and mastoid air cells are clear.    Impression  Status post left frontal craniotomy for a mass resection. Small  amount of extra-axial air and hemorrhage underlying the craniotomy  site. There is a resection cavity in the left frontal lobe with small  amount of postsurgical blood and gas. Ongoing vasogenic edema and  mass effect within the left frontal region.    I personally reviewed the images/study and I agree with the findings  as stated by resident physician Dr. Sohan Etienne . This study  was interpreted at [place], [place].    MACRO:  None    Signed by: Disha Hanks 6/18/2024 7:40 PM  Dictation workstation:   [id number]      CT head wo IV contrast 06/05/2024    Narrative  Interpreted By:  Lisa Caruso,  STUDY:  CT HEAD WO IV CONTRAST;  6/5/2024 7:00 am    INDICATION:  Signs/Symptoms:seizure.    COMPARISON:  MRI brain 10 December 2015    ACCESSION NUMBER(S):  [id number]    ORDERING CLINICIAN:  LISA MORENO    TECHNIQUE:  CT of the brain from the skull vertex to the skull base, without  intravenous contrast    FINDINGS:  ACUTE INTRA-AXIAL HEMORRHAGE:  Negative    ACUTE EXTRA-AXIAL/SUBDURAL HEMORRHAGE:   Negative    ACUTE INTRACRANIAL MASS EFFECT:  No midline shift or basilar  herniation, but there is probably an element of gyral swelling and  sulcal effacement in the left frontal lobe around the area of  vasogenic edema described below    CT EVIDENCE OF ACUTE / SUBACUTE TERRITORIAL ISCHEMIA:  Negative    VENTRICLES:  Normal caliber and configuration    OTHER BRAIN FINDINGS:  Large area of vasogenic cerebral edema in  subcortical left frontal lobe. No such finding on MRI brain 10  December 2015    INCLUDED PARANASAL SINUSES: All clear    INCLUDED MASTOID AIR CELLS: All clear    SKULL:  No lytic or blastic lesion    EXTRACRANIAL SOFT TISSUES:  Scalp and occular globes grossly normal  by CT    Impression  MRI brain without and with intravenous contrast recommended to  further evaluate a large area of vasogenic edema in the left frontal  lobe, was not present on the only pertinent comparison exam, MRI  brain 10 December 2015. An underlying mass may be present    No other acute intracranial process    No acute intracranial hemorrhage    No compelling CT evidence for large acute territorial infarct    No subdural collection    MACRO:  None    Signed by: Guero Caruso 6/5/2024 7:14 AM  Dictation workstation:   [id number]    MR brain w and wo IV contrast 06/19/2024    Narrative  Interpreted By:  Jackelyn Mac,  Fredis Wolfe  STUDY:  MR BRAIN W AND WO IV CONTRAST;  6/19/2024 9:22 am    INDICATION:  Signs/Symptoms:post op.  Status post resection of left frontal  meningioma.    COMPARISON:  MRI brain 06/05/2024, CT head 06/18/2024    ACCESSION NUMBER(S):  [id number]    ORDERING CLINICIAN:  RENÉE JAVIER    TECHNIQUE:  Axial T2, FLAIR, DWI, gradient echo T2 and T1 weighted images of  brain were acquired. Post contrast T1 weighted images were acquired  after administration of 25 ML Dotarem gadolinium based intravenous  contrast. Multiplanar reconstructions were obtained of postcontrast  T1 images.    FINDINGS:  Status  post frontal craniotomy for resection of left frontal  meningioma. Subgaleal fluid is noted extending along left calvarium  noted. Deep to the craniotomy site, there is trace extra-axial fluid  measuring 0.2 cm along the left cerebral hemisphere. Post-contrast  imaging demonstrates focal dural enhancement and slight thickening.    Within the perirolandic region involving the posterosuperior frontal  gyrus and pre and postcentral gyrus there is persistent T2/FLAIR  hyperintense signal favoring vasogenic edema from prior mass effect.  Minimal gyriform enhancement is noted along the anterior border of  the resection cavity which is nonspecific but most likely  postoperative. Susceptibility artifact within the region of resection  favors hemosiderin deposition.    No focal diffusion restriction abnormality is evident to suggest  acute infarct. No significant midline shift. Outside of the resection  cavity, the ventricles, sulci, and basal cisterns are unremarkable in  appearance.    T2 hyperintense and FLAIR hypointense foci are present within the  basal ganglia favoring perivascular spaces.    Visualized vascular flow voids are unremarkable.    The paranasal sinuses and mastoid air cells unremarkable.    Impression  Expected postoperative appearance status post left frontal meningioma  resection. No evidence of residual tumor. No acute intracranial  abnormality.    I personally reviewed the images/study and I agree with the resident  findings as stated by Yaneth Jacobo MD. This study was interpreted at  [place], [place].    MACRO:  None    Signed by: Jackelyn Mac 6/19/2024 10:09 AM  Dictation workstation:   [id number]      MR brain w and wo IV contrast 06/05/2024    Narrative  Interpreted By:  Ella Pulliam,  STUDY:  MR BRAIN W AND WO IV CONTRAST;  6/5/2024 2:04 pm    INDICATION:  Signs/Symptoms:?brain mass, SZ last pm.    COMPARISON:  MRI brain December 10, 2015 and  head CT June 5, 2024    ACCESSION NUMBER(S):  [id number]    ORDERING CLINICIAN:  JUAN ROBB    TECHNIQUE:  Axial T2, FLAIR, DWI, gradient echo T2 and  T1 weighted images of  brain were acquired. Post contrast T1 weighted images were acquired  after administration of 26 mL Dotarem gadolinium based intravenous  contrast.    FINDINGS:  Some of the images are degraded by patient motion.    There is an extra-axial, dural-based mass overlying the left frontal  lobe measuring approximately 2.7 cm in craniocaudal dimension by 2.9  cm in AP dimension by 2.8 cm in transverse dimension. There is  thickening of the adjacent dura. There is associated mass effect on  the adjacent parenchyma. There is also associated vasogenic edema.  There is little to no measurable midline shift.    Diffusion-weighted imaging demonstrates no evidence of diffusion  restriction to suggest the presence of acute ischemic injury.  Prominent perivascular spaces and/or tiny, remote lacunar infarcts  are noted in the basal ganglia. There is no abnormal parenchymal  enhancement.    There is minimal mucosal thickening within scattered paranasal  sinuses and partial opacification of a few inferior mastoid air cells.    Impression  Extra-axial, dural-based enhancing mass overlying the left frontal  lobe with associated mass effect and vasogenic edema is nonspecific  but could represent a meningioma.      MACRO:  None    Signed by: Ella Pulliam 6/5/2024 2:21 PM  Dictation workstation:   [id number]    Lab Results   Component Value Date     06/20/2024    K 4.1 06/20/2024     06/20/2024    CO2 28 06/20/2024    BUN 20 06/20/2024    CREATININE 0.68 06/20/2024    GLUCOSE 129 (H) 06/20/2024    CALCIUM 9.3 06/20/2024     Lab Results   Component Value Date    WBC 12.6 (H) 06/20/2024    HGB 10.0 (L) 06/20/2024    HCT 32.1 (L) 06/20/2024    MCV 87 06/20/2024     06/20/2024           Assessment & Plan     Assessment/Plan   Diagnoses and all  orders for this visit:    Patient is doing well post op from meningioma resection.    I discussed CNS Tumor board recommendations and plan for continued imaging surveillance.    Patient is doing well post operatively from GTR of Grade I meningioma.     Incision healing well. Proper wound care discussed with the patient.    Keflex for mild cellulitis of the skin incision for 5 days.    Referral to Neurology-Epilepsy for anti-seizure management. Continue Keppra at current dose.     Neurosurgery followup in 3 months with MRI at [facility]      Meningioma (Multi)  -     MR brain w and wo IV contrast; Future  -     Referral to Neurology; Future  -     Follow Up In Neurosurgery; Future  -     cephalexin (Keflex) 500 mg capsule; Take 1 capsule (500 mg) by mouth 2 times a day for 5 days.  Cellulitis of head except face  -     cephalexin (Keflex) 500 mg capsule; Take 1 capsule (500 mg) by mouth 2 times a day for 5 days.                    Medical History     Past Medical History:   Diagnosis Date    Delayed emergence from general anesthesia     Encounter for other preprocedural examination 05/25/2017    Pre-op testing    Encounter for preprocedural respiratory examination 05/24/2017    Preop pulmonary/respiratory exam    GERD (gastroesophageal reflux disease)     Hypertension     Other conditions influencing health status 10/14/2016    Difficulty Breathing (Dyspnea)    Seizure disorder (Multi)     Vision loss      Past Surgical History:   Procedure Laterality Date    HYSTERECTOMY  06/12/2013    Hysterectomy    KNEE ARTHROSCOPY W/ DEBRIDEMENT  05/09/2013    Arthroscopy Knee Right    MR HEAD ANGIO WO IV CONTRAST  12/10/2015    MR HEAD ANGIO WO IV CONTRAST 12/10/2015 [facility] CLINICAL LEGACY    OTHER SURGICAL HISTORY  01/05/2022    Transobturator sling placement    OTHER SURGICAL HISTORY  01/09/2020    Colonoscopy     Social History     Tobacco Use    Smoking status: Never    Smokeless tobacco: Never   Vaping Use    Vaping status:  Never Used   Substance Use Topics    Alcohol use: Never    Drug use: Never     No family history on file.  No Known Allergies  Current Outpatient Medications   Medication Instructions    acetaminophen (Tylenol) 500 mg tablet 1 tablet, oral, Every 6 hours PRN    cephalexin (KEFLEX) 500 mg, oral, 2 times daily    dexAMETHasone (Decadron) 2 mg tablet Take 2 tablets (4 mg) by mouth 4 times a day for 2 days, THEN 1.5 tablets (3 mg) 4 times a day for 2 days, THEN 1 tablet (2 mg) 4 times a day for 2 days, THEN 0.5 tablets (1 mg) 4 times a day for 2 days.    DULoxetine (CYMBALTA) 60 mg, oral, Daily    gabapentin (NEURONTIN) 600 mg, oral, 3 times daily    hydrOXYzine HCL (ATARAX) 25 mg, oral, Nightly    levETIRAcetam (KEPPRA) 1,000 mg, oral, 2 times daily    naproxen (NAPROSYN) 500 mg, oral, 2 times daily after meals, Do not restart until follow up with Neurosurgery    omeprazole (PriLOSEC) 40 mg DR capsule TAKE 1 CAPSULE BY MOUTH EVERY DAY----MUST MAKE APPT    oxyCODONE (ROXICODONE) 5 mg, oral, Every 6 hours PRN    propranolol LA (INDERAL LA) 80 mg, oral, Daily    sennosides-docusate sodium (Loretta-Colace) 8.6-50 mg tablet 2 tablets, oral, Daily, For constipation while taking pain medication Oxycodone

## 2024-07-05 ENCOUNTER — OFFICE VISIT (OUTPATIENT)
Dept: NEUROSURGERY | Facility: HOSPITAL | Age: 59
End: 2024-07-05
Payer: COMMERCIAL

## 2024-07-05 VITALS
TEMPERATURE: 97.7 F | SYSTOLIC BLOOD PRESSURE: 146 MMHG | HEART RATE: 65 BPM | BODY MASS INDEX: 48.9 KG/M2 | RESPIRATION RATE: 19 BRPM | OXYGEN SATURATION: 97 % | WEIGHT: 293 LBS | DIASTOLIC BLOOD PRESSURE: 80 MMHG

## 2024-07-05 DIAGNOSIS — D32.9 MENINGIOMA (MULTI): Primary | ICD-10-CM

## 2024-07-05 DIAGNOSIS — L03.811 CELLULITIS OF HEAD EXCEPT FACE: ICD-10-CM

## 2024-07-05 PROCEDURE — 99211 OFF/OP EST MAY X REQ PHY/QHP: CPT | Performed by: NEUROLOGICAL SURGERY

## 2024-07-05 RX ORDER — CEPHALEXIN 500 MG/1
500 CAPSULE ORAL 2 TIMES DAILY
Qty: 10 CAPSULE | Refills: 0 | Status: SHIPPED | OUTPATIENT
Start: 2024-07-05 | End: 2024-07-10

## 2024-07-05 ASSESSMENT — VISUAL ACUITY: VA_NORMAL: 1

## 2024-07-05 ASSESSMENT — PAIN SCALES - GENERAL: PAINLEVEL: 0-NO PAIN

## 2024-07-09 ENCOUNTER — HOME CARE VISIT (OUTPATIENT)
Dept: HOME HEALTH SERVICES | Facility: HOME HEALTH | Age: 59
End: 2024-07-09
Payer: COMMERCIAL

## 2024-07-09 VITALS
RESPIRATION RATE: 16 BRPM | HEART RATE: 64 BPM | DIASTOLIC BLOOD PRESSURE: 82 MMHG | SYSTOLIC BLOOD PRESSURE: 140 MMHG | TEMPERATURE: 98.2 F | OXYGEN SATURATION: 96 %

## 2024-07-09 VITALS
SYSTOLIC BLOOD PRESSURE: 140 MMHG | DIASTOLIC BLOOD PRESSURE: 82 MMHG | OXYGEN SATURATION: 96 % | RESPIRATION RATE: 16 BRPM | TEMPERATURE: 98.2 F | HEART RATE: 64 BPM

## 2024-07-09 PROCEDURE — G0299 HHS/HOSPICE OF RN EA 15 MIN: HCPCS

## 2024-07-09 ASSESSMENT — ENCOUNTER SYMPTOMS
PERSON REPORTING PAIN: PATIENT
DENIES PAIN: 1

## 2024-07-09 ASSESSMENT — ACTIVITIES OF DAILY LIVING (ADL)
AMBULATION ASSISTANCE: 1
TOILETING: INDEPENDENT
DRESSING_UB_CURRENT_FUNCTION: INDEPENDENT
TELEPHONE USE ASSESSED: 1
FEEDING: INDEPENDENT
BATHING ASSESSED: 1
OASIS_M1830: 00
GROOMING_CURRENT_FUNCTION: INDEPENDENT
TRANSPORTATION ASSESSED: 1
LAUNDRY ASSESSED: 1
ORAL_CARE_CURRENT_FUNCTION: INDEPENDENT
USING THE TELPHONE: INDEPENDENT
SHOPPING: NEEDS ASSISTANCE
FEEDING ASSESSED: 1
TOILETING: 1
BATHING_CURRENT_FUNCTION: INDEPENDENT
ORAL_CARE_ASSESSED: 1
TRANSPORTATION: DEPENDENT
HOUSEKEEPING ASSESSED: 1
PREPARING MEALS: INDEPENDENT
AMBULATION ASSISTANCE: INDEPENDENT
LAUNDRY: INDEPENDENT
CURRENT_FUNCTION: INDEPENDENT
SHOPPING ASSESSED: 1
HOME_HEALTH_OASIS: 00
PHYSICAL TRANSFERS ASSESSED: 1
GROOMING ASSESSED: 1
LIGHT HOUSEKEEPING: INDEPENDENT
DRESSING_LB_CURRENT_FUNCTION: INDEPENDENT

## 2024-07-09 ASSESSMENT — PAIN SCALES - PAIN ASSESSMENT IN ADVANCED DEMENTIA (PAINAD)
NEGVOCALIZATION: 0
BREATHING: 0
BODYLANGUAGE: 0
FACIALEXPRESSION: 0
CONSOLABILITY: 0
CONSOLABILITY: 0 - NO NEED TO CONSOLE.
BODYLANGUAGE: 0 - RELAXED.
TOTALSCORE: 0
FACIALEXPRESSION: 0 - SMILING OR INEXPRESSIVE.
NEGVOCALIZATION: 0 - NONE.

## 2024-07-23 NOTE — PROGRESS NOTES
attempt made to reach patient for transitions of care outreach and no contact made with patient. Left voicemail with my name and contact information.

## 2024-07-29 ENCOUNTER — APPOINTMENT (OUTPATIENT)
Dept: RADIOLOGY | Facility: HOSPITAL | Age: 59
End: 2024-07-29
Payer: COMMERCIAL

## 2024-08-01 ENCOUNTER — APPOINTMENT (OUTPATIENT)
Dept: RADIOLOGY | Facility: HOSPITAL | Age: 59
End: 2024-08-01
Payer: COMMERCIAL

## 2024-08-03 DIAGNOSIS — M79.7 FIBROMYALGIA: Primary | ICD-10-CM

## 2024-08-03 DIAGNOSIS — N30.10 INTERSTITIAL CYSTITIS (CHRONIC) WITHOUT HEMATURIA: ICD-10-CM

## 2024-08-05 RX ORDER — GABAPENTIN 600 MG/1
600 TABLET ORAL 3 TIMES DAILY
Qty: 90 TABLET | Refills: 1 | Status: SHIPPED | OUTPATIENT
Start: 2024-08-05

## 2024-08-06 ENCOUNTER — PATIENT MESSAGE (OUTPATIENT)
Dept: OBSTETRICS AND GYNECOLOGY | Facility: CLINIC | Age: 59
End: 2024-08-06
Payer: COMMERCIAL

## 2024-08-06 NOTE — TELEPHONE ENCOUNTER
Unable to process refill request, pt has not been seen in over a year. Sent pt a [employer] message to make an appt.

## 2024-08-07 RX ORDER — NALTREXONE HYDROCHLORIDE 50 MG/1
1 TABLET, FILM COATED ORAL
COMMUNITY
Start: 2024-06-02

## 2024-08-08 ENCOUNTER — APPOINTMENT (OUTPATIENT)
Dept: RADIOLOGY | Facility: HOSPITAL | Age: 59
End: 2024-08-08
Payer: COMMERCIAL

## 2024-08-08 ENCOUNTER — TELEPHONE (OUTPATIENT)
Dept: NEUROSURGERY | Facility: HOSPITAL | Age: 59
End: 2024-08-08
Payer: COMMERCIAL

## 2024-08-09 ENCOUNTER — HOSPITAL ENCOUNTER (EMERGENCY)
Facility: HOSPITAL | Age: 59
Discharge: HOME | End: 2024-08-09
Attending: GENERAL PRACTICE
Payer: COMMERCIAL

## 2024-08-09 VITALS
RESPIRATION RATE: 18 BRPM | OXYGEN SATURATION: 100 % | DIASTOLIC BLOOD PRESSURE: 76 MMHG | SYSTOLIC BLOOD PRESSURE: 122 MMHG | HEIGHT: 65 IN | TEMPERATURE: 98.2 F | WEIGHT: 293 LBS | BODY MASS INDEX: 48.82 KG/M2 | HEART RATE: 75 BPM

## 2024-08-09 DIAGNOSIS — Z48.89 ENCOUNTER FOR POST SURGICAL WOUND CHECK: Primary | ICD-10-CM

## 2024-08-09 PROCEDURE — 99281 EMR DPT VST MAYX REQ PHY/QHP: CPT

## 2024-08-09 RX ORDER — NALTREXONE HYDROCHLORIDE 50 MG/1
50 TABLET, FILM COATED ORAL
Qty: 30 TABLET | Refills: 0 | OUTPATIENT
Start: 2024-08-09

## 2024-08-09 ASSESSMENT — PAIN DESCRIPTION - LOCATION: LOCATION: HEAD

## 2024-08-09 ASSESSMENT — PAIN - FUNCTIONAL ASSESSMENT: PAIN_FUNCTIONAL_ASSESSMENT: 0-10

## 2024-08-09 ASSESSMENT — PAIN SCALES - GENERAL: PAINLEVEL_OUTOF10: 3

## 2024-08-09 ASSESSMENT — COLUMBIA-SUICIDE SEVERITY RATING SCALE - C-SSRS
6. HAVE YOU EVER DONE ANYTHING, STARTED TO DO ANYTHING, OR PREPARED TO DO ANYTHING TO END YOUR LIFE?: NO
1. IN THE PAST MONTH, HAVE YOU WISHED YOU WERE DEAD OR WISHED YOU COULD GO TO SLEEP AND NOT WAKE UP?: NO
2. HAVE YOU ACTUALLY HAD ANY THOUGHTS OF KILLING YOURSELF?: NO

## 2024-08-09 NOTE — ED TRIAGE NOTES
Pt to the ED from home for wound check, pt had brain tumor removed on 6/18/24, has wound to top of head with some redness and drainage. Pt wears CPAP and strap rubs against wound, and is irritating it. Pt denies fever/chills, also is leaving for vacation tomorrow and needs to get checked

## 2024-08-09 NOTE — DISCHARGE INSTRUCTIONS
You were evaluated emergency department and your exam was reassuring.  While on vacation, please make sure to wear a hat at all times when out in the sun to allow healing of the skin.  Please apply sunscreen if you forego a hat.  I would not recommend swimming in any bodies of water including the ocean, pools or hot tubs for your first week of vacation.  If healing continues to improve swimming pool might be okay however do not submerge your head in the ocean as salt water can delay healing.

## 2024-08-09 NOTE — ED PROVIDER NOTES
Emergency Department Provider Note        History of Present Illness     History provided by: Patient  Limitations to History: None  External Records Reviewed with Brief Summary:  Discharge summary on 6/20/2024 summarizing hospital admission regarding cerebral meningioma    HPI:  Sun Zimmerman is a 59 y.o. female past medical history significant for HLD, depression anxiety, GERD, fibromyalgia, obesity, Julio Cesar-en-Y surgery bypass, scleroderma, chronic interstitial cystitis, ZEENAT, chronic venous insufficiency and recently resected cerebral meningioma presenting to the emergency department for a wound check.  Patient presented as an outside hospital with concern for a seizure and was found with a frontal lobe meningioma which had been resected on June 18.  Patient has been recovering without difficulty but is here today to be evaluated prior to going on vacation as she has concerns for wound problems.  Patient's incision from brain surgery has been irritated by the CPAP that she wears at night.  States yesterday she noticed redness and was initially concern for infection.  Neurosurgeon is on vacation at this time but she was encouraged to come to the emergency department for clearance prior to traveling.  Denies affiliated fever, chills, headache or vision changes, no nausea vomiting or diarrhea.  No abdominal pain, no chest pain or shortness of breath.  States she is otherwise been in her normal state of health.  Physical Exam   Triage vitals:  T 35.7 °C (96.3 °F)  HR 73  BP    RR 16  O2 99 % None (Room air)    General: Awake, alert, in no acute distress  Eyes: Gaze conjugate.  No scleral icterus or injection  HENT: Normo-cephalic, atraumatic. No stridor  Scalp:   CV: Regular rate, regular rhythm. Radial pulses 2+ bilaterally  Resp: Breathing non-labored, speaking in full sentences.  Clear to auscultation bilaterally  GI: Soft, non-distended, non-tender. No rebound or guarding.  MSK/Extremities: No gross bony  deformities. Moving all extremities  Skin: Warm. Appropriate color  Neuro: Alert. Oriented. Face symmetric. Speech is fluent.  Gross strength and sensation intact in b/l UE and LEs  Psych: Appropriate mood and affect    Medical Decision Making & ED Course   Medical Decision Makin y.o. female presenting to the emergency department for a wound check.  On physical exam, patient's wound is well appearing, has appropriate granulation tissue without wound dehiscence, surrounding infection, induration or erythema.  See image and patient's physical exam for full detail.  Do not feel why labs or additional imaging are warranted at this time given patient's overall well appearance with no other active issues.  Counseled patient on wearing a hat when out in the sun or to apply sunscreen to allow for continued wound healing and scar recovery.  Counseled patient to avoid swimming in the ocean as this will delay healing and puts her at risk for infection.  Also counseled her to avoid submerging her head in pools or hot tubs to avoid potential risk of infection or delayed healing.  Patient is agreeable with this recommendation and feels comfortable with plan for discharge.  Do not feel that further workup indicated at this time. Discussed results, diagnosis/differential, and plan with patient. Patient advised to follow up with primary physician in 2-3 days. Discussed return precautions and encouraged patient to return to the Emergency Department for any concerning symptoms or worsening condition. Patient expresses understanding and is in agreement. All questions answered. Patient discharged in stable condition.  ----     Social Determinants of Health which Significantly Impact Care: None identified     EKG Independent Interpretation: EKG not obtained    Independent Result Review and Interpretation: None obtained    Chronic conditions affecting the patient's care: As documented above in MDM    The patient was discussed with  the following consultants/services: None    Care Considerations: As documented above in [facility]    ED Course:  Diagnoses as of 08/09/24 1114   Encounter for post surgical wound check     Disposition   As a result of the work-up, the patient was discharged home.  she was informed of her diagnosis and instructed to come back with any concerns or worsening of condition.  she and was agreeable to the plan as discussed above.  she was given the opportunity to ask questions.  All of the patient's questions were answered.    Procedures   Procedures    Patient seen and discussed with ED attending physician.    Maeve Corrales DO  Emergency Medicine       Maeve Corrales DO  Resident  08/09/24 1110

## 2024-08-12 NOTE — DOCUMENTATION CLARIFICATION NOTE
"    PATIENT:               RUTH DONNELLY  ACCT #:                  [id number]  MRN:                       [id number]  :                       1965  ADMIT DATE:       2024 7:45 AM  DISCH DATE:        2024 3:18 PM  RESPONDING PROVIDER #:        [id number]          PROVIDER RESPONSE TEXT:    Cerebral Edema    CDI QUERY TEXT:    Clarification      Instruction:    Based on your assessment of the patient and the clinical information, please provide the requested documentation by clicking on the appropriate radio button and enter any additional information if prompted.    Question: Please further clarify if there is a diagnosis related to the clinical information    When answering this query, please exercise your independent professional judgment. The fact that a question is being asked, does not imply that any particular answer is desired or expected.    The patient's clinical indicators include:  Clinical Information: 59 y.o. presenting with Cerebral meningioma    Clinical Indicators:  MRI \" There is associated mass effect on  the adjacent parenchyma. There is also associated vasogenic edema.  There is little to no measurable midline shift.\"    CTH  \"Deep to the craniectomy site, there is an extra-axial air containing  hyperdense collection measuring up to 5 mm in maximal thickness.  There is a resection cavity in the left frontal lobe with trace  amount of postsurgical blood and few air locules. Small amount of  pneumocephalus overlying the left frontal lobe. There is ongoing  edema and effacement of the sulci in the left frontal region without  evidence of midline shift or herniation.\"       HP by Dr. Smith \"who presented from OSH due to seizure receiving 1.5g Keppra. CTH with large area of edema and concern for left frontal underlying lesion. MRI Brain w/wo demonstrating a 2.7 x 2.9 x 2.8 cm L frontal lobe mass.\" \"Patient was recommended for surgical resection and presents for virtual visit to " "discuss treatment. \"    Treatment: CTH,  Q2hr neuro checks, Left frontal craniotomy for resection of meningioma, Dex 8mg q6 IV    Risk Factors: Meningioma  Options provided:  -- Cerebral Edema  -- Radiological findings of edema  are clinically insignificant  -- Other - I will add my own diagnosis  -- Refer to Clinical Documentation Reviewer    Query created by: Priyanka Mirza on 8/8/2024 9:02 AM      Electronically signed by:  RENÉE JAVIER MD 8/12/2024 7:18 AM          "

## 2024-08-14 ENCOUNTER — APPOINTMENT (OUTPATIENT)
Dept: OBSTETRICS AND GYNECOLOGY | Facility: CLINIC | Age: 59
End: 2024-08-14
Payer: COMMERCIAL

## 2024-08-15 RX ORDER — HYDROXYZINE HYDROCHLORIDE 25 MG/1
25 TABLET, FILM COATED ORAL NIGHTLY
Qty: 90 TABLET | Refills: 0 | Status: SHIPPED | OUTPATIENT
Start: 2024-08-15

## 2024-09-23 NOTE — PROGRESS NOTES
"59 y.o.female presenting in initial consultation for seizures.    4D CLASSIFICATION - EPILEPTIC Paroxysmal Episodes  Semiology: Hypermotor seizure  Onset: 6/3/2024  Frequency: 3x  Last event: 6/5/2024  EZ: Left frontal  Etiology: Structural (left frontal dural based brain mass)  Comorbidities: ZEENAT, fibromyalgia, RLS, OA, scleroderma    Workup:  EEG: (6/6/2024) Normal EEG  AMU: Has not had  CTH: (6/18/2024) S/p left frontal craniotomy with mass resection (cavity in left frontal lobe; small amount of postsurgical blood and gas; ongoing left frontal vasogenic edema and mass effect)  MRI w/wo: (6/19/2024) Post-op left frontal meningioma resecton  EKG-NY: (6/5/2024) ,     Prior ASMs: None  Current ASMs: LEV 0663-6157 (seizures), -600-600 (fibromyalgia)    Handedness: RIGHT  -------------------------------------------------    EPILEPSY HISTORY  In the spring of 2024, the patient had been having bad dreams, and apparently \"physically acting out her nightmares.\"  could generally calm her down. She'd also noticed some mild decreased function in her right arm, though nothing significant enough to warrant medical attention.     On June 3rd, 2024, she'd gone to bed feeling normal, but awoke with a tongue bite and feeling sore.  had said she'd had a particularly violent dream, as she was shaking a lot. She did state she felt different after this night's sleep, as if she were in a fog. She started to feel better as the day progressed. However, two nights later on 6/5/2024, she remembers waking up, and feeling her arms and legs thrashing about. Per , he was woken up when he heard patient scream and then witnessed her violently thrashing her bilateral arms asynchronously.  This lasted about 2 minutes.  Her eyes were open.  Her head was not turned towards one side. She did not lose control of her bowel or bladder, after the episode she was awake and could answer questions but was not " completely back to baseline.    He called EMS, and she was taken to [facility] ED. Underwent evaluation, and was found to have a left frontal meningioma. Transferred to [facility], started on LEV 8398-1612, and referred to  Neurosurgery (Sarah) for assessment. She underwent resection on 2024, and continued on LEV 3322-2662 post-op.    Since then, she has not had any further seizure activity. She's been taking and tolerating the LEV 1572-7477 without issues, and denies drowsiness, irritability, or any other symptoms. She was referred to  Epilepsy for determination of longterm medication continuation v wean.     EPILEPSY RISK FACTORS:  Gestation and Birth: 5 weeks early by   Febrile Seizures: No  Milestones: On time  CNS Infections: Denies  CNS Surgeries: Denies  Head Trauma: Denies  FHx of Seizures: Denies    Currently Driving?: NO  - Hx of MVA? Denies    REVIEW OF SYSTEMS  ROS: As per HPI. All other systems have been reviewed and are negative for complaint. No changes in the past medical, family, or social histories since the previous review on per the patient.    MEDICAL AND SURGICAL HISTORY  Past Medical History:   Diagnosis Date    Delayed emergence from general anesthesia     Encounter for other preprocedural examination 2017    Pre-op testing    Encounter for preprocedural respiratory examination 2017    Preop pulmonary/respiratory exam    GERD (gastroesophageal reflux disease)     Hypertension     Other conditions influencing health status 10/14/2016    Difficulty Breathing (Dyspnea)    Seizure disorder (Multi)     Vision loss      Past Surgical History:   Procedure Laterality Date    HYSTERECTOMY  2013    Hysterectomy    KNEE ARTHROSCOPY W/ DEBRIDEMENT  2013    Arthroscopy Knee Right    MR HEAD ANGIO WO IV CONTRAST  12/10/2015    MR HEAD ANGIO WO IV CONTRAST 12/10/2015 [facility] CLINICAL LEGACY    OTHER SURGICAL HISTORY  2022    Transobturator sling placement     OTHER SURGICAL HISTORY  01/09/2020    Colonoscopy       FAMILY HISTORY  No family history on file.    SOCIAL HISTORY:  Relationships: , two daughters, grandson (Alec)  Education: Some college  Employment: [employer]   Tobacco: Denies  Alcohol: Occasional glass/craft  Marijuana: Denies  Drugs: Denies  Caffeine: 1 cup of coffee per day  Hobbies: Spending time with grandchildren, camping    ALLERGIES  No Known Allergies    MEDS  Current Outpatient Medications   Medication Instructions    acetaminophen (Tylenol) 500 mg tablet 1 tablet, oral, Every 6 hours PRN    dexAMETHasone (Decadron) 2 mg tablet Take 2 tablets (4 mg) by mouth 4 times a day for 2 days, THEN 1.5 tablets (3 mg) 4 times a day for 2 days, THEN 1 tablet (2 mg) 4 times a day for 2 days, THEN 0.5 tablets (1 mg) 4 times a day for 2 days.    DULoxetine (CYMBALTA) 60 mg, oral, Daily    gabapentin (NEURONTIN) 600 mg, oral, 3 times daily    hydrOXYzine HCL (ATARAX) 25 mg, oral, Nightly    levETIRAcetam (KEPPRA) 1,000 mg, oral, 2 times daily    naltrexone (Depade) 50 mg tablet 1 tablet, oral, Daily (0630)    naproxen (NAPROSYN) 500 mg, oral, 2 times daily after meals, Do not restart until follow up with Neurosurgery    omeprazole (PriLOSEC) 40 mg DR capsule TAKE 1 CAPSULE BY MOUTH EVERY DAY----MUST MAKE APPT    oxyCODONE (ROXICODONE) 5 mg, oral, Every 6 hours PRN    propranolol LA (INDERAL LA) 80 mg, oral, Daily    sennosides-docusate sodium (Loretta-Colace) 8.6-50 mg tablet 2 tablets, oral, Daily, For constipation while taking pain medication Oxycodone     EXAM  This examination was performed over telehealth video by discussion and commands. Omissions of typical neurological exam elements are due to limitations of video. Patient was awake, alert, and oriented. Able to carry on conversation about history and current events. Her speech was fluid and clear, no dysarthria. EOMI appeared intact to natural movement. Face grossly symmetric with  symmetric activation during conversation. Hearing appeared intact (unable to lateralize). Head turning and shoulder shrugging seemingly intact. Moving all four extremities. No apparent coordination issues on observation. Unable to accurately test sensation, reflects, or gait (though she denied issues with these).    **I performed this visit using real-time telehealth tools, including a video connection between patient (Sun Case Elsie, from home) and myself (Margarito Mcgill MD, from office)      ASSESSMENT AND PLAN  59 y.o.female presenting in initial consultation for episodes concerning for seizures. Her semiology is described above. Seizure free post-op, and taking/tolerating LEV 3349-9493. We had a long discussion regarding risks and benefits of continuing on the medication v weaning. She voiced concerns that she does not want to experience a seizure again, even if at lower risk, and would like to reduce the medication if possible, but stay on it. Will decrease dose to -500. Seizure restrictions for time being, follow-up in 3-6 months.    - Decrease LEV to 500-500; instructed to reach off with any breakthrough seizure activity  - Seizure precautions (including no driving), follow-up in 3-6 months    55 minutes spent on this patient's case. Greater than 50% of the visit was spent in direct counseling and coordination of care.    Margarito Mcgill MD    Epilepsy Center

## 2024-09-25 ENCOUNTER — TELEMEDICINE (OUTPATIENT)
Dept: NEUROLOGY | Facility: CLINIC | Age: 59
End: 2024-09-25

## 2024-09-25 DIAGNOSIS — D32.9 MENINGIOMA (MULTI): ICD-10-CM

## 2024-09-25 PROCEDURE — 99204 OFFICE O/P NEW MOD 45 MIN: CPT | Performed by: PSYCHIATRY & NEUROLOGY

## 2024-09-25 PROCEDURE — 99214 OFFICE O/P EST MOD 30 MIN: CPT | Mod: 95 | Performed by: PSYCHIATRY & NEUROLOGY

## 2024-09-25 NOTE — PATIENT INSTRUCTIONS
"You have been seen today in Epilepsy clinic for your seizures. As we discussed:    Please continue taking levetiracetam (Keppra), which we'll reduce to 500mg twice a day.  At higher doses, some people experience drowsiness or irritability. Not something that I'd expect on your dose, but let me know if this is something you experience.    As we discussed--for now--you must not drive, use heavy machinery, climb heights, or engage in any other activity that would cause harm/death to yourself or others were you to lose awareness/consciousness and have a seizure. These restrictions should stay in place until at least 6 months of seizure freedom and clearance from physician. Alcohol and sleep deprivation may provoke seizures. It is best to avoid alcohol and to get sufficient sleep each night.    Compliance education: It is important to continue to try and achieve seizure control because of the potential for injury and illness due to seizures. In a very small minority of patients with generalized tonic clonic seizures (\"grand mal\"), breathing or heart function can stop during a seizure and result in demise (sudden unexpected death in epilepsy or SUDEP). [place] from seizures prevents this kind of outcome.    Having Epilepsy can be hard for things like transportation, employment, housing, etc. If you find that you need additional resources, I invite you to call the [facility].  Their staff are trained to assess need and refer to appropriate services. You can reach them at [phone].     I also invite you to go the website, [employer]Epile[facility]org. They are a community--based locally in [facility]--that hosts seminars, group events, and many other amazing opportunities to learn more about seizures, and the shared experiences of other people with epilepsy and their families and friends. It's an incredible group!    If you have any questions, please call my office at [phone]. If you have any " sudden new concerning or worsening symptoms, call 911 and go to the Emergency Room. Otherwise, it was good seeing you today, and we will see you back in 3-6 months.    All best,    Margarito Mcgill MD  Epilepsy Center - [facility]

## 2024-10-05 ENCOUNTER — APPOINTMENT (OUTPATIENT)
Dept: RADIOLOGY | Facility: HOSPITAL | Age: 59
End: 2024-10-05

## 2024-10-10 ENCOUNTER — APPOINTMENT (OUTPATIENT)
Dept: NEUROSURGERY | Facility: CLINIC | Age: 59
End: 2024-10-10
Payer: COMMERCIAL

## 2024-10-14 ENCOUNTER — HOSPITAL ENCOUNTER (OUTPATIENT)
Dept: RADIOLOGY | Facility: HOSPITAL | Age: 59
End: 2024-10-14

## 2024-10-27 DIAGNOSIS — D32.9 MENINGIOMA (MULTI): ICD-10-CM

## 2024-10-27 DIAGNOSIS — L03.811 CELLULITIS OF HEAD EXCEPT FACE: ICD-10-CM

## 2024-10-27 DIAGNOSIS — M79.7 FIBROMYALGIA: ICD-10-CM

## 2024-10-28 RX ORDER — GABAPENTIN 600 MG/1
600 TABLET ORAL 3 TIMES DAILY
Qty: 90 TABLET | Refills: 1 | OUTPATIENT
Start: 2024-10-28

## 2024-10-31 ENCOUNTER — PATIENT MESSAGE (OUTPATIENT)
Dept: RHEUMATOLOGY | Facility: CLINIC | Age: 59
End: 2024-10-31
Payer: COMMERCIAL

## 2024-10-31 DIAGNOSIS — M79.7 FIBROMYALGIA: ICD-10-CM

## 2024-10-31 RX ORDER — CEPHALEXIN 500 MG/1
CAPSULE ORAL
Qty: 10 CAPSULE | Refills: 0 | OUTPATIENT
Start: 2024-10-31

## 2024-11-01 ENCOUNTER — LAB (OUTPATIENT)
Dept: LAB | Facility: LAB | Age: 59
End: 2024-11-01
Payer: COMMERCIAL

## 2024-11-01 ENCOUNTER — HOSPITAL ENCOUNTER (OUTPATIENT)
Dept: RADIOLOGY | Facility: HOSPITAL | Age: 59
Discharge: HOME | End: 2024-11-01
Payer: COMMERCIAL

## 2024-11-01 DIAGNOSIS — D32.9 MENINGIOMA (MULTI): ICD-10-CM

## 2024-11-01 DIAGNOSIS — Z00.00 ROUTINE GENERAL MEDICAL EXAMINATION AT A HEALTH CARE FACILITY: ICD-10-CM

## 2024-11-01 LAB
25(OH)D3 SERPL-MCNC: 28 NG/ML (ref 30–100)
ALBUMIN SERPL BCP-MCNC: 3.8 G/DL (ref 3.4–5)
ALP SERPL-CCNC: 85 U/L (ref 33–136)
ALT SERPL W P-5'-P-CCNC: 37 U/L (ref 7–45)
ANION GAP SERPL CALC-SCNC: 15 MMOL/L (ref 10–20)
AST SERPL W P-5'-P-CCNC: 36 U/L (ref 9–39)
BASOPHILS # BLD AUTO: 0.04 X10*3/UL (ref 0–0.1)
BASOPHILS NFR BLD AUTO: 0.8 %
BILIRUB SERPL-MCNC: 0.6 MG/DL (ref 0–1.2)
BUN SERPL-MCNC: 15 MG/DL (ref 6–23)
CALCIUM SERPL-MCNC: 8.7 MG/DL (ref 8.6–10.3)
CHLORIDE SERPL-SCNC: 103 MMOL/L (ref 98–107)
CHOLEST SERPL-MCNC: 151 MG/DL (ref 0–199)
CHOLESTEROL/HDL RATIO: 3.5
CO2 SERPL-SCNC: 26 MMOL/L (ref 21–32)
CREAT SERPL-MCNC: 0.8 MG/DL (ref 0.5–1.05)
EGFRCR SERPLBLD CKD-EPI 2021: 84 ML/MIN/1.73M*2
EOSINOPHIL # BLD AUTO: 0.04 X10*3/UL (ref 0–0.7)
EOSINOPHIL NFR BLD AUTO: 0.8 %
ERYTHROCYTE [DISTWIDTH] IN BLOOD BY AUTOMATED COUNT: 15.9 % (ref 11.5–14.5)
EST. AVERAGE GLUCOSE BLD GHB EST-MCNC: 111 MG/DL
GLUCOSE SERPL-MCNC: 107 MG/DL (ref 74–99)
HBA1C MFR BLD: 5.5 %
HCT VFR BLD AUTO: 37.3 % (ref 36–46)
HDLC SERPL-MCNC: 43.5 MG/DL
HGB BLD-MCNC: 11 G/DL (ref 12–16)
IMM GRANULOCYTES # BLD AUTO: 0.02 X10*3/UL (ref 0–0.7)
IMM GRANULOCYTES NFR BLD AUTO: 0.4 % (ref 0–0.9)
IRON SATN MFR SERPL: ABNORMAL %
IRON SERPL-MCNC: 29 UG/DL (ref 35–150)
LDLC SERPL CALC-MCNC: 84 MG/DL
LYMPHOCYTES # BLD AUTO: 1.41 X10*3/UL (ref 1.2–4.8)
LYMPHOCYTES NFR BLD AUTO: 27 %
MCH RBC QN AUTO: 22.8 PG (ref 26–34)
MCHC RBC AUTO-ENTMCNC: 29.5 G/DL (ref 32–36)
MCV RBC AUTO: 77 FL (ref 80–100)
MONOCYTES # BLD AUTO: 0.4 X10*3/UL (ref 0.1–1)
MONOCYTES NFR BLD AUTO: 7.7 %
NEUTROPHILS # BLD AUTO: 3.31 X10*3/UL (ref 1.2–7.7)
NEUTROPHILS NFR BLD AUTO: 63.3 %
NON HDL CHOLESTEROL: 108 MG/DL (ref 0–149)
NRBC BLD-RTO: 0 /100 WBCS (ref 0–0)
PLATELET # BLD AUTO: 235 X10*3/UL (ref 150–450)
POTASSIUM SERPL-SCNC: 4.5 MMOL/L (ref 3.5–5.3)
PROT SERPL-MCNC: 6.1 G/DL (ref 6.4–8.2)
RBC # BLD AUTO: 4.83 X10*6/UL (ref 4–5.2)
SODIUM SERPL-SCNC: 139 MMOL/L (ref 136–145)
TIBC SERPL-MCNC: ABNORMAL UG/DL
TRIGL SERPL-MCNC: 118 MG/DL (ref 0–149)
TSH SERPL-ACNC: 2.46 MIU/L (ref 0.44–3.98)
UIBC SERPL-MCNC: >450 UG/DL (ref 110–370)
VIT B12 SERPL-MCNC: 315 PG/ML (ref 211–911)
VLDL: 24 MG/DL (ref 0–40)
WBC # BLD AUTO: 5.2 X10*3/UL (ref 4.4–11.3)

## 2024-11-01 PROCEDURE — 36415 COLL VENOUS BLD VENIPUNCTURE: CPT

## 2024-11-01 PROCEDURE — 84425 ASSAY OF VITAMIN B-1: CPT

## 2024-11-01 PROCEDURE — 83550 IRON BINDING TEST: CPT

## 2024-11-01 PROCEDURE — 83540 ASSAY OF IRON: CPT

## 2024-11-01 PROCEDURE — 82306 VITAMIN D 25 HYDROXY: CPT

## 2024-11-01 PROCEDURE — A9575 INJ GADOTERATE MEGLUMI 0.1ML: HCPCS | Performed by: NEUROLOGICAL SURGERY

## 2024-11-01 PROCEDURE — 70553 MRI BRAIN STEM W/O & W/DYE: CPT

## 2024-11-01 PROCEDURE — 85025 COMPLETE CBC W/AUTO DIFF WBC: CPT

## 2024-11-01 PROCEDURE — 83036 HEMOGLOBIN GLYCOSYLATED A1C: CPT

## 2024-11-01 PROCEDURE — 80061 LIPID PANEL: CPT

## 2024-11-01 PROCEDURE — 80053 COMPREHEN METABOLIC PANEL: CPT

## 2024-11-01 PROCEDURE — 84443 ASSAY THYROID STIM HORMONE: CPT

## 2024-11-01 PROCEDURE — 2550000001 HC RX 255 CONTRASTS: Performed by: NEUROLOGICAL SURGERY

## 2024-11-01 PROCEDURE — 82607 VITAMIN B-12: CPT

## 2024-11-01 RX ORDER — GABAPENTIN 600 MG/1
600 TABLET ORAL 3 TIMES DAILY
Qty: 90 TABLET | Refills: 1 | Status: SHIPPED | OUTPATIENT
Start: 2024-11-01

## 2024-11-01 RX ORDER — GADOTERATE MEGLUMINE 376.9 MG/ML
15 INJECTION INTRAVENOUS
Status: DISCONTINUED | OUTPATIENT
Start: 2024-11-01 | End: 2024-11-02 | Stop reason: HOSPADM

## 2024-11-01 RX ORDER — GADOTERATE MEGLUMINE 376.9 MG/ML
15 INJECTION INTRAVENOUS
Status: COMPLETED | OUTPATIENT
Start: 2024-11-01 | End: 2024-11-01

## 2024-11-05 LAB — VIT B1 PYROPHOSHATE BLD-SCNC: 102 NMOL/L (ref 70–180)

## 2024-11-11 ENCOUNTER — TELEMEDICINE (OUTPATIENT)
Dept: NEUROSURGERY | Facility: HOSPITAL | Age: 59
End: 2024-11-11
Payer: COMMERCIAL

## 2024-11-11 DIAGNOSIS — D32.0 CEREBRAL MENINGIOMA (MULTI): Primary | ICD-10-CM

## 2024-11-11 PROCEDURE — 99213 OFFICE O/P EST LOW 20 MIN: CPT | Mod: GC,95 | Performed by: NEUROLOGICAL SURGERY

## 2024-11-11 PROCEDURE — 99213 OFFICE O/P EST LOW 20 MIN: CPT | Performed by: NEUROLOGICAL SURGERY

## 2024-11-11 NOTE — PROGRESS NOTES
Sun Zimmerman is a 59 y.o. year old female    Visit Type: Virtual Visit - An interactive audio and video telecommunication system which permits real time communications between the patient (at the originating site) and the provider (at the distant site) was utilized to provide this telehealth service. Verbal consent for encounter: Verbal consent was requested and obtained from patient, or from parent/guardian if minor, on this date for a telehealth visit.      HPI    She is a right handed  59 y.o. year old female presenting for follow-up from prior gross total resection of meningioma, for her 3 month surveillance scan.     PMH HTN, HLD, ZEENAT, scleroderma (positive anti-SCL 70), chronic interstitial cystitis, GERD, fibromyalgia, chronic venous insufficiency, RLS, S/p Julio Cesar-en-Y bypass, who presented from OSH due to seizure receiving 1.5g Keppra. CTH with large area of edema and concern for left frontal underlying lesion. MRI Brain w/wo demonstrating a 2.7 x 2.9 x2.8cm L frontal lobe mass. CT C/A/P with 0.5cm pulmonary nodules throughout the left upper and lower lobe. Multinodular thyroid gland with thyroid nodule measuring 2.5 x 2.2cm She is now s/p L craniotomy for tumor resection on 06/18/24. JOSE for GTR. Final surgical pathology for microcystic meningioma WHO Grade 1.      Patient is doing well overall, although she was recently diagnosed with anemia and is getting further workup by her PCP. Her symptoms are mainly fatigue. Incision healing well. Otherwise, patient denies fevers, headaches, nausea, vomiting, speech difficulty, seizures, double/blurry vision, sensory loss, weakness, incontinence, pain.      Review of Systems   Constitutional:  Positive for fatigue.   All other systems reviewed and are negative.         Past Medical History:   Diagnosis Date    Delayed emergence from general anesthesia     Encounter for other preprocedural examination 05/25/2017    Pre-op testing    Encounter for preprocedural  respiratory examination 05/24/2017    Preop pulmonary/respiratory exam    GERD (gastroesophageal reflux disease)     Hypertension     Other conditions influencing health status 10/14/2016    Difficulty Breathing (Dyspnea)    Seizure disorder (Multi)     Vision loss        Past Surgical History:   Procedure Laterality Date    HYSTERECTOMY  06/12/2013    Hysterectomy    KNEE ARTHROSCOPY W/ DEBRIDEMENT  05/09/2013    Arthroscopy Knee Right    MR HEAD ANGIO WO IV CONTRAST  12/10/2015    MR HEAD ANGIO WO IV CONTRAST 12/10/2015 [facility] CLINICAL LEGACY    OTHER SURGICAL HISTORY  01/05/2022    Transobturator sling placement    OTHER SURGICAL HISTORY  01/09/2020    Colonoscopy           Current Outpatient Medications:     acetaminophen (Tylenol) 500 mg tablet, Take 1 tablet by mouth every 6 hours if needed for mild pain (1 - 3)., Disp: , Rfl:     dexAMETHasone (Decadron) 2 mg tablet, Take 2 tablets (4 mg) by mouth 4 times a day for 2 days, THEN 1.5 tablets (3 mg) 4 times a day for 2 days, THEN 1 tablet (2 mg) 4 times a day for 2 days, THEN 0.5 tablets (1 mg) 4 times a day for 2 days., Disp: 40 tablet, Rfl: 0    DULoxetine (Cymbalta) 60 mg DR capsule, Take 1 capsule (60 mg) by mouth once daily., Disp: 90 capsule, Rfl: 1    gabapentin (Neurontin) 600 mg tablet, Take 1 tablet (600 mg) by mouth 3 times a day., Disp: 90 tablet, Rfl: 1    hydrOXYzine HCL (Atarax) 25 mg tablet, TAKE 1 TABLET BY MOUTH EVERYDAY AT BEDTIME, Disp: 90 tablet, Rfl: 0    levETIRAcetam (Keppra) 500 mg tablet, Take 1 tablet (500 mg) by mouth 2 times a day., Disp: 180 tablet, Rfl: 3    naltrexone (Depade) 50 mg tablet, Take 1 tablet (50 mg) by mouth early in the morning.., Disp: , Rfl:     naproxen (Naprosyn) 500 mg tablet, Take 1 tablet (500 mg) by mouth 2 times a day after meals. Do not restart until follow up with Neurosurgery, Disp: , Rfl:     omeprazole (PriLOSEC) 40 mg DR capsule, TAKE 1 CAPSULE BY MOUTH EVERY DAY----MUST MAKE APPT, Disp: 90 capsule, Rfl:  3    oxyCODONE (Roxicodone) 5 mg immediate release tablet, Take 1 tablet (5 mg) by mouth every 6 hours if needed for severe pain (7 - 10) or moderate pain (4 - 6)., Disp: 28 tablet, Rfl: 0    propranolol LA (Inderal LA) 80 mg 24 hr capsule, Take 1 capsule (80 mg) by mouth once daily., Disp: 90 capsule, Rfl: 0    sennosides-docusate sodium (Loretta-Colace) 8.6-50 mg tablet, Take 2 tablets by mouth once daily. For constipation while taking pain medication Oxycodone, Disp: 14 tablet, Rfl: 0        Objective   There were no vitals filed for this visit.    Neurological Exam  Mental Status   Oriented to person, place, time and situation. Speech is normal. Language is fluent with no aphasia.        Relevant Results    Imaging Results: CT head wo IV contrast 06/18/2024    Narrative  Interpreted By:  Disha Hanks,  and Ifeoma Parry  STUDY:  CT HEAD WO IV CONTRAST;  6/18/2024 5:51 pm    INDICATION:  Signs/Symptoms:post op.    COMPARISON:  CT head 06/05/2024.    ACCESSION NUMBER(S):  [id number]    ORDERING CLINICIAN:  RENÉE JAVIER    TECHNIQUE:  Noncontrast axial CT scan of the head was performed. Angled reformats  in brain and bone windows were generated. The images were reviewed in  bone, brain, blood and soft tissue windows.    FINDINGS:  Patient is status post left frontal craniotomy for resection of an  extra-axial left frontal lesion. There is mild soft tissue swelling  and small hematoma overlying the craniectomy site.    Deep to the craniectomy site, there is an extra-axial air containing  hyperdense collection measuring up to 5 mm in maximal thickness.  There is a resection cavity in the left frontal lobe with trace  amount of postsurgical blood and few air locules. Small amount of  pneumocephalus overlying the left frontal lobe. There is ongoing  edema and effacement of the sulci in the left frontal region without  evidence of midline shift or herniation.  The ventricles and basal  cisterns are within  normal limits.    The gray-white differentiation is otherwise intact.    The calvarium is otherwise unremarkable.    Paranasal sinuses and mastoid air cells are clear.    Impression  Status post left frontal craniotomy for a mass resection. Small  amount of extra-axial air and hemorrhage underlying the craniotomy  site. There is a resection cavity in the left frontal lobe with small  amount of postsurgical blood and gas. Ongoing vasogenic edema and  mass effect within the left frontal region.    I personally reviewed the images/study and I agree with the findings  as stated by resident physician Dr. Sohan Etienne . This study  was interpreted at [place], [place].    MACRO:  None    Signed by: Disha Hanks 6/18/2024 7:40 PM  Dictation workstation:   [id number]      CT head wo IV contrast 06/05/2024    Narrative  Interpreted By:  Lisa Caruso,  STUDY:  CT HEAD WO IV CONTRAST;  6/5/2024 7:00 am    INDICATION:  Signs/Symptoms:seizure.    COMPARISON:  MRI brain 10 December 2015    ACCESSION NUMBER(S):  [id number]    ORDERING CLINICIAN:  LISA MORENO    TECHNIQUE:  CT of the brain from the skull vertex to the skull base, without  intravenous contrast    FINDINGS:  ACUTE INTRA-AXIAL HEMORRHAGE:  Negative    ACUTE EXTRA-AXIAL/SUBDURAL HEMORRHAGE:  Negative    ACUTE INTRACRANIAL MASS EFFECT:  No midline shift or basilar  herniation, but there is probably an element of gyral swelling and  sulcal effacement in the left frontal lobe around the area of  vasogenic edema described below    CT EVIDENCE OF ACUTE / SUBACUTE TERRITORIAL ISCHEMIA:  Negative    VENTRICLES:  Normal caliber and configuration    OTHER BRAIN FINDINGS:  Large area of vasogenic cerebral edema in  subcortical left frontal lobe. No such finding on MRI brain 10  December 2015    INCLUDED PARANASAL SINUSES: All clear    INCLUDED MASTOID AIR CELLS: All clear    SKULL:  No lytic or blastic lesion    EXTRACRANIAL  SOFT TISSUES:  Scalp and occular globes grossly normal  by CT    Impression  MRI brain without and with intravenous contrast recommended to  further evaluate a large area of vasogenic edema in the left frontal  lobe, was not present on the only pertinent comparison exam, MRI  brain 10 December 2015. An underlying mass may be present    No other acute intracranial process    No acute intracranial hemorrhage    No compelling CT evidence for large acute territorial infarct    No subdural collection    MACRO:  None    Signed by: Guero Caruso 6/5/2024 7:14 AM  Dictation workstation:   [id number]    MR brain w and wo IV contrast 11/01/2024    Narrative  Interpreted By:  Roly Driscoll,  STUDY:  MR BRAIN W AND WO IV CONTRAST;  11/1/2024 8:51 am    INDICATION:  Signs/Symptoms:followup MRI post meningioma resection.    ,D32.9 Benign neoplasm of meninges, unspecified    COMPARISON:  06/19/2024 brain MR    ACCESSION NUMBER(S):  [id number]    ORDERING CLINICIAN:  RENÉE JAVIER    TECHNIQUE:  Axial T2, FLAIR, DWI, gradient echo T2 and sagittal and coronal T1  weighted images of brain were acquired. Post contrast T1 weighted  images were acquired after administration of 27 ML Dotarem gadolinium  based intravenous contrast.    FINDINGS:  The previously noted left frontal lobe edema deep to the meningioma  resection site has significantly decreased. Small foci of cystic  encephalomalacia with thin band of surrounding gliosis remains along  the anterior margin of the precentral gyrus.    No mass or enhancement suggestive of residual meningioma is noted.  The medial postoperative changes in the adjacent scalp have resolved  as well.    No acute infarct, hemorrhage or mass is noted. The white matter has a  few nonspecific focal FLAIR hyperintensities, unchanged.    The ventricles and sulci are normal.    Impression  1. No residual/recurrent meningioma is noted.    2. Postoperative changes along the left frontal lobe in the region  of  the precentral gyrus have resolved with small residual foci of cystic  encephalomalacia and surrounding gliosis.    MACRO:  None    Signed by: Roly Driscoll 11/5/2024 7:34 AM  Dictation workstation:   [id number]      MR brain w and wo IV contrast 06/19/2024    Narrative  Interpreted By:  Jackelyn Mac and Ravi Shweta  STUDY:  MR BRAIN W AND WO IV CONTRAST;  6/19/2024 9:22 am    INDICATION:  Signs/Symptoms:post op.  Status post resection of left frontal  meningioma.    COMPARISON:  MRI brain 06/05/2024, CT head 06/18/2024    ACCESSION NUMBER(S):  [id number]    ORDERING CLINICIAN:  RENÉE JAVIER    TECHNIQUE:  Axial T2, FLAIR, DWI, gradient echo T2 and T1 weighted images of  brain were acquired. Post contrast T1 weighted images were acquired  after administration of 25 ML Dotarem gadolinium based intravenous  contrast. Multiplanar reconstructions were obtained of postcontrast  T1 images.    FINDINGS:  Status post frontal craniotomy for resection of left frontal  meningioma. Subgaleal fluid is noted extending along left calvarium  noted. Deep to the craniotomy site, there is trace extra-axial fluid  measuring 0.2 cm along the left cerebral hemisphere. Post-contrast  imaging demonstrates focal dural enhancement and slight thickening.    Within the perirolandic region involving the posterosuperior frontal  gyrus and pre and postcentral gyrus there is persistent T2/FLAIR  hyperintense signal favoring vasogenic edema from prior mass effect.  Minimal gyriform enhancement is noted along the anterior border of  the resection cavity which is nonspecific but most likely  postoperative. Susceptibility artifact within the region of resection  favors hemosiderin deposition.    No focal diffusion restriction abnormality is evident to suggest  acute infarct. No significant midline shift. Outside of the resection  cavity, the ventricles, sulci, and basal cisterns are unremarkable in  appearance.    T2 hyperintense and FLAIR  hypointense foci are present within the  basal ganglia favoring perivascular spaces.    Visualized vascular flow voids are unremarkable.    The paranasal sinuses and mastoid air cells unremarkable.    Impression  Expected postoperative appearance status post left frontal meningioma  resection. No evidence of residual tumor. No acute intracranial  abnormality.    I personally reviewed the images/study and I agree with the resident  findings as stated by Yaneth Jacobo MD. This study was interpreted at  [facility], [place], [place].    MACRO:  None    Signed by: Jackelyn Mac 6/19/2024 10:09 AM  Dictation workstation:   [id number]    No results found for this or any previous visit from the past 365 days.    Problem List Items Addressed This Visit       Cerebral meningioma (Multi) - Primary    Relevant Orders    MR brain w and wo IV contrast          Assessment/Plan   Diagnoses and all orders for this visit:  Cerebral meningioma (Multi)  -     MR brain w and wo IV contrast; Future    Patient is doing well at [facility] from gross total resection of grade I meningioma . Incision well-healed at this point.       Recommend repeat MRI in 1 year for surveillance imaging.     Will contact patient with MRI results at that time.

## 2024-11-12 ASSESSMENT — ENCOUNTER SYMPTOMS: FATIGUE: 1

## 2024-11-25 ENCOUNTER — TELEMEDICINE (OUTPATIENT)
Dept: NEUROLOGY | Facility: CLINIC | Age: 59
End: 2024-11-25
Payer: COMMERCIAL

## 2024-11-25 DIAGNOSIS — G93.89 BRAIN MASS: Primary | ICD-10-CM

## 2024-11-25 DIAGNOSIS — R56.9 SEIZURE (MULTI): ICD-10-CM

## 2024-11-25 PROCEDURE — 99442 PR PHYS/QHP TELEPHONE EVALUATION 11-20 MIN: CPT | Performed by: NURSE PRACTITIONER

## 2024-11-25 NOTE — PROGRESS NOTES
"59 y.o.female presenting in follow up for seizures.    4D CLASSIFICATION - EPILEPTIC Paroxysmal Episodes  Semiology: Hypermotor seizure  Onset: 6/3/2024  Frequency: 3x  Last event: 6/5/2024  EZ: Left frontal  Etiology: Structural (left frontal dural based brain mass)  Comorbidities: ZEENAT, fibromyalgia, RLS, OA, scleroderma    Workup:  EEG: (6/6/2024) Normal EEG  AMU: Has not had  CTH: (6/18/2024) S/p left frontal craniotomy with mass resection (cavity in left frontal lobe; small amount of postsurgical blood and gas; ongoing left frontal vasogenic edema and mass effect)  MRI w/wo: (6/19/2024) Post-op left frontal meningioma resecton  EKG-ND: (6/5/2024) ,     Prior ASMs: None  Current ASMs: LEV 500am (seizures), -600-600 (fibromyalgia)    Handedness: RIGHT  -------------------------------------------------    EPILEPSY HISTORY  In the spring of 2024, the patient had been having bad dreams, and apparently \"physically acting out her nightmares.\"  could generally calm her down. She'd also noticed some mild decreased function in her right arm, though nothing significant enough to warrant medical attention.     On June 3rd, 2024, she'd gone to bed feeling normal, but awoke with a tongue bite and feeling sore.  had said she'd had a particularly violent dream, as she was shaking a lot. She did state she felt different after this night's sleep, as if she were in a fog. She started to feel better as the day progressed. However, two nights later on 6/5/2024, she remembers waking up, and feeling her arms and legs thrashing about. Per , he was woken up when he heard patient scream and then witnessed her violently thrashing her bilateral arms asynchronously.  This lasted about 2 minutes.  Her eyes were open.  Her head was not turned towards one side. She did not lose control of her bowel or bladder, after the episode she was awake and could answer questions but was not completely back to " baseline.    He called EMS, and she was taken to [facility] ED. Underwent evaluation, and was found to have a left frontal meningioma. Transferred to [facility], started on LEV 9529-8520, and referred to  Neurosurgery (Sarah) for assessment. She underwent resection on 2024, and continued on LEV 3507-5135 post-op.    Since then, she has not had any further seizure activity. She's been taking and tolerating the LEV 2287-8959 without issues, and denies drowsiness, irritability, or any other symptoms. She was referred to  Epilepsy for determination of longterm medication continuation v wean.     EPILEPSY RISK FACTORS:  Gestation and Birth: 5 weeks early by   Febrile Seizures: No  Milestones: On time  CNS Infections: Denies  CNS Surgeries: Denies  Head Trauma: Denies  FHx of Seizures: Denies    Currently Driving?: NO  - Hx of MVA? Denies    REVIEW OF SYSTEMS  ROS: As per HPI. All other systems have been reviewed and are negative for complaint. No changes in the past medical, family, or social histories since the previous review on per the patient.    MEDICAL AND SURGICAL HISTORY  Past Medical History:   Diagnosis Date    Delayed emergence from general anesthesia     Encounter for other preprocedural examination 2017    Pre-op testing    Encounter for preprocedural respiratory examination 2017    Preop pulmonary/respiratory exam    GERD (gastroesophageal reflux disease)     Hypertension     Other conditions influencing health status 10/14/2016    Difficulty Breathing (Dyspnea)    Seizure disorder (Multi)     Vision loss      Past Surgical History:   Procedure Laterality Date    HYSTERECTOMY  2013    Hysterectomy    KNEE ARTHROSCOPY W/ DEBRIDEMENT  2013    Arthroscopy Knee Right    MR HEAD ANGIO WO IV CONTRAST  12/10/2015    MR HEAD ANGIO WO IV CONTRAST 12/10/2015 [facility] [place] CLINICAL LEGACY    OTHER SURGICAL HISTORY  2022    Transobturator sling placement    OTHER SURGICAL HISTORY   01/09/2020    Colonoscopy       FAMILY HISTORY  No family history on file.    SOCIAL HISTORY:  Relationships: , two daughters, grandson (Alec)  Education: Some college  Employment: [employer]   Tobacco: Denies  Alcohol: Occasional glass/craft  Marijuana: Denies  Drugs: Denies  Caffeine: 1 cup of coffee per day  Hobbies: Spending time with grandchildren, camping    ALLERGIES  No Known Allergies    MEDS  Current Outpatient Medications   Medication Instructions    acetaminophen (Tylenol) 500 mg tablet 1 tablet, oral, Every 6 hours PRN    dexAMETHasone (Decadron) 2 mg tablet Take 2 tablets (4 mg) by mouth 4 times a day for 2 days, THEN 1.5 tablets (3 mg) 4 times a day for 2 days, THEN 1 tablet (2 mg) 4 times a day for 2 days, THEN 0.5 tablets (1 mg) 4 times a day for 2 days.    DULoxetine (CYMBALTA) 60 mg, oral, Daily    gabapentin (NEURONTIN) 600 mg, oral, 3 times daily    hydrOXYzine HCL (ATARAX) 25 mg, oral, Nightly    levETIRAcetam (KEPPRA) 500 mg, oral, 2 times daily    naltrexone (Depade) 50 mg tablet 1 tablet, oral, Daily (0630)    naproxen (NAPROSYN) 500 mg, oral, 2 times daily after meals, Do not restart until follow up with Neurosurgery    omeprazole (PriLOSEC) 40 mg DR capsule TAKE 1 CAPSULE BY MOUTH EVERY DAY----MUST MAKE APPT    oxyCODONE (ROXICODONE) 5 mg, oral, Every 6 hours PRN    propranolol LA (INDERAL LA) 80 mg, oral, Daily    sennosides-docusate sodium (Loretta-Colace) 8.6-50 mg tablet 2 tablets, oral, Daily, For constipation while taking pain medication Oxycodone     EXAM  This examination was performed over telehealth by telephone discussion--Patient is alert and oriented. Speech is fluid, without dysarthria. Able to appropriately give information about past history and current events. Further objective neurological testing not possible given nature of phone interview.      ASSESSMENT AND PLAN  59 y.o.female presenting in seizure follow up.   Her semiology is described above.    Seizure free post-op, and taking/tolerating LEV 5923-8069. We had a long discussion regarding risks and benefits of continuing on the medication v weaning. She voiced concerns previosuly with  and had wanted to wait until her most recent MRI to completely wean off LEV.   Her recent MRI was stable. She has only been taking LEV 500mg in the morning since the appt with dr. Mcgill in September . No seizure recurrence, would like to finish weaning off keppra.   Seizure restrictions for time being, follow-up in 3-6 months.    - Decrease LEV to 250mg in the morning for 2 weeks then stop   instructed to reach off with any breakthrough seizure activity  - Seizure precautions (including no driving), follow-up in 3-6 months

## 2024-11-26 PROBLEM — R56.9 SEIZURE (MULTI): Status: ACTIVE | Noted: 2024-11-26

## 2024-11-30 ENCOUNTER — APPOINTMENT (OUTPATIENT)
Dept: RADIOLOGY | Facility: HOSPITAL | Age: 59
End: 2024-11-30
Payer: COMMERCIAL

## 2024-12-06 ENCOUNTER — APPOINTMENT (OUTPATIENT)
Dept: RADIOLOGY | Facility: HOSPITAL | Age: 59
End: 2024-12-06
Payer: COMMERCIAL

## 2024-12-11 DIAGNOSIS — N30.10 INTERSTITIAL CYSTITIS (CHRONIC) WITHOUT HEMATURIA: ICD-10-CM

## 2024-12-13 ENCOUNTER — APPOINTMENT (OUTPATIENT)
Dept: RADIOLOGY | Facility: HOSPITAL | Age: 59
End: 2024-12-13
Payer: COMMERCIAL

## 2024-12-17 NOTE — TELEPHONE ENCOUNTER
My Chart message sent to pt asking she make an appt before a refill can be sent for hydroxyzine since she was last seen in the office on 7/27/24.

## 2024-12-26 ENCOUNTER — TELEPHONE (OUTPATIENT)
Dept: OBSTETRICS AND GYNECOLOGY | Facility: CLINIC | Age: 59
End: 2024-12-26
Payer: COMMERCIAL

## 2024-12-26 NOTE — TELEPHONE ENCOUNTER
Left message asking pt to make pt appt before rx will be sent since she was last seen July 2023. Left voicemail with office phone number [phone] to call with further questions.

## 2025-01-16 ENCOUNTER — OFFICE VISIT (OUTPATIENT)
Dept: HEMATOLOGY/ONCOLOGY | Facility: HOSPITAL | Age: 60
End: 2025-01-16
Payer: COMMERCIAL

## 2025-01-16 ENCOUNTER — LAB (OUTPATIENT)
Dept: LAB | Facility: LAB | Age: 60
End: 2025-01-16
Payer: COMMERCIAL

## 2025-01-16 VITALS
HEART RATE: 65 BPM | SYSTOLIC BLOOD PRESSURE: 175 MMHG | HEIGHT: 65 IN | BODY MASS INDEX: 48.82 KG/M2 | RESPIRATION RATE: 16 BRPM | TEMPERATURE: 96.8 F | WEIGHT: 293 LBS | DIASTOLIC BLOOD PRESSURE: 101 MMHG | OXYGEN SATURATION: 98 %

## 2025-01-16 DIAGNOSIS — D50.9 IRON DEFICIENCY ANEMIA, UNSPECIFIED IRON DEFICIENCY ANEMIA TYPE: Primary | ICD-10-CM

## 2025-01-16 DIAGNOSIS — D50.9 IRON DEFICIENCY ANEMIA, UNSPECIFIED IRON DEFICIENCY ANEMIA TYPE: ICD-10-CM

## 2025-01-16 LAB
ALBUMIN SERPL BCP-MCNC: 4.3 G/DL (ref 3.4–5)
ALP SERPL-CCNC: 101 U/L (ref 33–136)
ALT SERPL W P-5'-P-CCNC: 34 U/L (ref 7–45)
ANION GAP SERPL CALC-SCNC: 12 MMOL/L (ref 10–20)
AST SERPL W P-5'-P-CCNC: 20 U/L (ref 9–39)
BASOPHILS # BLD AUTO: 0.06 X10*3/UL (ref 0–0.1)
BASOPHILS NFR BLD AUTO: 0.8 %
BILIRUB SERPL-MCNC: 0.5 MG/DL (ref 0–1.2)
BUN SERPL-MCNC: 20 MG/DL (ref 6–23)
CALCIUM SERPL-MCNC: 9.4 MG/DL (ref 8.6–10.3)
CHLORIDE SERPL-SCNC: 100 MMOL/L (ref 98–107)
CO2 SERPL-SCNC: 30 MMOL/L (ref 21–32)
CREAT SERPL-MCNC: 0.7 MG/DL (ref 0.5–1.05)
EGFRCR SERPLBLD CKD-EPI 2021: >90 ML/MIN/1.73M*2
EOSINOPHIL # BLD AUTO: 0.39 X10*3/UL (ref 0–0.7)
EOSINOPHIL NFR BLD AUTO: 4.9 %
ERYTHROCYTE [DISTWIDTH] IN BLOOD BY AUTOMATED COUNT: 20.4 % (ref 11.5–14.5)
FERRITIN SERPL-MCNC: 22 NG/ML (ref 8–150)
GLUCOSE SERPL-MCNC: 88 MG/DL (ref 74–99)
HCT VFR BLD AUTO: 41.8 % (ref 36–46)
HGB BLD-MCNC: 12.8 G/DL (ref 12–16)
IMM GRANULOCYTES # BLD AUTO: 0.02 X10*3/UL (ref 0–0.7)
IMM GRANULOCYTES NFR BLD AUTO: 0.3 % (ref 0–0.9)
IRON SATN MFR SERPL: ABNORMAL %
IRON SERPL-MCNC: 52 UG/DL (ref 35–150)
LYMPHOCYTES # BLD AUTO: 2.05 X10*3/UL (ref 1.2–4.8)
LYMPHOCYTES NFR BLD AUTO: 25.7 %
MCH RBC QN AUTO: 25.7 PG (ref 26–34)
MCHC RBC AUTO-ENTMCNC: 30.6 G/DL (ref 32–36)
MCV RBC AUTO: 84 FL (ref 80–100)
MONOCYTES # BLD AUTO: 0.59 X10*3/UL (ref 0.1–1)
MONOCYTES NFR BLD AUTO: 7.4 %
NEUTROPHILS # BLD AUTO: 4.86 X10*3/UL (ref 1.2–7.7)
NEUTROPHILS NFR BLD AUTO: 60.9 %
NRBC BLD-RTO: 0 /100 WBCS (ref 0–0)
PLATELET # BLD AUTO: 314 X10*3/UL (ref 150–450)
POTASSIUM SERPL-SCNC: 4.2 MMOL/L (ref 3.5–5.3)
PROT SERPL-MCNC: 7.1 G/DL (ref 6.4–8.2)
RBC # BLD AUTO: 4.98 X10*6/UL (ref 4–5.2)
RBC MORPH BLD: NORMAL
SODIUM SERPL-SCNC: 138 MMOL/L (ref 136–145)
TIBC SERPL-MCNC: ABNORMAL UG/DL
UIBC SERPL-MCNC: >450 UG/DL (ref 110–370)
WBC # BLD AUTO: 8 X10*3/UL (ref 4.4–11.3)

## 2025-01-16 PROCEDURE — 85025 COMPLETE CBC W/AUTO DIFF WBC: CPT

## 2025-01-16 PROCEDURE — 83540 ASSAY OF IRON: CPT

## 2025-01-16 PROCEDURE — 82728 ASSAY OF FERRITIN: CPT

## 2025-01-16 PROCEDURE — 83550 IRON BINDING TEST: CPT

## 2025-01-16 PROCEDURE — 3008F BODY MASS INDEX DOCD: CPT | Performed by: INTERNAL MEDICINE

## 2025-01-16 PROCEDURE — 82607 VITAMIN B-12: CPT

## 2025-01-16 PROCEDURE — 99214 OFFICE O/P EST MOD 30 MIN: CPT | Performed by: INTERNAL MEDICINE

## 2025-01-16 PROCEDURE — 80053 COMPREHEN METABOLIC PANEL: CPT

## 2025-01-16 ASSESSMENT — ENCOUNTER SYMPTOMS
SEIZURES: 0
RESPIRATORY NEGATIVE: 1
SPEECH DIFFICULTY: 0
OCCASIONAL FEELINGS OF UNSTEADINESS: 0
CARDIOVASCULAR NEGATIVE: 1
LOSS OF SENSATION IN FEET: 0
HEADACHES: 0
GASTROINTESTINAL NEGATIVE: 1
DEPRESSION: 0
FATIGUE: 1
UNEXPECTED WEIGHT CHANGE: 0
DIZZINESS: 1
FEVER: 0

## 2025-01-16 ASSESSMENT — LIFESTYLE VARIABLES
HOW OFTEN DO YOU HAVE A DRINK CONTAINING ALCOHOL: MONTHLY OR LESS
SKIP TO QUESTIONS 9-10: 1
HOW OFTEN DO YOU HAVE SIX OR MORE DRINKS ON ONE OCCASION: NEVER
AUDIT-C TOTAL SCORE: 1
HOW MANY STANDARD DRINKS CONTAINING ALCOHOL DO YOU HAVE ON A TYPICAL DAY: 1 OR 2

## 2025-01-16 ASSESSMENT — PATIENT HEALTH QUESTIONNAIRE - PHQ9
1. LITTLE INTEREST OR PLEASURE IN DOING THINGS: NOT AT ALL
SUM OF ALL RESPONSES TO PHQ9 QUESTIONS 1 AND 2: 0
2. FEELING DOWN, DEPRESSED OR HOPELESS: NOT AT ALL

## 2025-01-16 ASSESSMENT — COLUMBIA-SUICIDE SEVERITY RATING SCALE - C-SSRS
2. HAVE YOU ACTUALLY HAD ANY THOUGHTS OF KILLING YOURSELF?: NO
1. IN THE PAST MONTH, HAVE YOU WISHED YOU WERE DEAD OR WISHED YOU COULD GO TO SLEEP AND NOT WAKE UP?: NO
6. HAVE YOU EVER DONE ANYTHING, STARTED TO DO ANYTHING, OR PREPARED TO DO ANYTHING TO END YOUR LIFE?: NO

## 2025-01-16 ASSESSMENT — PAIN SCALES - GENERAL: PAINLEVEL_OUTOF10: 0-NO PAIN

## 2025-01-16 NOTE — PROGRESS NOTES
Patient ID: Sun Zimmerman is a 59 y.o. female.  Referring Physician: Sonam York MD  [address]  [address]  [place] [address]  Primary Care Provider: Sonam York MD  Referral Reason: Iron deficiency    Subjective:  Had meningioma removed in Jun e 2024 from L brain. Then started having hair loss and dizziness => Found to have low on iron in Nov 2024 => Started on PO iron. Feels better now.   Had gastric bypass in 2019 and hysterectomy in 2017. Denies seeing blood in stool or in urine. Last colonoscopy was in 2020.     Assessment/Plan:  ? Iron deficiency anemia: Iron sat was very low and HB was 10-11 in Nov 2024 => Has been on PO iron x 1 month now and fells better. Today, HB is 12.8 and iron indices are pending. If iron indices are better, will continue with PO iron x 5 more months With h/o gastric bypass, she may need IV iron though.     Likely cause of iron deficiency is decreased absorption from GI tract due to bypass surgery. Her iron indices were low even in 2022. Her last colonoscopy was in 2020 and she needs a repeat one. Will order that.     Hypertension: Today, her BP was 190/120. She is asymptomatic. Could be elevated b/o being in doctor's office. I asked her to check her BP at home daily x 1-2 weeks and see her PCP. YULIA. No neurological signs/symptoms.     Review Of Systems:  Review of Systems   Constitutional:  Positive for fatigue. Negative for fever and unexpected weight change.   HENT:  Negative.     Respiratory: Negative.     Cardiovascular: Negative.    Gastrointestinal: Negative.    Musculoskeletal:  Negative for gait problem.   Neurological:  Positive for dizziness. Negative for gait problem, headaches, seizures and speech difficulty.       Physical Exam:  BP (!) 175/101 (BP Location: Left arm, Patient Position: Sitting, BP Cuff Size: Adult long) Comment: recommended to see Dr. Triplett, PCP  Pulse 65   Temp 36 °C (96.8 °F) (Temporal)   Resp 16   Ht 1.651  "m (5' 5\")   Wt 133 kg (293 lb 14 oz)   LMP  (LMP Unknown)   SpO2 98%   BMI 48.90 kg/m²   BSA: 2.47 meters squared  Performance Status: Asymptomatic  Physical Exam  HENT:      Head: Normocephalic and atraumatic.   Eyes:      General: No scleral icterus.  Pulmonary:      Effort: Pulmonary effort is normal.   Musculoskeletal:         General: Normal range of motion.   Skin:     Coloration: Skin is not jaundiced.   Neurological:      General: No focal deficit present.      Mental Status: She is alert and oriented to person, place, and time. Mental status is at baseline.      Cranial Nerves: No cranial nerve deficit.      Sensory: No sensory deficit.         Results:  Diagnostic Results   Lab Results   Component Value Date    WBC 8.0 01/16/2025    HGB 12.8 01/16/2025    HCT 41.8 01/16/2025    MCV 84 01/16/2025     01/16/2025     Lab Results   Component Value Date    CALCIUM 8.7 11/01/2024     11/01/2024    K 4.5 11/01/2024    CO2 26 11/01/2024     11/01/2024    BUN 15 11/01/2024    CREATININE 0.80 11/01/2024    ALT 37 11/01/2024    AST 36 11/01/2024       Current Outpatient Medications:     DULoxetine (Cymbalta) 60 mg DR capsule, Take 1 capsule (60 mg) by mouth once daily., Disp: 90 capsule, Rfl: 1    gabapentin (Neurontin) 600 mg tablet, Take 1 tablet (600 mg) by mouth 3 times a day., Disp: 90 tablet, Rfl: 1    hydrOXYzine HCL (Atarax) 25 mg tablet, TAKE 1 TABLET BY MOUTH EVERYDAY AT BEDTIME, Disp: 90 tablet, Rfl: 0    levETIRAcetam (Keppra) 500 mg tablet, Take 1 tablet (500 mg) by mouth 2 times a day., Disp: 180 tablet, Rfl: 3    omeprazole (PriLOSEC) 40 mg DR capsule, TAKE 1 CAPSULE BY MOUTH EVERY DAY----MUST MAKE APPT, Disp: 90 capsule, Rfl: 3    propranolol LA (Inderal LA) 80 mg 24 hr capsule, Take 1 capsule (80 mg) by mouth once daily., Disp: 90 capsule, Rfl: 0     Past Surgical History:   Procedure Laterality Date    HYSTERECTOMY  06/12/2013    Hysterectomy    KNEE ARTHROSCOPY W/ DEBRIDEMENT "  05/09/2013    Arthroscopy Knee Right    MR HEAD ANGIO WO IV CONTRAST  12/10/2015    MR HEAD ANGIO WO IV CONTRAST 12/10/2015 [facility] CLINICAL LEGACY    OTHER SURGICAL HISTORY  01/05/2022    Transobturator sling placement    OTHER SURGICAL HISTORY  01/09/2020    Colonoscopy     No family history on file.   reports that she has never smoked. She has never used smokeless tobacco.  Social History     Socioeconomic History    Marital status:      Spouse name: Not on file    Number of children: Not on file    Years of education: Not on file    Highest education level: Not on file   Occupational History    Not on file   Tobacco Use    Smoking status: Never    Smokeless tobacco: Never   Vaping Use    Vaping status: Never Used   Substance and Sexual Activity    Alcohol use: Never    Drug use: Never    Sexual activity: Defer   Other Topics Concern    Not on file   Social History Narrative    Not on file     Social Drivers of Health     Financial Resource Strain: Not on file   Food Insecurity: Not on file   Transportation Needs: No Transportation Needs (7/9/2024)    OASIS : Transportation     Lack of Transportation (Medical): No     Lack of Transportation (Non-Medical): No     Patient Unable or Declines to Respond: No   Physical Activity: Not on file   Stress: Not on file   Social Connections: Feeling Socially Integrated (7/9/2024)    OASIS : Social Isolation     Frequency of experiencing loneliness or isolation: Never   Intimate Partner Violence: Not on file   Housing Stability: Not on file       Diagnoses and all orders for this visit:  Iron deficiency anemia, unspecified iron deficiency anemia type  -     Referral to Hematology and Oncology  -     CBC and Auto Differential; Future  -     Comprehensive Metabolic Panel; Future  -     Ferritin; Future  -     Vitamin B12; Future  -     Iron and TIBC; Future  -     Clinic Appointment Request; Future  -     CBC and Auto Differential; Future  -     Iron and TIBC;  Future  -     Ferritin; Future     Kajal Dominguez MD

## 2025-01-17 ENCOUNTER — PHARMACY VISIT (OUTPATIENT)
Dept: PHARMACY | Facility: CLINIC | Age: 60
End: 2025-01-17
Payer: MEDICARE

## 2025-01-17 ENCOUNTER — TELEPHONE (OUTPATIENT)
Dept: HEMATOLOGY/ONCOLOGY | Facility: HOSPITAL | Age: 60
End: 2025-01-17
Payer: COMMERCIAL

## 2025-01-17 DIAGNOSIS — N30.10 IC (INTERSTITIAL CYSTITIS): Primary | ICD-10-CM

## 2025-01-17 DIAGNOSIS — Z12.11 SPECIAL SCREENING FOR MALIGNANT NEOPLASMS, COLON: ICD-10-CM

## 2025-01-17 LAB — VIT B12 SERPL-MCNC: 254 PG/ML (ref 211–911)

## 2025-01-17 PROCEDURE — RXMED WILLOW AMBULATORY MEDICATION CHARGE

## 2025-01-17 RX ORDER — HYDROXYZINE HYDROCHLORIDE 25 MG/1
25 TABLET, FILM COATED ORAL NIGHTLY
Qty: 30 TABLET | Refills: 0 | Status: SHIPPED | OUTPATIENT
Start: 2025-01-17

## 2025-01-17 RX ORDER — SODIUM PICOSULFATE, MAGNESIUM OXIDE, AND ANHYDROUS CITRIC ACID 12; 3.5; 1 G/175ML; G/175ML; MG/175ML
LIQUID ORAL
Qty: 350 ML | Refills: 0 | Status: SHIPPED | OUTPATIENT
Start: 2025-01-17

## 2025-01-17 RX ORDER — HYDROXYZINE PAMOATE 25 MG/1
25 CAPSULE ORAL NIGHTLY
Qty: 30 CAPSULE | Refills: 3 | Status: SHIPPED | OUTPATIENT
Start: 2025-01-17 | End: 2025-02-16

## 2025-01-17 NOTE — TELEPHONE ENCOUNTER
Iron is improving. Needs to continue PO iron for 5 more months please let her know per Dr. De La Garza staff message.       Contacted patient, notified her iron level is improving, instructed to continue her joi iron for 5 more months.

## 2025-01-17 NOTE — TELEPHONE ENCOUNTER
Request received for   Requested Prescriptions     Pending Prescriptions Disp Refills    hydrOXYzine HCL (Atarax) 25 mg tablet [Pharmacy Med Name: HYDROXYZINE HCL 25 MG TABLET] 90 tablet 0     Sig: TAKE 1 TABLET BY MOUTH EVERYDAY AT BEDTIME       Sun Zimmerman was last seen 7/27/2023 and has an appt for 2/13/2025

## 2025-01-21 ENCOUNTER — APPOINTMENT (OUTPATIENT)
Dept: ORTHOPEDIC SURGERY | Facility: CLINIC | Age: 60
End: 2025-01-21
Payer: COMMERCIAL

## 2025-01-27 ENCOUNTER — APPOINTMENT (OUTPATIENT)
Dept: ORTHOPEDIC SURGERY | Facility: CLINIC | Age: 60
End: 2025-01-27
Payer: COMMERCIAL

## 2025-01-28 ENCOUNTER — TELEPHONE (OUTPATIENT)
Dept: RADIOLOGY | Facility: HOSPITAL | Age: 60
End: 2025-01-28
Payer: COMMERCIAL

## 2025-01-28 NOTE — TELEPHONE ENCOUNTER
Call to Sun regarding an incidental thyroid nodule found on imaging 6/5/20224. Thyroid ultrasound order was placed by Dr. Sonam Pena. Thyroid ultrasound is scheduled for Tuesday 2/4/2025 with an arrival of 3:00 pm and testing at 3:15 pm at [facility]. Discussed that there is no prep needed for this exam.

## 2025-02-03 ENCOUNTER — APPOINTMENT (OUTPATIENT)
Dept: ORTHOPEDIC SURGERY | Facility: CLINIC | Age: 60
End: 2025-02-03
Payer: COMMERCIAL

## 2025-02-03 ENCOUNTER — APPOINTMENT (OUTPATIENT)
Dept: HEMATOLOGY/ONCOLOGY | Facility: CLINIC | Age: 60
End: 2025-02-03
Payer: COMMERCIAL

## 2025-02-04 ENCOUNTER — HOSPITAL ENCOUNTER (OUTPATIENT)
Dept: RADIOLOGY | Facility: HOSPITAL | Age: 60
Discharge: HOME | End: 2025-02-04
Payer: COMMERCIAL

## 2025-02-04 DIAGNOSIS — E04.1 THYROID NODULE: ICD-10-CM

## 2025-02-04 PROCEDURE — 76536 US EXAM OF HEAD AND NECK: CPT | Performed by: RADIOLOGY

## 2025-02-04 PROCEDURE — 76536 US EXAM OF HEAD AND NECK: CPT

## 2025-02-10 ENCOUNTER — APPOINTMENT (OUTPATIENT)
Dept: ORTHOPEDIC SURGERY | Facility: CLINIC | Age: 60
End: 2025-02-10
Payer: COMMERCIAL

## 2025-02-12 NOTE — PROGRESS NOTES
Urogynecology  Provider:  Oralia Mcmahan MD  [phone]              ASSESSMENT AND PLAN:     Problem List Items Addressed This Visit    None          I spent a total of {eConsult Time:25004} in face to face and non face to face time.        Oralia Mcmahan MD        HISTORY OF PRESENT ILLNESS:     Last visit 7/27/2023 with Brenden  Diagnoses:   #1 Mixed urinary incontinence, urge and stress  #2 Insensory urine loss   #3 Interstitial cystitis      Plan:   1. Mixed urinary incontinence with urge and stress, insensory urine loss  - PVR = 5ccs by bladder U/S.  - Endorses mixed incontinence with stress and urge as well as insensory urine loss since her TVT with Dr. Pacheco in 2017. Hx of TOT sling in 2014 for OSCAR with Dr. Pacheco.   - Plan to have her do a Pyridium pad test to evaluate if she is leaking urine (insensory urine loss) vs. discharge vs. sweat.   - if positive will get UDS and consider cysto     2. IC  - Continue taking Atarax 25mg 1 pill po qhs for management of IC.     Never had UDN testing         Record Review:     Prolapse Symptoms :     Urinary Symptoms:     Bowel Symptoms:     Sexual Activity:          Past Medical History:      Past Medical History:   Diagnosis Date    Delayed emergence from general anesthesia     Encounter for other preprocedural examination 05/25/2017    Pre-op testing    Encounter for preprocedural respiratory examination 05/24/2017    Preop pulmonary/respiratory exam    GERD (gastroesophageal reflux disease)     Hypertension     Other conditions influencing health status 10/14/2016    Difficulty Breathing (Dyspnea)    Seizure disorder (Multi)     Vision loss           Past Surgical History:       Past Surgical History:   Procedure Laterality Date    HYSTERECTOMY  06/12/2013    Hysterectomy    KNEE ARTHROSCOPY W/ DEBRIDEMENT  05/09/2013    Arthroscopy Knee Right    MR HEAD ANGIO WO IV CONTRAST  12/10/2015    MR HEAD ANGIO WO IV CONTRAST 12/10/2015 [facility] CLINICAL LEGACY     OTHER SURGICAL HISTORY  01/05/2022    Transobturator sling placement    OTHER SURGICAL HISTORY  01/09/2020    Colonoscopy         Medications:       Prior to Admission medications    Medication Sig Start Date End Date Taking? Authorizing Provider   DULoxetine (Cymbalta) 60 mg DR capsule Take 1 capsule (60 mg) by mouth once daily. 5/16/24   Sonam Pena MD   gabapentin (Neurontin) 600 mg tablet Take 1 tablet (600 mg) by mouth 3 times a day. 11/1/24   Stefany Jensen MD   hydrOXYzine HCL (Atarax) 25 mg tablet TAKE 1 TABLET BY MOUTH EVERYDAY AT BEDTIME 1/17/25   Oralia Mcmahan MD   hydrOXYzine pamoate (VistariL) 25 mg capsule Take 1 capsule (25 mg) by mouth once daily at bedtime. 1/17/25 2/16/25  Oralia Mcmahan MD   levETIRAcetam (Keppra) 500 mg tablet Take 1 tablet (500 mg) by mouth 2 times a day. 9/25/24 9/25/25  Fran Mcgill MD   omeprazole (PriLOSEC) 40 mg DR capsule TAKE 1 CAPSULE BY MOUTH EVERY DAY----MUST MAKE APPT 4/12/24   Sonam Pena MD   propranolol LA (Inderal LA) 80 mg 24 hr capsule Take 1 capsule (80 mg) by mouth once daily. 5/14/24   Sonam Pena MD   sod picosulf-mag ox-citric ac (Clenpiq) 10 mg-3.5 gram- 12 gram/175 mL solution Take one bottle twice as directed by the prep instructions 1/17/25   Lamont Ely MD        ROS  Review of Systems     Blood, Urine   Date Value Ref Range Status   08/25/2022 SMALL(1+) (A) NEGATIVE Final     Nitrite, Urine   Date Value Ref Range Status   08/25/2022 NEGATIVE NEGATIVE Final     Urobilinogen, Urine   Date Value Ref Range Status   08/25/2022 <2.0 0.0 - 1.9 mg/dL Final         PHYSICAL EXAM:      LMP  (LMP Unknown)      No LMP recorded (lmp unknown). Patient is postmenopausal.      Declines chaperone for physical exam.      Well developed, well nourished, in no apparent distress.   Neurologic/Psychiatric:  Awake, Alert and Oriented times 3.  Affect normal.     GENITAL/URINARY:       External Genitalia:  The  patient has normal appearing external genitalia, normal skenes and bartholins glands, and a normal hair distribution.  Her vulva is without lesions, erythema or discharge.  It is non-tender with appropriate sensation.     Urethral Meatus:  Size normal, Location normal, Lesions absent, Prolapse absent,      Urethra:  Fullness absent, Masses absent,      Bladder:  Fullness absent, Masses absent, Tenderness absent,      Vagina:  General appearance normal, Estrogen effect normal, Discharge absent, Lesions absent, ***     Cervix: Normal, no discharge.   Uterus:  {UTERUS, EXAM:08677}  Adnexa: {Adnexa:65182}     Anus/Perineum:  Lesions absent and Masses absent {Exam; anus:33583}  {Exam; anus and perineum:96650}    Stress urinary incontinence *** demonstrable.         POP-Q:  Stage: {NUMBERS 0-4:98905}  Position: {SITTING STANDIN}    Aa: ***       Ba: *** C: ***   Gh: *** Pb: *** TVL: ***         Ap: *** Bp: *** D: ***               Data and DIAGNOSTIC STUDIES REVIEWED   US thyroid    Result Date: 2025  Interpreted By:  Scarlet Perez, STUDY: US THYROID;  2025 3:13 pm   INDICATION: Signs/Symptoms:thyroid nodules incidental  CT .   COMPARISON: None.   ACCESSION NUMBER(S): [id number]   ORDERING CLINICIAN: SHAYNE LOVETT   TECHNIQUE: Multiple ultrasonographic images of the thyroid gland were obtained.   FINDINGS:     RIGHT LOBE: 5.3 x 2.3 x 1.8 cm.   LEFT LOBE: 4.7 x 2 x 2.2 cm. Lower pole TR 3 nodule measuring 1.7 x 1 x 1.9 cm. There are 2 subcentimeter TR 1 nodules also noted.   ISTHMUS: 0.4 cm.       Left-sided TR 3 thyroid for which follow-up is recommended per ACR recommendations.   Please note that these statements are based on the recommendations of the American College of Radiology   TI-RADS grading system. ACR TI-RADS recommendations (apply to nodules which have NOT been biopsied):   TR5 (?7 points) highly suspicious - FNA if ? 1cm, follow-up if 0.5 -0.9 cm every year for 5 years.  "Aggregate cancer risk 35%. TR4 (4-6 points) moderately suspicious - FNA if ? 1.5cm, follow-up if 1 -1.4 cm in 1, 2, 3 and 5 years. Aggregate cancer risk 9.1% TR3 (3 points) mildly suspicious - FNA if ? 2.5cm, follow-up if 1.5 -2.4 cm in 1, 3 and 5 years. Aggregate cancer risk 4.8% TR2 (2 points) not suspicious. No FNA or follow-up.Aggregate cancer risk 1.5% TR1 (0 points) benign - No FNA or follow-up. Aggregate cancer risk 0.3%   Signed by: Scarlet Perez 2/5/2025 10:20 PM Dictation workstation:   [id number]     No results found for: \"URINECULTURE\", \"UAMICCOMM\"   Lab Results   Component Value Date    GLUCOSE 88 01/16/2025    CALCIUM 9.4 01/16/2025     01/16/2025    K 4.2 01/16/2025    CO2 30 01/16/2025     01/16/2025    BUN 20 01/16/2025    CREATININE 0.70 01/16/2025     Lab Results   Component Value Date    WBC 8.0 01/16/2025    HGB 12.8 01/16/2025    HCT 41.8 01/16/2025    MCV 84 01/16/2025     01/16/2025          Oralia Mcmahan MD        "

## 2025-02-13 ENCOUNTER — APPOINTMENT (OUTPATIENT)
Dept: OBSTETRICS AND GYNECOLOGY | Facility: CLINIC | Age: 60
End: 2025-02-13
Payer: COMMERCIAL

## 2025-02-17 ENCOUNTER — APPOINTMENT (OUTPATIENT)
Dept: ORTHOPEDIC SURGERY | Facility: CLINIC | Age: 60
End: 2025-02-17
Payer: COMMERCIAL

## 2025-02-18 ENCOUNTER — APPOINTMENT (OUTPATIENT)
Dept: RHEUMATOLOGY | Facility: CLINIC | Age: 60
End: 2025-02-18
Payer: COMMERCIAL

## 2025-02-25 ENCOUNTER — APPOINTMENT (OUTPATIENT)
Dept: ORTHOPEDIC SURGERY | Facility: CLINIC | Age: 60
End: 2025-02-25
Payer: COMMERCIAL

## 2025-02-27 ENCOUNTER — APPOINTMENT (OUTPATIENT)
Dept: ORTHOPEDIC SURGERY | Facility: CLINIC | Age: 60
End: 2025-02-27
Payer: COMMERCIAL

## 2025-03-04 ENCOUNTER — APPOINTMENT (OUTPATIENT)
Dept: ORTHOPEDIC SURGERY | Facility: CLINIC | Age: 60
End: 2025-03-04
Payer: COMMERCIAL

## 2025-03-04 DIAGNOSIS — M17.12 ARTHRITIS OF LEFT KNEE: Primary | ICD-10-CM

## 2025-03-07 ENCOUNTER — TELEPHONE (OUTPATIENT)
Dept: HEMATOLOGY/ONCOLOGY | Facility: HOSPITAL | Age: 60
End: 2025-03-07
Payer: COMMERCIAL

## 2025-03-07 NOTE — TELEPHONE ENCOUNTER
Patient returned phone call and is agreeable to reschedule appointment. Rescheduled to Wednes, 5/21/25 at 2:40 PM. Patient verbalized understanding and agreement regarding discussed information via verbal feedback.

## 2025-03-07 NOTE — TELEPHONE ENCOUNTER
Patient scheduled for MD follow up with Dr. De La Garza on Thurs, 5/22/25. Due to a change in provider's schedule, this appointment needs to be rescheduled. Attempted to reach patient to reschedule. Left detailed message, with call back number, for patient to call back at earliest convenience.

## 2025-03-11 ENCOUNTER — APPOINTMENT (OUTPATIENT)
Dept: ORTHOPEDIC SURGERY | Facility: CLINIC | Age: 60
End: 2025-03-11
Payer: COMMERCIAL

## 2025-04-10 ENCOUNTER — APPOINTMENT (OUTPATIENT)
Dept: OBSTETRICS AND GYNECOLOGY | Facility: CLINIC | Age: 60
End: 2025-04-10
Payer: COMMERCIAL

## 2025-04-15 ENCOUNTER — APPOINTMENT (OUTPATIENT)
Dept: RHEUMATOLOGY | Facility: CLINIC | Age: 60
End: 2025-04-15
Payer: COMMERCIAL

## 2025-04-16 ENCOUNTER — APPOINTMENT (OUTPATIENT)
Dept: RHEUMATOLOGY | Facility: CLINIC | Age: 60
End: 2025-04-16
Payer: COMMERCIAL

## 2025-04-28 ENCOUNTER — ANESTHESIA EVENT (OUTPATIENT)
Dept: GASTROENTEROLOGY | Facility: HOSPITAL | Age: 60
End: 2025-04-28
Payer: COMMERCIAL

## 2025-04-28 PROBLEM — R91.8 PULMONARY NODULES: Status: ACTIVE | Noted: 2025-04-28

## 2025-04-28 PROBLEM — I10 HTN (HYPERTENSION): Status: ACTIVE | Noted: 2025-04-28

## 2025-04-28 PROBLEM — M79.7 FIBROMYALGIA: Status: ACTIVE | Noted: 2025-04-28

## 2025-04-28 PROBLEM — G25.81 RLS (RESTLESS LEGS SYNDROME): Status: ACTIVE | Noted: 2025-04-28

## 2025-04-28 PROBLEM — T88.59XA DELAYED EMERGENCE FROM ANESTHESIA: Status: ACTIVE | Noted: 2025-04-28

## 2025-04-28 PROBLEM — E04.1 THYROID NODULE: Status: ACTIVE | Noted: 2025-04-28

## 2025-04-28 PROBLEM — M34.9 SCLERODERMA (MULTI): Status: ACTIVE | Noted: 2025-04-28

## 2025-04-28 PROBLEM — E78.5 HYPERLIPIDEMIA: Status: ACTIVE | Noted: 2025-04-28

## 2025-04-28 PROBLEM — G47.33 OSA (OBSTRUCTIVE SLEEP APNEA): Status: ACTIVE | Noted: 2025-04-28

## 2025-04-28 PROBLEM — E66.813 CLASS 3 SEVERE OBESITY IN ADULT: Status: ACTIVE | Noted: 2025-04-28

## 2025-04-28 NOTE — ANESTHESIA PREPROCEDURE EVALUATION
Patient: Sun Zimmerman    Procedure Information       Date/Time: 05/13/25 0800    Scheduled providers: Lamont Ely MD    Procedure: COLONOSCOPY    Location: [facility]            Relevant Problems   Anesthesia   (+) Delayed emergence from anesthesia   (+) Delayed emergence from general anesthesia      Cardiac   (+) HTN (hypertension)   (+) Hyperlipidemia      Pulmonary   (+) ZEENAT (obstructive sleep apnea)   (+) Pulmonary nodules      Neuro   (+) Brain mass (S/p surgery)   (+) Seizure (Multi)      GI   (+) GERD (gastroesophageal reflux disease)      /Renal (within normal limits)      Liver (within normal limits)      Endocrine   (+) Class 3 severe obesity in adult   (+) Thyroid nodule      Hematology (within normal limits)      Musculoskeletal   (+) Fibromyalgia      HEENT (within normal limits)      ID (within normal limits)      Skin (within normal limits)      GYN (within normal limits)      Nervous   (+) Cerebral meningioma (Multi)   (+) RLS (restless legs syndrome)     There were no vitals filed for this visit.    Surgical History[1]  Medical History[2]  Current Medications[3]  Prior to Admission medications    Medication Sig Start Date End Date Taking? Authorizing Provider   DULoxetine (Cymbalta) 60 mg DR capsule Take 1 capsule (60 mg) by mouth once daily. 5/16/24   Sonam Pena MD   gabapentin (Neurontin) 600 mg tablet Take 1 tablet (600 mg) by mouth 3 times a day. 11/1/24   Stefany Jensen MD   hydrOXYzine HCL (Atarax) 25 mg tablet TAKE 1 TABLET BY MOUTH EVERYDAY AT BEDTIME 1/17/25   Oralia Mcmahan MD   hydrOXYzine pamoate (VistariL) 25 mg capsule Take 1 capsule (25 mg) by mouth once daily at bedtime. 1/17/25 2/16/25  Oralia Mcmahan MD   levETIRAcetam (Keppra) 500 mg tablet Take 1 tablet (500 mg) by mouth 2 times a day. 9/25/24 9/25/25  Fran Mcgill MD   omeprazole (PriLOSEC) 40 mg DR capsule TAKE 1 CAPSULE BY MOUTH EVERY DAY----MUST MAKE APPT 4/12/24    Sonam Pena MD   propranolol LA (Inderal LA) 80 mg 24 hr capsule Take 1 capsule (80 mg) by mouth once daily. 5/14/24   Sonam Pena MD   sod picosulf-mag ox-citric ac (Clenpiq) 10 mg-3.5 gram- 12 gram/175 mL solution Take one bottle twice as directed by the prep instructions 1/17/25   Lamont Ely MD     RX Allergies[4]  Social History     Tobacco Use    Smoking status: Never    Smokeless tobacco: Never   Substance Use Topics    Alcohol use: Never         Chemistry    Lab Results   Component Value Date/Time     01/16/2025 1518    K 4.2 01/16/2025 1518     01/16/2025 1518    CO2 30 01/16/2025 1518    BUN 20 01/16/2025 1518    CREATININE 0.70 01/16/2025 1518    Lab Results   Component Value Date/Time    CALCIUM 9.4 01/16/2025 1518    ALKPHOS 101 01/16/2025 1518    AST 20 01/16/2025 1518    ALT 34 01/16/2025 1518    BILITOT 0.5 01/16/2025 1518          Lab Results   Component Value Date/Time    WBC 8.0 01/16/2025 1518    HGB 12.8 01/16/2025 1518    HCT 41.8 01/16/2025 1518     01/16/2025 1518     Lab Results   Component Value Date/Time    PROTIME 10.6 06/05/2024 1137    INR 0.9 06/05/2024 1137     No results found for this or any previous visit (from the past 4464 hours).  No results found for this or any previous visit from the past 1095 days.    Clinical information reviewed:                 Chart reviewed.  No clearances ordered.    NPO Detail:  No data recorded     Physical Exam    Airway  Mallampati: IV  TM distance: >3 FB  Neck ROM: full  Mouth opening: 3 or more finger widths     Cardiovascular - normal exam   Dental - normal exam     Pulmonary - normal exam   Abdominal (+) obese             Anesthesia Plan    History of general anesthesia?: yes  History of complications of general anesthesia?: no    ASA 3     MAC     The patient is not a current smoker.  Education provided regarding risk of obstructive sleep apnea.  intravenous induction   Anesthetic plan and  risks discussed with patient.  Use of blood products discussed with patient who.    Plan discussed with attending.             [1]   Past Surgical History:  Procedure Laterality Date    HYSTERECTOMY  06/12/2013    Hysterectomy    KNEE ARTHROSCOPY W/ DEBRIDEMENT  05/09/2013    Arthroscopy Knee Right    MR HEAD ANGIO WO IV CONTRAST  12/10/2015    MR HEAD ANGIO WO IV CONTRAST 12/10/2015 [facility] CLINICAL LEGACY    OTHER SURGICAL HISTORY  01/05/2022    Transobturator sling placement    OTHER SURGICAL HISTORY  01/09/2020    Colonoscopy   [2]   Past Medical History:  Diagnosis Date    Delayed emergence from general anesthesia     Encounter for other preprocedural examination 05/25/2017    Pre-op testing    Encounter for preprocedural respiratory examination 05/24/2017    Preop pulmonary/respiratory exam    GERD (gastroesophageal reflux disease)     Hypertension     Other conditions influencing health status 10/14/2016    Difficulty Breathing (Dyspnea)    Seizure disorder (Multi)     Vision loss    [3]   Current Outpatient Medications:     DULoxetine (Cymbalta) 60 mg DR capsule, Take 1 capsule (60 mg) by mouth once daily., Disp: 90 capsule, Rfl: 1    gabapentin (Neurontin) 600 mg tablet, Take 1 tablet (600 mg) by mouth 3 times a day., Disp: 90 tablet, Rfl: 1    hydrOXYzine HCL (Atarax) 25 mg tablet, TAKE 1 TABLET BY MOUTH EVERYDAY AT BEDTIME, Disp: 30 tablet, Rfl: 0    hydrOXYzine pamoate (VistariL) 25 mg capsule, Take 1 capsule (25 mg) by mouth once daily at bedtime., Disp: 30 capsule, Rfl: 3    levETIRAcetam (Keppra) 500 mg tablet, Take 1 tablet (500 mg) by mouth 2 times a day., Disp: 180 tablet, Rfl: 3    omeprazole (PriLOSEC) 40 mg DR capsule, TAKE 1 CAPSULE BY MOUTH EVERY DAY----MUST MAKE APPT, Disp: 90 capsule, Rfl: 3    propranolol LA (Inderal LA) 80 mg 24 hr capsule, Take 1 capsule (80 mg) by mouth once daily., Disp: 90 capsule, Rfl: 0    sod picosulf-mag ox-citric ac (Clenpiq) 10 mg-3.5 gram- 12 gram/175 mL solution,  Take one bottle twice as directed by the prep instructions, Disp: 350 mL, Rfl: 0  [4] No Known Allergies

## 2025-04-29 ENCOUNTER — PRE-ADMISSION TESTING (OUTPATIENT)
Dept: PREADMISSION TESTING | Facility: HOSPITAL | Age: 60
End: 2025-04-29
Payer: COMMERCIAL

## 2025-04-29 VITALS — WEIGHT: 280 LBS | BODY MASS INDEX: 46.65 KG/M2 | HEIGHT: 65 IN

## 2025-04-29 ASSESSMENT — DUKE ACTIVITY SCORE INDEX (DASI)
CAN YOU DO YARD WORK LIKE RAKING LEAVES, WEEDING OR PUSHING A MOWER: YES
CAN YOU PARTICIPATE IN STRENOUS SPORTS LIKE SWIMMING, SINGLES TENNIS, FOOTBALL, BASKETBALL, OR SKIING: NO
CAN YOU RUN A SHORT DISTANCE: NO
CAN YOU PARTICIPATE IN MODERATE RECREATIONAL ACTIVITIES LIKE GOLF, BOWLING, DANCING, DOUBLES TENNIS OR THROWING A BASEBALL OR FOOTBALL: YES
CAN YOU DO LIGHT WORK AROUND THE HOUSE LIKE DUSTING OR WASHING DISHES: YES
CAN YOU CLIMB A FLIGHT OF STAIRS OR WALK UP A HILL: YES
CAN YOU TAKE CARE OF YOURSELF (EAT, DRESS, BATHE, OR USE TOILET): YES
CAN YOU DO HEAVY WORK AROUND THE HOUSE LIKE SCRUBBING FLOORS OR LIFTING AND MOVING HEAVY FURNITURE: YES
CAN YOU WALK A BLOCK OR TWO ON LEVEL GROUND: YES
CAN YOU WALK INDOORS, SUCH AS AROUND YOUR HOUSE: YES
CAN YOU DO MODERATE WORK AROUND THE HOUSE LIKE VACUUMING, SWEEPING FLOORS OR CARRYING GROCERIES: YES

## 2025-04-29 NOTE — PREPROCEDURE INSTRUCTIONS
Medication List            Accurate as of April 29, 2025  8:28 AM. Always use your most recent med list.                Clenpiq 10 mg-3.5 gram- 12 gram/175 mL solution  Generic drug: sod picosulf-mag ox-citric ac  Take one bottle twice as directed by the prep instructions  Medication Adjustments for Surgery: Take/Use as prescribed     DULoxetine 60 mg DR capsule  Commonly known as: Cymbalta  Take 1 capsule (60 mg) by mouth once daily.  Medication Adjustments for Surgery: Take/Use as prescribed     gabapentin 600 mg tablet  Commonly known as: Neurontin  Take 1 tablet (600 mg) by mouth 3 times a day.  Medication Adjustments for Surgery: Take/Use as prescribed     hydrOXYzine HCL 25 mg tablet  Commonly known as: Atarax  TAKE 1 TABLET BY MOUTH EVERYDAY AT BEDTIME  Medication Adjustments for Surgery: Take/Use as prescribed     hydrOXYzine pamoate 25 mg capsule  Commonly known as: VistariL  Take 1 capsule (25 mg) by mouth once daily at bedtime.  Medication Adjustments for Surgery: Take/Use as prescribed     levETIRAcetam 500 mg tablet  Commonly known as: Keppra  Take 1 tablet (500 mg) by mouth 2 times a day.  Additional Medication Adjustments for Surgery: Other (Comment)     omeprazole 40 mg DR capsule  Commonly known as: PriLOSEC  TAKE 1 CAPSULE BY MOUTH EVERY DAY----MUST MAKE APPT  Medication Adjustments for Surgery: Take/Use as prescribed     propranolol LA 80 mg 24 hr capsule  Commonly known as: Inderal LA  Take 1 capsule (80 mg) by mouth once daily.  Medication Adjustments for Surgery: Take on the morning of surgery              Per pt- Not taking Levetiracetam.                NPO Instructions:    Follow instructions:  5 days before your procedure eat a low fiber diet. No seeds, nuts, beans, raw veggies, corn, popcorn or whole grains.  Day before procedure-may have light breakfast by 9am (ex. 1 egg, 1 piece of toast).  Then CLEAR LIQUIDS ONLY-No dairy products, nothing red/purple.  Take first part of prep- Evening  Before Procedure.  Drink lots of liquids.  Day of Procedure- 3-4am Take Second Part of Prep.    After Midnight the only Clear Liquid allowed is: Water, Black Coffee, Tea, Gatorade and Clear Soda.  STOP DRINKING 3 HOURS PRIOR TO PROCEDURE.  Take medications as instructed.   No Gum, Candy, Mints or Smoking day of the procedure!     Additional Instructions:     Review your medication instructions, take indicated medications  Wear  comfortable loose fitting clothing  All jewelry and valuables should be left at home    Park in back of hospital by ER. Come up to Second floor-Outpt dept to check in.  Bring Photo ID and Insurance card,   You MUST have a  with you.  No more than 2 visitors with you please.  There is construction around the hospital- best to take [address] [facility] to the hospital.     If you get ill at all before your procedure- CALL YOUR DOCTOR/SURGEON.  We want you in the best shape that is possible. Any sickness might lead to your procedure being delayed.      Call Outpatient dept at [phone] the night before your procedure (Friday for Monday procedure), between 1-3 pm.     **If you start any new medications, especially for weight loss or antibiotics-let us know. Outpatient dept number is [phone] (M-F 7-3:30p).

## 2025-05-07 DIAGNOSIS — M79.7 FIBROMYALGIA: ICD-10-CM

## 2025-05-08 RX ORDER — GABAPENTIN 600 MG/1
600 TABLET ORAL 2 TIMES DAILY
Qty: 180 TABLET | Refills: 0 | Status: SHIPPED | OUTPATIENT
Start: 2025-05-08

## 2025-05-13 ENCOUNTER — ANESTHESIA (OUTPATIENT)
Dept: GASTROENTEROLOGY | Facility: HOSPITAL | Age: 60
End: 2025-05-13
Payer: COMMERCIAL

## 2025-05-13 ENCOUNTER — HOSPITAL ENCOUNTER (OUTPATIENT)
Dept: GASTROENTEROLOGY | Facility: HOSPITAL | Age: 60
Discharge: HOME | End: 2025-05-13
Payer: COMMERCIAL

## 2025-05-13 VITALS
HEIGHT: 65 IN | BODY MASS INDEX: 46.65 KG/M2 | RESPIRATION RATE: 17 BRPM | SYSTOLIC BLOOD PRESSURE: 147 MMHG | TEMPERATURE: 97.2 F | DIASTOLIC BLOOD PRESSURE: 83 MMHG | HEART RATE: 62 BPM | OXYGEN SATURATION: 100 % | WEIGHT: 280 LBS

## 2025-05-13 DIAGNOSIS — D50.9 IRON DEFICIENCY ANEMIA, UNSPECIFIED IRON DEFICIENCY ANEMIA TYPE: ICD-10-CM

## 2025-05-13 PROCEDURE — 7100000010 HC PHASE TWO TIME - EACH INCREMENTAL 1 MINUTE

## 2025-05-13 PROCEDURE — 3700000001 HC GENERAL ANESTHESIA TIME - INITIAL BASE CHARGE

## 2025-05-13 PROCEDURE — 7100000009 HC PHASE TWO TIME - INITIAL BASE CHARGE

## 2025-05-13 PROCEDURE — 45378 DIAGNOSTIC COLONOSCOPY: CPT | Performed by: SURGERY

## 2025-05-13 PROCEDURE — 2500000004 HC RX 250 GENERAL PHARMACY W/ HCPCS (ALT 636 FOR OP/ED): Mod: JZ | Performed by: NURSE ANESTHETIST, CERTIFIED REGISTERED

## 2025-05-13 PROCEDURE — 3700000002 HC GENERAL ANESTHESIA TIME - EACH INCREMENTAL 1 MINUTE

## 2025-05-13 RX ORDER — PRAMIPEXOLE DIHYDROCHLORIDE 1 MG/1
1 TABLET ORAL NIGHTLY
COMMUNITY

## 2025-05-13 RX ORDER — PROPOFOL 10 MG/ML
INJECTION, EMULSION INTRAVENOUS AS NEEDED
Status: DISCONTINUED | OUTPATIENT
Start: 2025-05-13 | End: 2025-05-13

## 2025-05-13 RX ORDER — TIRZEPATIDE 2.5 MG/.5ML
2.5 INJECTION, SOLUTION SUBCUTANEOUS
COMMUNITY
Start: 2025-02-03

## 2025-05-13 RX ORDER — NALTREXONE HYDROCHLORIDE 50 MG/1
1 TABLET, FILM COATED ORAL
COMMUNITY
Start: 2025-05-04

## 2025-05-13 RX ORDER — LOSARTAN POTASSIUM AND HYDROCHLOROTHIAZIDE 12.5; 5 MG/1; MG/1
1 TABLET ORAL
COMMUNITY
Start: 2025-03-05

## 2025-05-13 RX ADMIN — PROPOFOL 30 MG: 10 INJECTION, EMULSION INTRAVENOUS at 08:16

## 2025-05-13 RX ADMIN — PROPOFOL 30 MG: 10 INJECTION, EMULSION INTRAVENOUS at 08:18

## 2025-05-13 RX ADMIN — PROPOFOL 30 MG: 10 INJECTION, EMULSION INTRAVENOUS at 08:14

## 2025-05-13 RX ADMIN — PROPOFOL 50 MG: 10 INJECTION, EMULSION INTRAVENOUS at 08:06

## 2025-05-13 RX ADMIN — PROPOFOL 50 MG: 10 INJECTION, EMULSION INTRAVENOUS at 08:03

## 2025-05-13 RX ADMIN — PROPOFOL 30 MG: 10 INJECTION, EMULSION INTRAVENOUS at 08:22

## 2025-05-13 RX ADMIN — PROPOFOL 50 MG: 10 INJECTION, EMULSION INTRAVENOUS at 08:05

## 2025-05-13 RX ADMIN — PROPOFOL 40 MG: 10 INJECTION, EMULSION INTRAVENOUS at 08:08

## 2025-05-13 RX ADMIN — PROPOFOL 30 MG: 10 INJECTION, EMULSION INTRAVENOUS at 08:12

## 2025-05-13 RX ADMIN — PROPOFOL 30 MG: 10 INJECTION, EMULSION INTRAVENOUS at 08:20

## 2025-05-13 RX ADMIN — PROPOFOL 50 MG: 10 INJECTION, EMULSION INTRAVENOUS at 08:04

## 2025-05-13 RX ADMIN — PROPOFOL 50 MG: 10 INJECTION, EMULSION INTRAVENOUS at 08:02

## 2025-05-13 RX ADMIN — PROPOFOL 30 MG: 10 INJECTION, EMULSION INTRAVENOUS at 08:10

## 2025-05-13 SDOH — HEALTH STABILITY: MENTAL HEALTH: CURRENT SMOKER: 0

## 2025-05-13 ASSESSMENT — PAIN SCALES - GENERAL
PAINLEVEL_OUTOF10: 0 - NO PAIN
PAIN_LEVEL: 0
PAINLEVEL_OUTOF10: 0 - NO PAIN
PAINLEVEL_OUTOF10: 0 - NO PAIN

## 2025-05-13 ASSESSMENT — COLUMBIA-SUICIDE SEVERITY RATING SCALE - C-SSRS
1. IN THE PAST MONTH, HAVE YOU WISHED YOU WERE DEAD OR WISHED YOU COULD GO TO SLEEP AND NOT WAKE UP?: NO
2. HAVE YOU ACTUALLY HAD ANY THOUGHTS OF KILLING YOURSELF?: NO
6. HAVE YOU EVER DONE ANYTHING, STARTED TO DO ANYTHING, OR PREPARED TO DO ANYTHING TO END YOUR LIFE?: NO

## 2025-05-13 ASSESSMENT — PAIN - FUNCTIONAL ASSESSMENT
PAIN_FUNCTIONAL_ASSESSMENT: 0-10

## 2025-05-13 NOTE — H&P
"History Of Present Illness  Sun Zimmerman is a 60 y.o. female presenting for a colonoscopy      Past Medical History  Medical History[1]    Surgical History  Surgical History[2]     Social History  She reports that she has never smoked. She has never used smokeless tobacco. She reports current alcohol use. She reports that she does not use drugs.    Family History  Family History[3]     Allergies  Ciprofloxacin    Review of Systems   All other systems reviewed and are negative.       Physical Exam  Constitutional:       Appearance: Normal appearance.   Cardiovascular:      Heart sounds: Normal heart sounds.   Pulmonary:      Breath sounds: Normal breath sounds and air entry.   Abdominal:      General: Abdomen is flat.      Palpations: Abdomen is soft.      Tenderness: There is no abdominal tenderness.   Neurological:      Mental Status: She is alert.          Last Recorded Vitals  Blood pressure (!) 161/93, pulse 66, temperature 36 °C (96.8 °F), temperature source Temporal, resp. rate 17, height 1.651 m (5' 5\"), weight 127 kg (280 lb), SpO2 98%.         Assessment & Plan  Iron deficiency anemia, unspecified iron deficiency anemia type      COLONOSCOPY/BIOPSIES.  Risks include, but not limited to pain, infection, bleeding, perforation, missed lesions, aspiration, risk of cardiac, pulmonary, neurologic, locomotor, anesthetic events, incomplete colonoscopy, and other unforeseen complications including death      Lamont Ely MD         [1]   Past Medical History:  Diagnosis Date    Cerebral meningioma (Multi)     Delayed emergence from general anesthesia     Encounter for other preprocedural examination 05/25/2017    Pre-op testing    Encounter for preprocedural respiratory examination 05/24/2017    Preop pulmonary/respiratory exam    GERD (gastroesophageal reflux disease)     Hypertension     Other conditions influencing health status 10/14/2016    Difficulty Breathing (Dyspnea)    Seizure disorder (Multi) " 06/05/2024    Resolved with Brain surgery (tumor removed)    Sleep apnea     uses CPAP    Vision loss    [2]   Past Surgical History:  Procedure Laterality Date    BRAIN TUMOR EXCISION  06/2024    CHOLECYSTECTOMY      HYSTERECTOMY  06/12/2013    Hysterectomy    KNEE ARTHROSCOPY W/ DEBRIDEMENT  05/09/2013    Arthroscopy Knee Right    MR HEAD ANGIO WO IV CONTRAST  12/10/2015    MR HEAD ANGIO WO IV CONTRAST 12/10/2015 [facility] CLINICAL LEGACY    OTHER SURGICAL HISTORY  01/05/2022    Transobturator sling placement    OTHER SURGICAL HISTORY  01/09/2020    Colonoscopy   [3] No family history on file.

## 2025-05-13 NOTE — DISCHARGE INSTRUCTIONS
Patient Instructions after a Colonoscopy      The anesthetics, sedatives or narcotics which were given to you today will be acting in your body for the next 24 hours, so you might feel a little sleepy or groggy.  This feeling should slowly wear off. Carefully read and follow the instructions.     You received sedation today:  - Do not drive or operate any machinery or power tools of any kind.   - No alcoholic beverages today, not even beer or wine.  - Do not make any important decisions or sign any legal documents.  - No over the counter medications that contain alcohol or that may cause drowsiness.  - Do not make any important decisions or sign any legal documents.  - Make sure you have someone with you for first 24 hours.    While it is common to experience mild to moderate abdominal distention, gas, or belching after your procedure, if any of these symptoms occur following discharge from the GI Lab or within one week of having your procedure, call the [facility] to be advised whether a visit to your nearest Urgent Care or Emergency Department is indicated.  Take this paper with you if you go.     - If you develop an allergic reaction to the medications that were given during your procedure such as difficulty breathing, rash, hives, severe nausea, vomiting or lightheadedness.  - If you experience chest pain, shortness of breath, severe abdominal pain, fevers and chills.  -If you develop signs and symptoms of bleeding such as blood in your spit, if your stools turn black, tarry, or bloody  - If you have not urinated within 8 hours following your procedure.  - If your IV site becomes painful, red, inflamed, or looks infected.    If you received a biopsy/polypectomy/sphincterotomy the following instructions apply below:    __ Do not use Aspirin containing products, non-steroidal medications or anti-coagulants for one week following your procedure. (Examples of these types of medications are: Advil,  Arthrotec, Aleve, Coumadin, Ecotrin, Heparin, Ibuprofen, Indocin, Motrin, Naprosyn, Nuprin, Plavix, Vioxx, and Voltarin, or their generic forms.  This list is not all-inclusive.  Check with your physician or pharmacist before resuming medications.)   __ Eat a soft diet today.  Avoid foods that are poorly digested for the next 24 hours.  These foods would include: nuts, beans, lettuce, red meats, and fried foods. Start with liquids and advance your diet as tolerated, gradually work up to eating solids.   __ Do not have a Barium Study or Enema for one week.    Your physician recommends the additional following instructions:    -You have a contact number available for emergencies. The signs and symptoms of potential delayed complications were discussed with you. You may return to normal activities tomorrow.  -Resume your previous diet.  -Continue your present medications.   -We are waiting for your pathology results.  -Your physician has recommended a repeat colonoscopy (date to be determined after pending pathology results are reviewed) for surveillance based on pathology results.  -The findings and recommendations have been discussed with you.  -The findings and recommendations were discussed with your family.  - Please see Medication Reconciliation Form for new medication/medications prescribed.       If you experience any problems or have any questions following discharge from the GI Lab, please call:        Nurse Signature                                                                        Date___________________                                                                            Patient/Responsible Party Signature                                        Date___________________

## 2025-05-13 NOTE — ANESTHESIA POSTPROCEDURE EVALUATION
Patient: Sun Zimmerman    Procedure Summary       Date: 05/13/25 Room / Location: [facility]    Anesthesia Start: 0800 Anesthesia Stop: 0831    Procedure: COLONOSCOPY Diagnosis: Iron deficiency anemia, unspecified iron deficiency anemia type    Scheduled Providers: Lamont Ely MD Responsible Provider: LORIE Bryant    Anesthesia Type: MAC ASA Status: 3            Anesthesia Type: MAC    Vitals Value Taken Time   /82 05/13/25 08:37   Temp 36.2 °C (97.2 °F) 05/13/25 08:27   Pulse 65 05/13/25 08:37   Resp 17 05/13/25 08:37   SpO2 99 % 05/13/25 08:37       Anesthesia Post Evaluation    Patient location during evaluation: PACU  Patient participation: complete - patient participated  Level of consciousness: awake and alert  Pain score: 0  Pain management: adequate  Airway patency: patent  Cardiovascular status: acceptable  Respiratory status: acceptable  Hydration status: acceptable  Postoperative Nausea and Vomiting: none        There were no known notable events for this encounter.     Detail Level: Detailed

## 2025-05-19 ENCOUNTER — TELEPHONE (OUTPATIENT)
Dept: HEMATOLOGY/ONCOLOGY | Facility: HOSPITAL | Age: 60
End: 2025-05-19
Payer: COMMERCIAL

## 2025-05-19 NOTE — PROGRESS NOTES
Subjective      Chief Complaint   Patient presents with    Left Knee - Pain        Surgical History[1]     Medical History[2]     HPI  This 60 year old patient presents today with left knee pain which she currently rates at 9/10. The patient states that this left knee pain has been worsening and persistent for years.  She has been previously evaluated by orthopedic surgery for left knee osteoarthritis and treated with cortisone injections with good relief of her left knee pain.  Over the past year she was diagnosed with a brain tumor and underwent brain surgery to remove the tumor.  She presents today to [facility] care.  The patient denies trauma or injury to the left knee.  However [place] when she felt a ripping sensation at the left knee.  She currently denies left knee instability.  The patient states that the left knee pain is worse with and aggravated by prolonged walking and standing. The patient states that this left knee pain impairs their ability to complete normal activities of daily living. The patient has tried Tylenol and ibuprofen with no relief.    RX Allergies[3]     Family History[4]     Social History     Socioeconomic History    Marital status:      Spouse name: Not on file    Number of children: Not on file    Years of education: Not on file    Highest education level: Not on file   Occupational History    Not on file   Tobacco Use    Smoking status: Never    Smokeless tobacco: Never   Vaping Use    Vaping status: Never Used   Substance and Sexual Activity    Alcohol use: Yes     Comment: 1 x week    Drug use: Never    Sexual activity: Defer   Other Topics Concern    Not on file   Social History Narrative    Not on file     Social Drivers of Health     Financial Resource Strain: Not on file   Food Insecurity: Not on file   Transportation Needs: No Transportation Needs (7/9/2024)    OASIS : Transportation     Lack of Transportation (Medical): No     Lack of  Transportation (Non-Medical): No     Patient Unable or Declines to Respond: No   Physical Activity: Not on file   Stress: Not on file   Social Connections: Feeling Socially Integrated (7/9/2024)    OASIS : Social Isolation     Frequency of experiencing loneliness or isolation: Never   Intimate Partner Violence: Not on file   Housing Stability: Not on file        ROS  CARDIOLOGY:   Negative for chest pain, shortness of breath.   RESPIRATORY:   Negative for chest pain, shortness of breath.   MUSCULOSKELETAL:   See HPI for details.   NEUROLOGY:   Negative for tingling, numbness, weakness.    Objective    Body mass index is 46.59 kg/m².     Physical Exam  GENERAL:          General Appearance:  This is a pleasant patient with appropriate affect, in no acute distress.   DERMATOLOGY:          Skin: skin at the neck, upper and lower back, and trunk is intact. There is no evidence of skin rash, skin breakdown or ulceration, or atrophic skin change.   EXTREMITIES:          Vascular:  Right, left hands and feet are warm with good color and pulses. Right and left calf and thigh are nontender and nonswollen.   NEUROLOGICAL:          Orientation:  Patient is alert and oriented to person, place, time and situation. Right and left upper and lower extremity motor and sensory examinations are intact.  MUSCULOSKELETAL: Neck: No tenderness. No pain or limitation with range of motion. Back: No tenderness. Straight leg test negative bilaterally. Right and left hips: No tenderness over the right or left greater trochanter. Right and left lower extremity in good position. Right knee: Nontender. No pain with gentle ROM. left knee: There is diffuse tenderness over the knee at the medial joint line. The patient has ROM from 2 to 115 degrees. McMurrays is equivocal. Anterior drawer and lachmans are equivocal. There is not an effusion present. The left knee is unstable to valgus and varus stressing. Nontender in the left calf. Compartments  are soft. Neurovascular is intact to light touch. The patient walks with a painful gait favoring the left  knee while walking.    Imaging  AP and lateral x-rays of the left knee done on 5/15/2024 show severe osteoarthritis of the left knee worse in the medial compartment.    Cardiology, Vascular, and Other Imaging  No other imaging results found for the past 7 days     Patient ID: Sun Zimmerman is a 60 y.o. female.    L Inj/Asp: L knee on 5/21/2025 8:36 AM  Indications: pain  Details: 22 G needle, medial approach  Medications: 1 mL lidocaine 10 mg/mL (1 %); 10 mg triamcinolone acetonide 40 mg/mL  Outcome: tolerated well, no immediate complications  Procedure, treatment alternatives, risks and benefits explained, specific risks discussed. Immediately prior to procedure a time out was called to verify the correct patient, procedure, equipment, support staff and site/side marked as required. Patient was prepped and draped in the usual sterile fashion.           Sun was seen today for pain.  Diagnoses and all orders for this visit:  Left knee pain, unspecified chronicity (Primary)  -     Referral to Physical Therapy; Future  Sprain of left knee, initial encounter  -     Referral to Physical Therapy; Future  Primary osteoarthritis of left knee  -     Referral to Physical Therapy; Future     Options are discussed with the patient in detail.  The patient is given a prescription for physical therapy.  The patient is instructed regarding activity modification, ice, provider directed at home gentle strengthening and ROM exercises, and the appropriate use of Tylenol as needed for pain with its potential adverse reactions and side effects. The patient understands. The patient states that despite all the treatment listed above that this left knee pain is debilitating and  requests a discussion of further options. Cortisone injection to the left knee is discussed in the office today. This is done in the office today.  See procedures below. Return as needed to discuss gel injections, Please note that this report has been produced using speech recognition software.  It may contain errors related to grammar, punctuation or spelling.  Electronically signed, but not reviewed.     Heidi Tapia PA-C           [1]   Past Surgical History:  Procedure Laterality Date    BRAIN TUMOR EXCISION  06/2024    CHOLECYSTECTOMY      HYSTERECTOMY  06/12/2013    Hysterectomy    KNEE ARTHROSCOPY W/ DEBRIDEMENT  05/09/2013    Arthroscopy Knee Right    MR HEAD ANGIO WO IV CONTRAST  12/10/2015    MR HEAD ANGIO WO IV CONTRAST 12/10/2015 [facility] CLINICAL LEGACY    OTHER SURGICAL HISTORY  01/05/2022    Transobturator sling placement    OTHER SURGICAL HISTORY  01/09/2020    Colonoscopy   [2]   Past Medical History:  Diagnosis Date    Cerebral meningioma (Multi)     Delayed emergence from general anesthesia     Depression     Encounter for other preprocedural examination 05/25/2017    Pre-op testing    Encounter for preprocedural respiratory examination 05/24/2017    Preop pulmonary/respiratory exam    Fibromyalgia, primary     GERD (gastroesophageal reflux disease)     Headache, tension-type     Hypertension     Migraine     Other conditions influencing health status 10/14/2016    Difficulty Breathing (Dyspnea)    Restless leg syndrome     Seizure disorder (Multi) 06/05/2024    Resolved with Brain surgery (tumor removed)    Sleep apnea     uses CPAP    Vision loss    [3]   Allergies  Allergen Reactions    Ciprofloxacin Hives and Rash   [4]   Family History  Problem Relation Name Age of Onset    Depression Mother Angella Arben

## 2025-05-20 ENCOUNTER — LAB (OUTPATIENT)
Dept: LAB | Facility: HOSPITAL | Age: 60
End: 2025-05-20
Payer: COMMERCIAL

## 2025-05-20 DIAGNOSIS — D50.9 IRON DEFICIENCY ANEMIA, UNSPECIFIED: Primary | ICD-10-CM

## 2025-05-20 DIAGNOSIS — D50.9 IRON DEFICIENCY ANEMIA, UNSPECIFIED IRON DEFICIENCY ANEMIA TYPE: ICD-10-CM

## 2025-05-20 LAB
BASOPHILS # BLD AUTO: 0.06 X10*3/UL (ref 0–0.1)
BASOPHILS NFR BLD AUTO: 0.7 %
EOSINOPHIL # BLD AUTO: 0.22 X10*3/UL (ref 0–0.7)
EOSINOPHIL NFR BLD AUTO: 2.6 %
ERYTHROCYTE [DISTWIDTH] IN BLOOD BY AUTOMATED COUNT: 14.4 % (ref 11.5–14.5)
FERRITIN SERPL-MCNC: 17 NG/ML (ref 8–150)
HCT VFR BLD AUTO: 44.5 % (ref 36–46)
HGB BLD-MCNC: 13.9 G/DL (ref 12–16)
IMM GRANULOCYTES # BLD AUTO: 0.03 X10*3/UL (ref 0–0.7)
IMM GRANULOCYTES NFR BLD AUTO: 0.4 % (ref 0–0.9)
IRON SATN MFR SERPL: ABNORMAL %
IRON SERPL-MCNC: 46 UG/DL (ref 35–150)
LYMPHOCYTES # BLD AUTO: 1.53 X10*3/UL (ref 1.2–4.8)
LYMPHOCYTES NFR BLD AUTO: 18.1 %
MCH RBC QN AUTO: 28.4 PG (ref 26–34)
MCHC RBC AUTO-ENTMCNC: 31.2 G/DL (ref 32–36)
MCV RBC AUTO: 91 FL (ref 80–100)
MONOCYTES # BLD AUTO: 0.82 X10*3/UL (ref 0.1–1)
MONOCYTES NFR BLD AUTO: 9.7 %
NEUTROPHILS # BLD AUTO: 5.78 X10*3/UL (ref 1.2–7.7)
NEUTROPHILS NFR BLD AUTO: 68.5 %
NRBC BLD-RTO: 0 /100 WBCS (ref 0–0)
PLATELET # BLD AUTO: 321 X10*3/UL (ref 150–450)
RBC # BLD AUTO: 4.9 X10*6/UL (ref 4–5.2)
TIBC SERPL-MCNC: ABNORMAL UG/DL
UIBC SERPL-MCNC: >450 UG/DL (ref 110–370)
WBC # BLD AUTO: 8.4 X10*3/UL (ref 4.4–11.3)

## 2025-05-20 PROCEDURE — 36415 COLL VENOUS BLD VENIPUNCTURE: CPT

## 2025-05-20 PROCEDURE — 85025 COMPLETE CBC W/AUTO DIFF WBC: CPT

## 2025-05-20 PROCEDURE — 83550 IRON BINDING TEST: CPT

## 2025-05-20 PROCEDURE — 83540 ASSAY OF IRON: CPT

## 2025-05-20 PROCEDURE — 82728 ASSAY OF FERRITIN: CPT

## 2025-05-21 ENCOUNTER — HOSPITAL ENCOUNTER (OUTPATIENT)
Dept: RADIOLOGY | Facility: CLINIC | Age: 60
Discharge: HOME | End: 2025-05-21
Payer: COMMERCIAL

## 2025-05-21 ENCOUNTER — OFFICE VISIT (OUTPATIENT)
Dept: HEMATOLOGY/ONCOLOGY | Facility: HOSPITAL | Age: 60
End: 2025-05-21
Payer: COMMERCIAL

## 2025-05-21 ENCOUNTER — OFFICE VISIT (OUTPATIENT)
Dept: ORTHOPEDIC SURGERY | Facility: CLINIC | Age: 60
End: 2025-05-21
Payer: COMMERCIAL

## 2025-05-21 VITALS
OXYGEN SATURATION: 96 % | TEMPERATURE: 96.3 F | WEIGHT: 288.47 LBS | BODY MASS INDEX: 48.06 KG/M2 | RESPIRATION RATE: 16 BRPM | HEIGHT: 65 IN | DIASTOLIC BLOOD PRESSURE: 96 MMHG | SYSTOLIC BLOOD PRESSURE: 152 MMHG | HEART RATE: 80 BPM

## 2025-05-21 VITALS — HEIGHT: 65 IN | WEIGHT: 280 LBS | BODY MASS INDEX: 46.65 KG/M2

## 2025-05-21 DIAGNOSIS — M17.12 ARTHRITIS OF LEFT KNEE: ICD-10-CM

## 2025-05-21 DIAGNOSIS — D50.9 IRON DEFICIENCY ANEMIA, UNSPECIFIED IRON DEFICIENCY ANEMIA TYPE: Primary | ICD-10-CM

## 2025-05-21 DIAGNOSIS — S83.92XA SPRAIN OF LEFT KNEE, INITIAL ENCOUNTER: ICD-10-CM

## 2025-05-21 DIAGNOSIS — M17.12 PRIMARY OSTEOARTHRITIS OF LEFT KNEE: ICD-10-CM

## 2025-05-21 DIAGNOSIS — M25.562 LEFT KNEE PAIN, UNSPECIFIED CHRONICITY: Primary | ICD-10-CM

## 2025-05-21 PROCEDURE — 99215 OFFICE O/P EST HI 40 MIN: CPT | Performed by: INTERNAL MEDICINE

## 2025-05-21 PROCEDURE — 1036F TOBACCO NON-USER: CPT | Performed by: INTERNAL MEDICINE

## 2025-05-21 PROCEDURE — 20610 DRAIN/INJ JOINT/BURSA W/O US: CPT | Mod: LT | Performed by: PHYSICIAN ASSISTANT

## 2025-05-21 PROCEDURE — 73564 X-RAY EXAM KNEE 4 OR MORE: CPT | Mod: LEFT SIDE | Performed by: RADIOLOGY

## 2025-05-21 PROCEDURE — 3077F SYST BP >= 140 MM HG: CPT | Performed by: INTERNAL MEDICINE

## 2025-05-21 PROCEDURE — 73564 X-RAY EXAM KNEE 4 OR MORE: CPT | Mod: LT

## 2025-05-21 PROCEDURE — 99213 OFFICE O/P EST LOW 20 MIN: CPT | Mod: 25 | Performed by: PHYSICIAN ASSISTANT

## 2025-05-21 PROCEDURE — 2500000004 HC RX 250 GENERAL PHARMACY W/ HCPCS (ALT 636 FOR OP/ED): Performed by: PHYSICIAN ASSISTANT

## 2025-05-21 PROCEDURE — 3080F DIAST BP >= 90 MM HG: CPT | Performed by: INTERNAL MEDICINE

## 2025-05-21 PROCEDURE — 3008F BODY MASS INDEX DOCD: CPT | Performed by: INTERNAL MEDICINE

## 2025-05-21 RX ORDER — TRIAMCINOLONE ACETONIDE 40 MG/ML
10 INJECTION, SUSPENSION INTRA-ARTICULAR; INTRAMUSCULAR
Status: COMPLETED | OUTPATIENT
Start: 2025-05-21 | End: 2025-05-21

## 2025-05-21 RX ORDER — LIDOCAINE HYDROCHLORIDE 10 MG/ML
1 INJECTION, SOLUTION INFILTRATION; PERINEURAL
Status: COMPLETED | OUTPATIENT
Start: 2025-05-21 | End: 2025-05-21

## 2025-05-21 RX ADMIN — TRIAMCINOLONE ACETONIDE 10 MG: 40 INJECTION, SUSPENSION INTRA-ARTICULAR; INTRAMUSCULAR at 08:36

## 2025-05-21 RX ADMIN — LIDOCAINE HYDROCHLORIDE 1 ML: 10 INJECTION, SOLUTION INFILTRATION; PERINEURAL at 08:36

## 2025-05-21 ASSESSMENT — ENCOUNTER SYMPTOMS
SEIZURES: 0
FEVER: 0
CARDIOVASCULAR NEGATIVE: 1
RESPIRATORY NEGATIVE: 1
GASTROINTESTINAL NEGATIVE: 1
HEADACHES: 0
SPEECH DIFFICULTY: 0
DIZZINESS: 1
DEPRESSION: 0
FATIGUE: 1
LOSS OF SENSATION IN FEET: 0
OCCASIONAL FEELINGS OF UNSTEADINESS: 0
UNEXPECTED WEIGHT CHANGE: 0

## 2025-05-21 ASSESSMENT — PAIN SCALES - GENERAL
PAINLEVEL_OUTOF10: 5 - MODERATE PAIN
PAINLEVEL_OUTOF10: 5
PAINLEVEL_OUTOF10: 0-NO PAIN

## 2025-05-21 ASSESSMENT — LIFESTYLE VARIABLES
HOW OFTEN DURING THE LAST YEAR HAVE YOU FAILED TO DO WHAT WAS NORMALLY EXPECTED FROM YOU BECAUSE OF DRINKING: NEVER
AUDIT-C TOTAL SCORE: 2
HOW OFTEN DO YOU HAVE SIX OR MORE DRINKS ON ONE OCCASION: NEVER
HOW OFTEN DURING THE LAST YEAR HAVE YOU BEEN UNABLE TO REMEMBER WHAT HAPPENED THE NIGHT BEFORE BECAUSE YOU HAD BEEN DRINKING: NEVER
HOW OFTEN DURING THE LAST YEAR HAVE YOU FOUND THAT YOU WERE NOT ABLE TO STOP DRINKING ONCE YOU HAD STARTED: NEVER
HOW OFTEN DO YOU HAVE A DRINK CONTAINING ALCOHOL: 2-4 TIMES A MONTH
AUDIT TOTAL SCORE: 2
HOW OFTEN DURING THE LAST YEAR HAVE YOU NEEDED AN ALCOHOLIC DRINK FIRST THING IN THE MORNING TO GET YOURSELF GOING AFTER A NIGHT OF HEAVY DRINKING: NEVER
SKIP TO QUESTIONS 9-10: 1
HAVE YOU OR SOMEONE ELSE BEEN INJURED AS A RESULT OF YOUR DRINKING: NO
HOW OFTEN DURING THE LAST YEAR HAVE YOU HAD A FEELING OF GUILT OR REMORSE AFTER DRINKING: NEVER
HAS A RELATIVE, FRIEND, DOCTOR, OR ANOTHER HEALTH PROFESSIONAL EXPRESSED CONCERN ABOUT YOUR DRINKING OR SUGGESTED YOU CUT DOWN: NO
HOW MANY STANDARD DRINKS CONTAINING ALCOHOL DO YOU HAVE ON A TYPICAL DAY: 1 OR 2

## 2025-05-21 ASSESSMENT — PAIN - FUNCTIONAL ASSESSMENT: PAIN_FUNCTIONAL_ASSESSMENT: 0-10

## 2025-05-21 ASSESSMENT — COLUMBIA-SUICIDE SEVERITY RATING SCALE - C-SSRS
1. IN THE PAST MONTH, HAVE YOU WISHED YOU WERE DEAD OR WISHED YOU COULD GO TO SLEEP AND NOT WAKE UP?: NO
6. HAVE YOU EVER DONE ANYTHING, STARTED TO DO ANYTHING, OR PREPARED TO DO ANYTHING TO END YOUR LIFE?: NO
2. HAVE YOU ACTUALLY HAD ANY THOUGHTS OF KILLING YOURSELF?: NO

## 2025-05-21 ASSESSMENT — PAIN DESCRIPTION - DESCRIPTORS: DESCRIPTORS: STABBING

## 2025-05-21 NOTE — PROGRESS NOTES
"Patient ID: Sun Zimmerman is a 60 y.o. female.    Subjective:  Was referred for anemia. My initial note in Jan 2025 was as follows:  Had meningioma removed in Jun e 2024 from L brain. Then started having hair loss and dizziness => Found to have low on iron in Nov 2024 => Started on PO iron. Feels better now.   Had gastric bypass in 2019 and hysterectomy in 2017. Denies seeing blood in stool or in urine.    Feels good. Denies fatigue. Takes oral iron supplements daily. Denies seeing blood in urine or stool.     Assessment/Plan:  ? Iron deficiency anemia: Iron sat was very low and HB was 10-11 in Nov 2024 => After PO iron x 1 month, she felt significantly better and HB improved. However, iron indices did not increaes and after 4 more months, her iron indices are still low. On the other hand, her HB is perfect and she denies feeling tired => I dont see an indication for IV iron. I asked her to continue taking PO iron.      Likely cause of iron deficiency is decreased absorption from GI tract due to bypass surgery. Her iron indices were low even in 2022. She had a repeaet colonoscopy in May 2025 => OK.     We will have her iron indices repeated in 6 mos and RTC in 1 yr.     Review Of Systems:  Review of Systems   Constitutional:  Positive for fatigue. Negative for fever and unexpected weight change.   HENT:  Negative.     Respiratory: Negative.     Cardiovascular: Negative.    Gastrointestinal: Negative.    Musculoskeletal:  Negative for gait problem.   Neurological:  Positive for dizziness. Negative for gait problem, headaches, seizures and speech difficulty.       Physical Exam:  BP (!) 152/96 (BP Location: Left arm, Patient Position: Sitting, BP Cuff Size: Adult long) Comment: monitors at home \"much better at home\"  Pulse 80   Temp 35.7 °C (96.3 °F) (Temporal)   Resp 16   Ht 1.651 m (5' 5\")   Wt 131 kg (288 lb 7.6 oz)   LMP  (LMP Unknown)   SpO2 96%   BMI 48.00 kg/m²   BSA: 2.45 meters " squared  Performance Status: Asymptomatic  Physical Exam  HENT:      Head: Normocephalic and atraumatic.   Eyes:      General: No scleral icterus.  Pulmonary:      Effort: Pulmonary effort is normal.   Musculoskeletal:         General: Normal range of motion.   Skin:     Coloration: Skin is not jaundiced.   Neurological:      General: No focal deficit present.      Mental Status: She is alert and oriented to person, place, and time. Mental status is at baseline.      Cranial Nerves: No cranial nerve deficit.      Sensory: No sensory deficit.         Results:  Diagnostic Results   Lab Results   Component Value Date    WBC 8.4 05/20/2025    HGB 13.9 05/20/2025    HCT 44.5 05/20/2025    MCV 91 05/20/2025     05/20/2025     Lab Results   Component Value Date    CALCIUM 9.4 01/16/2025     01/16/2025    K 4.2 01/16/2025    CO2 30 01/16/2025     01/16/2025    BUN 20 01/16/2025    CREATININE 0.70 01/16/2025    ALT 34 01/16/2025    AST 20 01/16/2025       Current Outpatient Medications:     DULoxetine (Cymbalta) 60 mg DR capsule, Take 1 capsule (60 mg) by mouth once daily., Disp: 90 capsule, Rfl: 1    gabapentin (Neurontin) 600 mg tablet, Take 1 tablet (600 mg) by mouth 2 times a day., Disp: 180 tablet, Rfl: 0    hydrOXYzine HCL (Atarax) 25 mg tablet, TAKE 1 TABLET BY MOUTH EVERYDAY AT BEDTIME, Disp: 30 tablet, Rfl: 0    losartan-hydrochlorothiazide (Hyzaar) 50-12.5 mg tablet, Take 1 tablet by mouth once daily in the morning. Take before meals., Disp: , Rfl:     naltrexone (Depade) 50 mg tablet, Take 1 tablet (50 mg) by mouth early in the morning.., Disp: , Rfl:     omeprazole (PriLOSEC) 40 mg DR capsule, TAKE 1 CAPSULE BY MOUTH EVERY DAY----MUST MAKE APPT, Disp: 90 capsule, Rfl: 3    pramipexole (Mirapex) 1 mg tablet, Take 1 tablet (1 mg) by mouth once daily at bedtime., Disp: , Rfl:     propranolol LA (Inderal LA) 80 mg 24 hr capsule, Take 1 capsule (80 mg) by mouth once daily., Disp: 90 capsule, Rfl:  0    Zepbound 2.5 mg/0.5 mL injection, Inject 2.5 mg under the skin every 7 days., Disp: , Rfl:   No current facility-administered medications for this visit.     Past Surgical History:   Procedure Laterality Date    BRAIN TUMOR EXCISION  06/2024    CHOLECYSTECTOMY      HYSTERECTOMY  06/12/2013    Hysterectomy    KNEE ARTHROSCOPY W/ DEBRIDEMENT  05/09/2013    Arthroscopy Knee Right    MR HEAD ANGIO WO IV CONTRAST  12/10/2015    MR HEAD ANGIO WO IV CONTRAST 12/10/2015 [facility] CLINICAL LEGACY    OTHER SURGICAL HISTORY  01/05/2022    Transobturator sling placement    OTHER SURGICAL HISTORY  01/09/2020    Colonoscopy     Family History   Problem Relation Name Age of Onset    Depression Mother Angella Theodore       reports that she has never smoked. She has never used smokeless tobacco.  Social History     Socioeconomic History    Marital status:      Spouse name: Not on file    Number of children: Not on file    Years of education: Not on file    Highest education level: Not on file   Occupational History    Not on file   Tobacco Use    Smoking status: Never    Smokeless tobacco: Never   Vaping Use    Vaping status: Never Used   Substance and Sexual Activity    Alcohol use: Yes     Comment: 1 x week    Drug use: Never    Sexual activity: Defer   Other Topics Concern    Not on file   Social History Narrative    Not on file     Social Drivers of Health     Financial Resource Strain: Not on file   Food Insecurity: Not on file   Transportation Needs: No Transportation Needs (7/9/2024)    OASIS : Transportation     Lack of Transportation (Medical): No     Lack of Transportation (Non-Medical): No     Patient Unable or Declines to Respond: No   Physical Activity: Not on file   Stress: Not on file   Social Connections: Feeling Socially Integrated (7/9/2024)    OASIS : Social Isolation     Frequency of experiencing loneliness or isolation: Never   Intimate Partner Violence: Not on file   Housing Stability: Not on file        Diagnoses and all orders for this visit:  Iron deficiency anemia, unspecified iron deficiency anemia type  -     Clinic Appointment Request  -     CBC and Auto Differential; Future  -     Ferritin; Future  -     Iron and TIBC; Future  -     Clinic Appointment Request; Future  -     CBC and Auto Differential; Future  -     Comprehensive Metabolic Panel; Future  -     Iron and TIBC; Future  -     Ferritin; Future  -     Vitamin B12; Future     Kajal Dominguez MD

## 2025-05-22 ENCOUNTER — APPOINTMENT (OUTPATIENT)
Dept: HEMATOLOGY/ONCOLOGY | Facility: HOSPITAL | Age: 60
End: 2025-05-22
Payer: COMMERCIAL

## 2025-06-05 ENCOUNTER — TELEPHONE (OUTPATIENT)
Dept: OBSTETRICS AND GYNECOLOGY | Facility: CLINIC | Age: 60
End: 2025-06-05
Payer: COMMERCIAL

## 2025-06-05 DIAGNOSIS — N30.10 INTERSTITIAL CYSTITIS (CHRONIC) WITHOUT HEMATURIA: ICD-10-CM

## 2025-06-05 RX ORDER — HYDROXYZINE HYDROCHLORIDE 25 MG/1
25 TABLET, FILM COATED ORAL NIGHTLY
Qty: 30 TABLET | Refills: 0 | Status: SHIPPED | OUTPATIENT
Start: 2025-06-05

## 2025-06-05 NOTE — TELEPHONE ENCOUNTER
Request received for   Requested Prescriptions     Pending Prescriptions Disp Refills    hydrOXYzine HCL (Atarax) 25 mg tablet 30 tablet 0     Sig: Take 1 tablet (25 mg) by mouth once daily at bedtime.       Sun Zimmerman was last seen 7/27/2023 and has an appt for 6/16/2025.

## 2025-06-06 ENCOUNTER — OFFICE VISIT (OUTPATIENT)
Dept: URGENT CARE | Age: 60
End: 2025-06-06
Payer: COMMERCIAL

## 2025-06-06 ENCOUNTER — APPOINTMENT (OUTPATIENT)
Dept: URGENT CARE | Age: 60
End: 2025-06-06
Payer: COMMERCIAL

## 2025-06-06 VITALS
HEART RATE: 73 BPM | TEMPERATURE: 97.9 F | OXYGEN SATURATION: 98 % | SYSTOLIC BLOOD PRESSURE: 128 MMHG | RESPIRATION RATE: 20 BRPM | WEIGHT: 278 LBS | BODY MASS INDEX: 46.26 KG/M2 | DIASTOLIC BLOOD PRESSURE: 87 MMHG

## 2025-06-06 DIAGNOSIS — N30.01 ACUTE CYSTITIS WITH HEMATURIA: Primary | ICD-10-CM

## 2025-06-06 LAB
POC APPEARANCE, URINE: ABNORMAL
POC BILIRUBIN, URINE: ABNORMAL
POC BLOOD, URINE: ABNORMAL
POC COLOR, URINE: YELLOW
POC GLUCOSE, URINE: NEGATIVE MG/DL
POC KETONES, URINE: ABNORMAL MG/DL
POC LEUKOCYTES, URINE: ABNORMAL
POC NITRITE,URINE: NEGATIVE
POC PH, URINE: 6 PH
POC PROTEIN, URINE: ABNORMAL MG/DL
POC SPECIFIC GRAVITY, URINE: >=1.03
POC UROBILINOGEN, URINE: 1 EU/DL

## 2025-06-06 RX ORDER — NITROFURANTOIN 25; 75 MG/1; MG/1
100 CAPSULE ORAL 2 TIMES DAILY
Qty: 10 CAPSULE | Refills: 0 | Status: SHIPPED | OUTPATIENT
Start: 2025-06-06 | End: 2025-06-11

## 2025-06-06 NOTE — PATIENT INSTRUCTIONS
Please follow up with your primary provider within one week if symptoms do not improve.  You may schedule an appointment online at \[email or web address]\"[email or web address] or call [phone]. Go to the Emergency Department if symptoms significantly worsen or if you develop chest pain or shortness of breath.

## 2025-06-06 NOTE — PROGRESS NOTES
Subjective   Patient ID: Sun Zimmerman is a 60 y.o. female. They present today with a chief complaint of UTI (Burning, blood in urine for 1 day).    History of Present Illness  Reports increased urinary frequency x2 days, blood in urine this morning. Last UTI 2 years ago and this feels similar. Denies abdominal or back pain, N/V, fever.      UTI      Past Medical History  Allergies as of 06/06/2025 - Reviewed 06/06/2025   Allergen Reaction Noted    Ciprofloxacin Hives and Rash 12/31/2021       Prescriptions Prior to Admission[1]     Medical History[2]    Surgical History[3]     reports that she has never smoked. She has never used smokeless tobacco. She reports current alcohol use. She reports that she does not use drugs.    Review of Systems  Pertinent systems reviewed and were negative unless otherwise stated in HPI.    Objective    Vitals:    06/06/25 1036   BP: 128/87   BP Location: Left arm   Patient Position: Sitting   BP Cuff Size: Large adult   Pulse: 73   Resp: 20   Temp: 36.6 °C (97.9 °F)   TempSrc: Oral   SpO2: 98%   Weight: 126 kg (278 lb)     No LMP recorded (lmp unknown). Patient is postmenopausal.    Physical Exam  Constitutional:       General: She is not in acute distress.  Cardiovascular:      Rate and Rhythm: Normal rate and regular rhythm.      Heart sounds: Normal heart sounds.   Pulmonary:      Effort: Pulmonary effort is normal.   Abdominal:      Palpations: Abdomen is soft.      Tenderness: There is no abdominal tenderness. There is no right CVA tenderness, left CVA tenderness or guarding.   Skin:     Findings: No rash.   Psychiatric:         Mood and Affect: Mood normal.         Behavior: Behavior normal.         Diagnostic study results (if any) were reviewed by Guero Deras PA-C.    Assessment/Plan   Allergies, medications, history, and pertinent labs/EKGs/imaging reviewed by Guero Deras PA-C.     Medical Decision Making  Low concern for pyelonephritis or  nephrolithiasis. Last Ucx from 2021 showed no growth    Orders and Diagnoses  Diagnoses and all orders for this visit:  Acute cystitis with hematuria  -     POCT UA Automated manually resulted  -     Urine Culture  -     nitrofurantoin, macrocrystal-monohydrate, (Macrobid) 100 mg capsule; Take 1 capsule (100 mg) by mouth 2 times a day for 5 days.      Medical Admin Record      Disposition: Home    Electronically signed by Guero Deras PA-C             [1] (Not in a hospital admission)   [2]   Past Medical History:  Diagnosis Date    Cerebral meningioma (Multi)     Delayed emergence from general anesthesia     Depression     Encounter for other preprocedural examination 05/25/2017    Pre-op testing    Encounter for preprocedural respiratory examination 05/24/2017    Preop pulmonary/respiratory exam    Fibromyalgia, primary     GERD (gastroesophageal reflux disease)     Headache, tension-type     Hypertension     Migraine     Other conditions influencing health status 10/14/2016    Difficulty Breathing (Dyspnea)    Restless leg syndrome     Seizure disorder (Multi) 06/05/2024    Resolved with Brain surgery (tumor removed)    Sleep apnea     uses CPAP    Vision loss    [3]   Past Surgical History:  Procedure Laterality Date    BRAIN TUMOR EXCISION  06/2024    CHOLECYSTECTOMY      HYSTERECTOMY  06/12/2013    Hysterectomy    KNEE ARTHROSCOPY W/ DEBRIDEMENT  05/09/2013    Arthroscopy Knee Right    MR HEAD ANGIO WO IV CONTRAST  12/10/2015    MR HEAD ANGIO WO IV CONTRAST 12/10/2015 [facility] [place] CLINICAL LEGACY    OTHER SURGICAL HISTORY  01/05/2022    Transobturator sling placement    OTHER SURGICAL HISTORY  01/09/2020    Colonoscopy

## 2025-06-08 LAB — BACTERIA UR CULT: NORMAL

## 2025-06-11 ENCOUNTER — TELEPHONE (OUTPATIENT)
Dept: URGENT CARE | Age: 60
End: 2025-06-11

## 2025-06-16 ENCOUNTER — OFFICE VISIT (OUTPATIENT)
Dept: OBSTETRICS AND GYNECOLOGY | Facility: CLINIC | Age: 60
End: 2025-06-16
Payer: COMMERCIAL

## 2025-06-16 VITALS
HEART RATE: 67 BPM | SYSTOLIC BLOOD PRESSURE: 139 MMHG | WEIGHT: 282.4 LBS | DIASTOLIC BLOOD PRESSURE: 85 MMHG | BODY MASS INDEX: 47.05 KG/M2 | HEIGHT: 65 IN

## 2025-06-16 DIAGNOSIS — N30.10 INTERSTITIAL CYSTITIS (CHRONIC) WITHOUT HEMATURIA: ICD-10-CM

## 2025-06-16 DIAGNOSIS — E04.1 THYROID NODULE: ICD-10-CM

## 2025-06-16 DIAGNOSIS — N39.9 URINARY DISORDER: Primary | ICD-10-CM

## 2025-06-16 LAB
POC APPEARANCE, URINE: CLEAR
POC BILIRUBIN, URINE: ABNORMAL
POC BLOOD, URINE: NEGATIVE
POC COLOR, URINE: ABNORMAL
POC GLUCOSE, URINE: NEGATIVE MG/DL
POC KETONES, URINE: NEGATIVE MG/DL
POC LEUKOCYTES, URINE: NEGATIVE
POC NITRITE,URINE: NEGATIVE
POC PH, URINE: 6 PH
POC PROTEIN, URINE: ABNORMAL MG/DL
POC SPECIFIC GRAVITY, URINE: 1.02
POC UROBILINOGEN, URINE: 2 EU/DL

## 2025-06-16 PROCEDURE — 81003 URINALYSIS AUTO W/O SCOPE: CPT | Mod: QW | Performed by: OBSTETRICS & GYNECOLOGY

## 2025-06-16 PROCEDURE — 51798 US URINE CAPACITY MEASURE: CPT | Performed by: OBSTETRICS & GYNECOLOGY

## 2025-06-16 PROCEDURE — 99212 OFFICE O/P EST SF 10 MIN: CPT | Mod: 25 | Performed by: OBSTETRICS & GYNECOLOGY

## 2025-06-16 PROCEDURE — 3079F DIAST BP 80-89 MM HG: CPT | Performed by: OBSTETRICS & GYNECOLOGY

## 2025-06-16 PROCEDURE — 3008F BODY MASS INDEX DOCD: CPT | Performed by: OBSTETRICS & GYNECOLOGY

## 2025-06-16 PROCEDURE — 1036F TOBACCO NON-USER: CPT | Performed by: OBSTETRICS & GYNECOLOGY

## 2025-06-16 PROCEDURE — 99212 OFFICE O/P EST SF 10 MIN: CPT | Performed by: OBSTETRICS & GYNECOLOGY

## 2025-06-16 PROCEDURE — 3075F SYST BP GE 130 - 139MM HG: CPT | Performed by: OBSTETRICS & GYNECOLOGY

## 2025-06-16 RX ORDER — HYDROXYZINE HYDROCHLORIDE 25 MG/1
25 TABLET, FILM COATED ORAL NIGHTLY
Qty: 90 TABLET | Refills: 3 | Status: SHIPPED | OUTPATIENT
Start: 2025-06-16

## 2025-06-16 ASSESSMENT — ENCOUNTER SYMPTOMS
PSYCHIATRIC NEGATIVE: 1
FREQUENCY: 1
MUSCULOSKELETAL NEGATIVE: 1
CARDIOVASCULAR NEGATIVE: 1
LOSS OF SENSATION IN FEET: 0
RESPIRATORY NEGATIVE: 1
DEPRESSION: 0
OCCASIONAL FEELINGS OF UNSTEADINESS: 0
ENDOCRINE NEGATIVE: 1
NEUROLOGICAL NEGATIVE: 1
EYES NEGATIVE: 1
GASTROINTESTINAL NEGATIVE: 1
CONSTITUTIONAL NEGATIVE: 1

## 2025-06-16 ASSESSMENT — PAIN SCALES - GENERAL: PAINLEVEL_OUTOF10: 0-NO PAIN

## 2025-06-16 NOTE — PROGRESS NOTES
Urogynecology  Provider:  Oralia Mcmahan MD  [phone]    ASSESSMENT AND PLAN:   60 year old female with IC, LUIS with urge and stress, with insensory urine loss s/p TVH with Dr. Pacheco in 2017, and hx of OSCAR s/p TOT sling placement in 2014 with Dr. José Luis Pacheco. Comorbidities include: HTN, GERD, fibromyalgia, ZEENAT, and PAD.    Diagnoses:  #1 Interstitial cystitis   #2 Thyroid nodule    Plan:  1. IC  - POCT UA negative aside from trace bilirubin and protein.   - We discussed consideration of increasing her Atarax dosage from 25mg to 50mg at night for 1-2 weeks during IC flares to help further reduce her symptoms and then drop back down to taking 25mg for a maintenance dosage after that time. She is amenable to this plan.   - Continue taking Hydroxyzine 25mg as this is adequately managing her IC symptoms and sent Rx refills to her pharmacy; she may increase to 2 tablets at bedtime for worsening symptoms.     2. Thyroid nodule  - Patient has thyroid nodules found on thyroid US on 2/4/2025 and PCP told her to observe this. However, due to moderate risk she prefers to be further assessed with possible FNA biopsy.   - Placed referral to ENT.     Follow up in 2 months for a virtual visit with Dr. Mcmahan to reassess IC management after restarting Atarax.     Scribe Attestation  By signing my name below, IMeet Scribe, attest that this documentation has been prepared under the direction and in the presence of Oralia Mcmahan MD on 06/16/2025 at 9:20 PM.     Agree with above. I Dr. Mcmahan, personally performed the services described in the documentation which was scribed virtually and confirm it is both complete and accurate.  Oralia Mcmahan MD      Problem List Items Addressed This Visit          Endocrine/Metabolic    Thyroid nodule    Relevant Orders    Referral to ENT     Other Visit Diagnoses         Urinary disorder    -  Primary    Relevant Orders    Measure post void residual (Completed)     POCT UA Automated manually resulted (Completed)      Interstitial cystitis (chronic) without hematuria        Relevant Medications    hydrOXYzine HCL (Atarax) 25 mg tablet                I spent a total of eConsult Time: 18 minutes in face to face and non face to face time.        Oralia Mcmahan MD        HISTORY OF PRESENT ILLNESS:   60 year old female presenting in follow up for IC.     Records Review:   - Saw Dr. Wilcox 7/2023  58 year old female with IC, LUIS with urge and stress, with insensory urine loss s/p TVH with Dr. Pacheco in 2017, and hx of OSCAR s/p TOT sling placement in 2014 with Dr. José Luis Pacheco. Comorbidities include: HTN, GERD, fibromyalgia, ZEENAT, and PAD.     Diagnoses:   #1 Mixed urinary incontinence, urge and stress  #2 Insensory urine loss   #3 Interstitial cystitis      Plan:   1. Mixed urinary incontinence with urge and stress, insensory urine loss  - PVR = 5ccs by bladder U/S.  - Endorses mixed incontinence with stress and urge as well as insensory urine loss since her TVT with Dr. Pacheco in 2017. Hx of TOT sling in 2014 for OSCAR with Dr. Pacheco.   - Plan to have her do a Pyridium pad test to evaluate if she is leaking urine (insensory urine loss) vs. discharge vs. sweat.   - if positive will get UDS and consider cysto     2. IC  - Continue taking Atarax 25mg 1 pill po qhs for management of IC.      Follow-up in 4 weeks with Dr. Tania Wilcox to discuss Pyridium pad test results for treatment planning.         No follow up. Unclear if pad test was done.     Urinary Symptoms:   - Endorses recent bladder flare with moderate blood on urine dip but no bacteria present.   - Patient experienced a suspected UTI flare-up around x2 weeks ago, with no bacteria found in the urine but a significant amount of blood present.  - She reports bladder pain and persistent IC symptoms despite taking antibiotics.  - She requests refill of her Hydroxyzine 25mg every night as this is adequately managing her IC  symptoms but stopped in November when the prescription ran out.. The patient denies any lingering drowsiness, daytime somnolence, or difficulty waking up in the morning after taking Atarax.   - The patient continues to wear a mini pad for occasional UI and declines interest in managing this at this time as she is busy with trying to manage other aspects of her health at this time.     Neurological History:  - The patient had a benign cerebral meningioma that measured 3.5 mm in size located on the left side which was surgically removed last year.  - Patient has thyroid nodules found on thyroid US on 2/4/2025 that she prefers to be further assessed.   - Family history of her mother having x2 glioblastomas.       Past Medical History:    Medical History[1]       Past Surgical History:     Surgical History[2]      Medications:     Prior to Admission medications    Medication Sig Start Date End Date Taking? Authorizing Provider   DULoxetine (Cymbalta) 60 mg DR capsule Take 1 capsule (60 mg) by mouth once daily. 5/16/24   Sonam Pena MD   gabapentin (Neurontin) 600 mg tablet Take 1 tablet (600 mg) by mouth 2 times a day. 5/8/25   Stefany Jensen MD   hydrOXYzine HCL (Atarax) 25 mg tablet Take 1 tablet (25 mg) by mouth once daily at bedtime. 6/5/25   Oralia Mcmahan MD   losartan-hydrochlorothiazide (Hyzaar) 50-12.5 mg tablet Take 1 tablet by mouth once daily in the morning. Take before meals. 3/5/25   Historical Provider, MD   naltrexone (Depade) 50 mg tablet Take 1 tablet (50 mg) by mouth early in the morning.. 5/4/25   Historical Provider, MD   nitrofurantoin, macrocrystal-monohydrate, (Macrobid) 100 mg capsule Take 1 capsule (100 mg) by mouth 2 times a day for 5 days. 6/6/25 6/11/25  Guero Deras PA-C   omeprazole (PriLOSEC) 40 mg DR capsule TAKE 1 CAPSULE BY MOUTH EVERY DAY----MUST MAKE APPT 4/12/24   Sonam Pena MD   pramipexole (Mirapex) 1 mg tablet Take 1 tablet (1 mg) by  "mouth once daily at bedtime.    Historical Provider, MD   propranolol LA (Inderal LA) 80 mg 24 hr capsule Take 1 capsule (80 mg) by mouth once daily. 5/14/24   Sonam Pena MD   Zepbound 2.5 mg/0.5 mL injection Inject 2.5 mg under the skin every 7 days. 2/3/25   Historical Provider, MD HUYNH  Review of Systems   Constitutional: Negative.    HENT: Negative.     Eyes: Negative.    Respiratory: Negative.     Cardiovascular: Negative.    Gastrointestinal: Negative.    Endocrine: Negative.    Genitourinary:  Positive for frequency and urgency.   Musculoskeletal: Negative.    Neurological: Negative.    Psychiatric/Behavioral: Negative.          POC Blood, Urine   Date Value Ref Range Status   06/06/2025 LARGE (3+) (A) NEGATIVE Final     Poc Nitrite, Urine   Date Value Ref Range Status   06/06/2025 NEGATIVE NEGATIVE Final     POC Urobilinogen, Urine   Date Value Ref Range Status   06/06/2025 1.0 0.2, 1.0 EU/DL Final     POC Leukocytes, Urine   Date Value Ref Range Status   06/06/2025 SMALL (1+) (A) NEGATIVE Final         PHYSICAL EXAM:    /85 (Patient Position: Sitting)   Pulse 67   Ht 1.651 m (5' 5\")   Wt 128 kg (282 lb 6.4 oz)   LMP  (LMP Unknown)   BMI 46.99 kg/m²   No LMP recorded (lmp unknown). Patient is postmenopausal.      Declines chaperone for physical exam.      Well developed, well nourished, in no apparent distress.   Neurologic/Psychiatric:  Awake, Alert and Oriented times 3.  Affect normal.     No pelvic exam    Data and DIAGNOSTIC STUDIES REVIEWED   Imaging  No results found.    Cardiology, Vascular, and Other Imaging  No other imaging results found for the past 7 days     Lab Results   Component Value Date    URINECULTURE SEE NOTE 06/06/2025      Lab Results   Component Value Date    GLUCOSE 88 01/16/2025    CALCIUM 9.4 01/16/2025     01/16/2025    K 4.2 01/16/2025    CO2 30 01/16/2025     01/16/2025    BUN 20 01/16/2025    CREATININE 0.70 01/16/2025     Lab Results "   Component Value Date    WBC 8.4 05/20/2025    HGB 13.9 05/20/2025    HCT 44.5 05/20/2025    MCV 91 05/20/2025     05/20/2025          Oralia Mcmahan MD             [1]   Past Medical History:  Diagnosis Date    Cerebral meningioma (Multi)     Delayed emergence from general anesthesia     Depression     Encounter for other preprocedural examination 05/25/2017    Pre-op testing    Encounter for preprocedural respiratory examination 05/24/2017    Preop pulmonary/respiratory exam    Fibromyalgia, primary     GERD (gastroesophageal reflux disease)     Headache, tension-type     Hypertension     Migraine     Other conditions influencing health status 10/14/2016    Difficulty Breathing (Dyspnea)    Restless leg syndrome     Seizure disorder (Multi) 06/05/2024    Resolved with Brain surgery (tumor removed)    Sleep apnea     uses CPAP    Vision loss    [2]   Past Surgical History:  Procedure Laterality Date    BRAIN TUMOR EXCISION  06/2024    CHOLECYSTECTOMY      HYSTERECTOMY  06/12/2013    Hysterectomy    KNEE ARTHROSCOPY W/ DEBRIDEMENT  05/09/2013    Arthroscopy Knee Right    MR HEAD ANGIO WO IV CONTRAST  12/10/2015    MR HEAD ANGIO WO IV CONTRAST 12/10/2015 [facility] CLINICAL LEGACY    OTHER SURGICAL HISTORY  01/05/2022    Transobturator sling placement    OTHER SURGICAL HISTORY  01/09/2020    Colonoscopy

## 2025-06-30 DIAGNOSIS — N30.10 INTERSTITIAL CYSTITIS (CHRONIC) WITHOUT HEMATURIA: ICD-10-CM

## 2025-06-30 RX ORDER — HYDROXYZINE HYDROCHLORIDE 25 MG/1
25 TABLET, FILM COATED ORAL NIGHTLY
Qty: 30 TABLET | OUTPATIENT
Start: 2025-06-30

## 2025-07-02 ENCOUNTER — APPOINTMENT (OUTPATIENT)
Dept: OTOLARYNGOLOGY | Facility: CLINIC | Age: 60
End: 2025-07-02
Payer: COMMERCIAL

## 2025-07-07 ENCOUNTER — EVALUATION (OUTPATIENT)
Dept: PHYSICAL THERAPY | Facility: CLINIC | Age: 60
End: 2025-07-07
Payer: COMMERCIAL

## 2025-07-07 DIAGNOSIS — M17.12 PRIMARY OSTEOARTHRITIS OF LEFT KNEE: Primary | ICD-10-CM

## 2025-07-07 DIAGNOSIS — M25.562 LEFT KNEE PAIN, UNSPECIFIED CHRONICITY: ICD-10-CM

## 2025-07-07 DIAGNOSIS — S83.92XA SPRAIN OF LEFT KNEE, INITIAL ENCOUNTER: ICD-10-CM

## 2025-07-07 PROCEDURE — 97161 PT EVAL LOW COMPLEX 20 MIN: CPT | Mod: GP | Performed by: PHYSICAL THERAPIST

## 2025-07-07 PROCEDURE — 97110 THERAPEUTIC EXERCISES: CPT | Mod: GP | Performed by: PHYSICAL THERAPIST

## 2025-07-07 SDOH — ECONOMIC STABILITY: GENERAL: QUALITY OF LIFE: EXCELLENT

## 2025-07-07 ASSESSMENT — ENCOUNTER SYMPTOMS
ALLEVIATING FACTORS: REST
ALLEVIATING FACTORS: MEDICATIONS
QUALITY: SHARP
DEPRESSION: 0
QUALITY: SQUEEZING
PAIN SCALE AT LOWEST: 3
LOSS OF SENSATION IN FEET: 0
PAIN SCALE AT HIGHEST: 8
QUALITY: THROBBING
OCCASIONAL FEELINGS OF UNSTEADINESS: 1
PAIN SCALE: 4
QUALITY: PRESSURE
QUALITY: TIGHT
ALLEVIATING FACTORS: ICE
QUALITY: DULL ACHE

## 2025-07-07 ASSESSMENT — ACTIVITIES OF DAILY LIVING (ADL): POOR_BALANCE: 1

## 2025-07-07 NOTE — PROGRESS NOTES
Physical Therapy Evaluation and Treatment     Patient Name: Sun Zimmerman  MRN: [id number]  Encounter date: 7/7/2025  Time Calculation  Start Time: 1330  Stop Time: 1415  Time Calculation (min): 45 min  PT Evaluation Time Entry  PT Evaluation (Low) Time Entry: 15  PT Therapeutic Procedures Time Entry  Therapeutic Exercise Time Entry: 24    Visit # 1 of 10  Visits/Dates Authorized: 7/3  07/01/2025: NO AUTH, 1000 DED-MET, 100% COVERAGE, 20V PT, 4500 OOP-NOT MET, ANTHEM     Current Problem:   Problem List Items Addressed This Visit           ICD-10-CM    Left knee pain M25.562    Relevant Orders    Follow Up In Physical Therapy    Primary osteoarthritis of left knee - Primary M17.12    Relevant Orders    Follow Up In Physical Therapy    Sprain of left knee, initial encounter S83.92XA    Relevant Orders    Follow Up In Physical Therapy     Precautions:          Steadi Fall Risk  One or more falls in the last year? No  How many Times?    Was the patient injured in the fall?    Has trouble stepping onto curb? No  Advised to use a cane or walker to get around safely? No  Often has to rush to toilet? No  Feels unsteady when walking? Yes  Has lost some feeling in feet? No  Often feels sad or depressed? No  Steadies self on furniture while walking at home? No  Takes medicine that makes them feel lightheaded or more tired than usual? Yes  Worried about Falling? No  Takes medicine to sleep or improve mood? No  Needs to push with hands when rising from a chair? Yes      Subjective Evaluation    History of Present Illness  Date of onset: 7/7/2020  Mechanism of injury: Pt reports that she has had long standing L knee pain, going back at least 5 years. Earlier this spring, she went to the [facility]. She had pain afterwards, getting out of the car and felt something rip in her knee. It has been really bothering her since then. She reports that she has had a Cortizone shot in the past few weeks. That was even more painful at  first but that has improve.   She reports that she has bone on bone and is planning to have a TKR this year, but wants to strengthen the knee before she has it.   Pt also reports she has had brain surgery to remove brain tumor with in the past year, so her balance is off .    Quality of life: excellent    Pain  Current pain ratin  At best pain rating: 3  At worst pain ratin  Location: Medial jt line of R  Quality: dull ache, sharp, tight, squeezing, throbbing and pressure  Relieving factors: rest, ice and medications  Exacerbated by: stairs and sit to stand, walking.    Diagnostic Tests  X-ray: abnormal    Patient Goals  Patient goals for therapy: increased strength, decreased pain, decreased edema, improved balance, return to sport/leisure activities and increased motion  Patient goal: to be able to go up and down steps and carry 9 month old grandson. Lose the fear of knee giving out so that she is able to walk            Objective     Tenderness     Right Knee   Tenderness in the medial joint line, medial patella and pes anserinus. No tenderness in the lateral joint line, lateral patella and lateral retinaculum.     Additional Tenderness Details  Medial femoral condyle       Vitals:       Objective     Tenderness     Right Knee   Tenderness in the medial joint line, medial patella and pes anserinus. No tenderness in the lateral joint line, lateral patella and lateral retinaculum.     Additional Tenderness Details  Medial femoral condyle       Objective     Tenderness     Right Knee   Tenderness in the medial joint line, medial patella and pes anserinus. No tenderness in the lateral joint line, lateral patella and lateral retinaculum.     Additional Tenderness Details  Medial femoral condyle            Balance:  Single leg: Firm Eyes Open R LE < 5 sec , L LE 10 sec sec    Other Outcome Measures:  Other Measures  Other Outcome Measures: WOMAC 42/96 = 44%    Treatments:     Therapeutic Exercise 38639:     HEP  / Access Codes URL: [email or web address]  Access Code: [email or web address]  URL: [email or web address]  Date: 07/07/2025  Prepared by: Teodoro Issa    Exercises  - Hooklying Isometric Hip Flexion   - Supine Quad Set  - 3 x daily - 7 x weekly - 1 sets - 20 reps  - Active Straight Leg Raise with Quad Set  - 3 x daily - 7 x weekly - 1 sets - 20 reps  - Supine Single Leg Hip and Knee Flexion ROM with Swiss Ball  - 3 x daily - 7 x weekly - 1 sets - 5 min hold    Assessment & Plan     Assessment  Impairments: abnormal gait, abnormal muscle firing, abnormal or restricted ROM, activity intolerance, impaired balance, impaired physical strength, lacks appropriate home exercise program and pain with function  Assessment details: Patient presents with L knee advanced OA. Key impairments include: decreased ROM and strength, poor balance and compensatory gait patterning, increased swelling, and pain with functional activities such as squatting, walking, and stairs. Patient would benefit from skilled physical therapy services to address these impairments and restore normal ROM and strength to reduce pain and improving activity participation.    Prognosis: good    Goals  to be able to go up and down steps and carry 9 month old grandson. Lose the fear of knee giving out so that she is able to walk    Plan  Therapy options: will be seen for skilled physical therapy services  Planned modality interventions: cryotherapy  Other planned modality interventions: DNP and IASTM  Planned therapy interventions: manual therapy, abdominal trunk stabilization, balance/weight-bearing training, neuromuscular re-education, body mechanics training, postural training, soft tissue mobilization, strengthening, stretching, therapeutic activities, joint mobilization, home exercise program, gait training and flexibility  Other planned therapy interventions: Prehab TKR for L knee  Frequency: 2x week  Duration in weeks: 10  Treatment plan discussed with:  patient          Low complexity due to patient's clinical presentation being stable and uncomplicated by any significant comorbidities that may affect rehab tolerance and progression.     Post-Treatment Symptoms:       Goals:   Active       PT Problem       PT Goal 1       Start:  07/07/25    Expected End:  10/05/25       1. Pt will be able to carry grand child up and down the stairs without increased shweta or difficulty  2. Pt will have 5/5 MMT B LES  3. Pt will have 0/0/130 degrees L knee PROM  4. Pt will improve WOMAC score to < 20% impairment.  5. Pt will be independent with HEP.

## 2025-07-09 ENCOUNTER — APPOINTMENT (OUTPATIENT)
Dept: PHYSICAL THERAPY | Facility: CLINIC | Age: 60
End: 2025-07-09
Payer: COMMERCIAL

## 2025-07-18 ENCOUNTER — TREATMENT (OUTPATIENT)
Dept: PHYSICAL THERAPY | Facility: CLINIC | Age: 60
End: 2025-07-18
Payer: COMMERCIAL

## 2025-07-18 DIAGNOSIS — M25.562 LEFT KNEE PAIN, UNSPECIFIED CHRONICITY: ICD-10-CM

## 2025-07-18 DIAGNOSIS — S83.92XA SPRAIN OF LEFT KNEE, INITIAL ENCOUNTER: ICD-10-CM

## 2025-07-18 DIAGNOSIS — M17.12 PRIMARY OSTEOARTHRITIS OF LEFT KNEE: ICD-10-CM

## 2025-07-18 PROCEDURE — 97110 THERAPEUTIC EXERCISES: CPT | Mod: GP | Performed by: PHYSICAL THERAPIST

## 2025-07-18 NOTE — PROGRESS NOTES
Physical Therapy Treatment    Patient Name: Sun Zimmerman  MRN: [id number]  Encounter date: 7/18/2025    Time Calculation  Start Time: 0815  Stop Time: 0855  Time Calculation (min): 40 min  PT Therapeutic Procedures Time Entry  Therapeutic Exercise Time Entry: 40    Visit # 2 of 12  Visits/Dates Authorized: 7/3  07/01/2025: NO AUTH, 1000 DED-MET, 100% COVERAGE, 20V PT, 4500 OOP-NOT MET, ANTHEM     Current Problem:   Problem List Items Addressed This Visit           ICD-10-CM    Left knee pain M25.562    Primary osteoarthritis of left knee M17.12    Sprain of left knee, initial encounter S83.92XA          Precautions:    none       Subjective   General:    Pt reports that she sprained her R ankle about 10 days ago. She was unable to walk, that is why she had to cx her appt. It is improved, but still sore. Her L knee is feeling pretty good right now.     Pre-Treatment Symptoms:        Objective   Vitals:             Treatments:      Therapeutic Exercise [id number]:   Recumbent bike  L 7 st #14  Tband side step orange x 3  Step up unable due to L knee pain  1 leg balance DNP   TKE blue x 20   Leg press DNP   Leg extension DNP   Heel raise Pain r ankle - modified to more WB on L  Tball flexion  Tball heel digs  QS and SLR        Assessment:    L knee progressing, but had difficulty with step ups for L knee,     Post-Treatment Symptoms:        Plan:    Progress LE as tolerated    Goals:   Active       PT Problem       PT Goal 1       Start:  07/07/25    Expected End:  10/05/25       1. Pt will be able to carry grand child up and down the stairs without increased shweta or difficulty  2. Pt will have 5/5 MMT B LES  3. Pt will have 0/0/130 degrees L knee PROM  4. Pt will improve WOMAC score to < 20% impairment.  5. Pt will be independent with HEP.

## 2025-07-23 ENCOUNTER — APPOINTMENT (OUTPATIENT)
Dept: RHEUMATOLOGY | Facility: CLINIC | Age: 60
End: 2025-07-23
Payer: COMMERCIAL

## 2025-08-04 DIAGNOSIS — M79.7 FIBROMYALGIA: ICD-10-CM

## 2025-08-04 RX ORDER — GABAPENTIN 600 MG/1
600 TABLET ORAL 2 TIMES DAILY
Qty: 180 TABLET | Refills: 0 | OUTPATIENT
Start: 2025-08-04

## 2025-09-02 ENCOUNTER — APPOINTMENT (OUTPATIENT)
Dept: RADIOLOGY | Facility: HOSPITAL | Age: 60
End: 2025-09-02
Payer: COMMERCIAL

## 2025-09-03 ENCOUNTER — APPOINTMENT (OUTPATIENT)
Dept: OTOLARYNGOLOGY | Facility: CLINIC | Age: 60
End: 2025-09-03
Payer: COMMERCIAL

## 2025-09-03 ENCOUNTER — APPOINTMENT (OUTPATIENT)
Dept: ORTHOPEDIC SURGERY | Facility: CLINIC | Age: 60
End: 2025-09-03
Payer: COMMERCIAL

## 2025-09-03 DIAGNOSIS — M25.562 LEFT KNEE PAIN, UNSPECIFIED CHRONICITY: ICD-10-CM

## 2025-09-03 DIAGNOSIS — S83.92XA SPRAIN OF LEFT KNEE, INITIAL ENCOUNTER: ICD-10-CM

## 2025-09-03 DIAGNOSIS — M17.12 ARTHRITIS OF LEFT KNEE: ICD-10-CM

## 2025-09-03 DIAGNOSIS — M17.12 PRIMARY OSTEOARTHRITIS OF LEFT KNEE: ICD-10-CM

## 2025-09-04 ENCOUNTER — APPOINTMENT (OUTPATIENT)
Dept: OBSTETRICS AND GYNECOLOGY | Facility: CLINIC | Age: 60
End: 2025-09-04
Payer: COMMERCIAL

## 2025-09-05 ENCOUNTER — APPOINTMENT (OUTPATIENT)
Dept: RADIOLOGY | Facility: HOSPITAL | Age: 60
End: 2025-09-05
Payer: COMMERCIAL

## 2025-09-12 ENCOUNTER — APPOINTMENT (OUTPATIENT)
Dept: RADIOLOGY | Facility: HOSPITAL | Age: 60
End: 2025-09-12
Payer: COMMERCIAL

## 2025-09-17 ENCOUNTER — APPOINTMENT (OUTPATIENT)
Dept: ORTHOPEDIC SURGERY | Facility: CLINIC | Age: 60
End: 2025-09-17
Payer: COMMERCIAL

## 2026-05-20 ENCOUNTER — APPOINTMENT (OUTPATIENT)
Dept: HEMATOLOGY/ONCOLOGY | Facility: HOSPITAL | Age: 61
End: 2026-05-20
Payer: COMMERCIAL

## (undated) DEVICE — SUTURE, NUROLON, 4-0, 18 IN, TF, CONTROL RELEASE, MULTIPACK, BLACK

## (undated) DEVICE — BANDAGE, GAUZE, CONFORMING, KERLIX, 6 PLY, 4.5 IN X 4.1 YD

## (undated) DEVICE — SYRINGE, 60 CC, IRRIGATION, BULB, CONTRO-BULB, PAPER POUCH

## (undated) DEVICE — PAD, DECUBITUS, 30 X 60 IN, POLYESTER, LF

## (undated) DEVICE — CATHETER TRAY, SURESTEP, 16FR, URINE METER W/STATLOCK

## (undated) DEVICE — TAPE, SILK, DURAPORE, 3 IN X 10 YD, LF

## (undated) DEVICE — MANIFOLD, 4 PORT NEPTUNE STANDARD

## (undated) DEVICE — DRAPE PACK, CRANIOTOMY, CUSTOM, UHC

## (undated) DEVICE — COVER, CART, 45 X 27 X 48 IN, CLEAR

## (undated) DEVICE — GOWN, SURGICAL, SMARTGOWN, XLARGE, STERILE

## (undated) DEVICE — PITCHER, GRADUATE, 32 OZ (1200CC), STERILE

## (undated) DEVICE — DRAPE, MICROSCOPE, FOR ZEISS 65MM, VARI-LENS II, 52 X 150

## (undated) DEVICE — APPLICATOR, PREP, CHLORAPREP, W/ORANGE TINT, 10.5ML

## (undated) DEVICE — REST, HEAD, BAGEL, 9 IN

## (undated) DEVICE — BAG, PLASTIC, 10 X 17 IN

## (undated) DEVICE — TUBING, SMOKE EVAC, 3/8 X 10 FT

## (undated) DEVICE — COVER, TABLE, UHC

## (undated) DEVICE — RETRACTOR, HOOK, FISH, NEURO

## (undated) DEVICE — SPONGE, HEMOSTATIC, GELATIN, SURGIFOAM, 8 X 12.5 CM X 10 MM

## (undated) DEVICE — SEALANT, HEMOSTATIC, FLOSEAL, 10 ML

## (undated) DEVICE — MARKER, SKIN, RULER AND LABEL PACK, CUSTOM

## (undated) DEVICE — BUR, STRAIGHT ROUTER

## (undated) DEVICE — Device

## (undated) DEVICE — PAD, GROUNDING, ELECTROSURGICAL, W/9 FT CABLE, POLYHESIVE II, ADULT, LF

## (undated) DEVICE — DRESSING, GAUZE, PETROLATUM, PATCH, XEROFORM, 1 X 8 IN, STERILE

## (undated) DEVICE — SPONGE, HEMOSTATIC, CELLULOSE, SURGICEL, 2 X 14 IN

## (undated) DEVICE — SPONGE, HEMOSTAT, SURGICEL FIBRILLAR, ABS, 4 X 4, LF

## (undated) DEVICE — CONTAINER, SPECIMEN, 120 ML, STERILE

## (undated) DEVICE — BUR, ROUTER BIT, FA2, TAPERED, 2.3MM

## (undated) DEVICE — SPHERE, STEALTHSTATION, 5-PK